# Patient Record
Sex: FEMALE | Race: BLACK OR AFRICAN AMERICAN | Employment: FULL TIME | URBAN - METROPOLITAN AREA
[De-identification: names, ages, dates, MRNs, and addresses within clinical notes are randomized per-mention and may not be internally consistent; named-entity substitution may affect disease eponyms.]

---

## 2017-01-13 ENCOUNTER — CLINICAL SUPPORT (OUTPATIENT)
Dept: SURGERY | Age: 52
End: 2017-01-13

## 2017-01-13 ENCOUNTER — HOSPITAL ENCOUNTER (OUTPATIENT)
Dept: LAB | Age: 52
Discharge: HOME | End: 2017-01-13
Payer: COMMERCIAL

## 2017-01-13 VITALS
HEART RATE: 72 BPM | RESPIRATION RATE: 18 BRPM | BODY MASS INDEX: 44.55 KG/M2 | SYSTOLIC BLOOD PRESSURE: 124 MMHG | TEMPERATURE: 98.3 F | HEIGHT: 59 IN | WEIGHT: 221 LBS | DIASTOLIC BLOOD PRESSURE: 70 MMHG

## 2017-01-13 DIAGNOSIS — Z12.11 ENCOUNTER FOR SCREENING COLONOSCOPY: Primary | ICD-10-CM

## 2017-01-13 DIAGNOSIS — E03.9 HYPOTHYROIDISM, UNSPECIFIED TYPE: ICD-10-CM

## 2017-01-13 DIAGNOSIS — E78.1 HYPERTRIGLYCERIDEMIA: ICD-10-CM

## 2017-01-13 DIAGNOSIS — Z01.419 WELL WOMAN EXAM WITH ROUTINE GYNECOLOGICAL EXAM: ICD-10-CM

## 2017-01-13 LAB
ALBUMIN SERPL BCP-MCNC: 3.2 G/DL (ref 3.4–5)
ALBUMIN/GLOB SERPL: 0.7 {RATIO} (ref 0.8–1.7)
ALP SERPL-CCNC: 91 U/L (ref 45–117)
ALT SERPL-CCNC: 24 U/L (ref 13–56)
ANION GAP BLD CALC-SCNC: 10 MMOL/L (ref 3–18)
AST SERPL W P-5'-P-CCNC: 9 U/L (ref 15–37)
BILIRUB SERPL-MCNC: 0.5 MG/DL (ref 0.2–1)
BUN SERPL-MCNC: 9 MG/DL (ref 7–18)
BUN/CREAT SERPL: 12 (ref 12–20)
CALCIUM SERPL-MCNC: 8.5 MG/DL (ref 8.5–10.1)
CHLORIDE SERPL-SCNC: 101 MMOL/L (ref 100–108)
CHOLEST SERPL-MCNC: 201 MG/DL
CO2 SERPL-SCNC: 29 MMOL/L (ref 21–32)
CREAT SERPL-MCNC: 0.75 MG/DL (ref 0.6–1.3)
GLOBULIN SER CALC-MCNC: 4.5 G/DL (ref 2–4)
GLUCOSE SERPL-MCNC: 94 MG/DL (ref 74–99)
HDLC SERPL-MCNC: 37 MG/DL (ref 40–60)
HDLC SERPL: 5.4 {RATIO} (ref 0–5)
LDLC SERPL CALC-MCNC: 143.2 MG/DL (ref 0–100)
LIPID PROFILE,FLP: ABNORMAL
POTASSIUM SERPL-SCNC: 4.1 MMOL/L (ref 3.5–5.5)
PROT SERPL-MCNC: 7.7 G/DL (ref 6.4–8.2)
SODIUM SERPL-SCNC: 140 MMOL/L (ref 136–145)
TRIGL SERPL-MCNC: 104 MG/DL (ref ?–150)
TSH SERPL DL<=0.05 MIU/L-ACNC: 1.81 UIU/ML (ref 0.36–3.74)
VLDLC SERPL CALC-MCNC: 20.8 MG/DL

## 2017-01-13 PROCEDURE — 84443 ASSAY THYROID STIM HORMONE: CPT | Performed by: PHYSICIAN ASSISTANT

## 2017-01-13 PROCEDURE — 80061 LIPID PANEL: CPT | Performed by: PHYSICIAN ASSISTANT

## 2017-01-13 PROCEDURE — 36415 COLL VENOUS BLD VENIPUNCTURE: CPT | Performed by: PHYSICIAN ASSISTANT

## 2017-01-13 PROCEDURE — 80053 COMPREHEN METABOLIC PANEL: CPT | Performed by: PHYSICIAN ASSISTANT

## 2017-01-13 NOTE — PROGRESS NOTES
Review of Systems   Constitutional: Negative. HENT: Negative. Eyes: Positive for blurred vision. Negative for double vision, photophobia, pain, discharge and redness. Respiratory: Positive for wheezing. Negative for cough, hemoptysis, sputum production and shortness of breath. Cardiovascular: Negative. Gastrointestinal: Negative. Genitourinary: Negative. Musculoskeletal: Positive for back pain. Negative for falls, joint pain, myalgias and neck pain. Skin: Negative. Neurological: Negative. Endo/Heme/Allergies: Negative. Psychiatric/Behavioral: Negative. Colon Screen    Patient: Shashi Bowers MRN: [id number]  SSN: [id number]    YOB: 1965  Age: 46 y.o. Sex: female        Subjective:   Meg Guillory was referred by her  PCP is Eve Adair. Patient referred for colonoscopy for   Screening colonoscopy. Patient denies rectal pain or bleeding. Abdominal surgeries as described below, specifically none  Family history as described below, specifically none. No Known Allergies  Pt never had cn. Past Medical History   Diagnosis Date    Acute shoulder pain due to trauma 5/17/2016    Allergic rhinitis 11/9/2009    Asthma 2015     PFT confirmed    CAD (coronary artery disease)      hx mi    Carpal tunnel syndrome 11/9/2009    Chronic low back pain without sciatica 5/17/2016    Excessive use of nonsteroidal anti-inflammatory drug (NSAID) 5/17/2016    Fibroid, uterine 11/9/2009    GERD (gastroesophageal reflux disease) 11/9/2009    HLD (hyperlipidemia) 11/9/2009    Hypertension     Hypertriglyceridemia 5/17/2016    Hypothyroidism 5/17/2016    Knee pain, right     SOB (shortness of breath)     Weight gain 5/17/2016     Past Surgical History   Procedure Laterality Date    Hx myomectomy       open first, then embolization 2nd time.     Hx tonsillectomy      Hx breast biopsy       benign, left    Hx gyn       fibroids removed      Family History   Problem Relation Age of Onset    Stroke Father      mini strokes, not sure of number    Hypertension Father     Cancer Maternal Grandfather      not sure type     Social History   Substance Use Topics    Smoking status: Never Smoker    Smokeless tobacco: Never Used    Alcohol use No      Prior to Admission medications    Medication Sig Start Date End Date Taking? Authorizing Provider   desloratadine (CLARINEX) 5 mg tablet Take 1 Tab by mouth daily. 10/24/16  Yes NIMA Guadarrama   lisinopril-hydrochlorothiazide (PRINZIDE, ZESTORETIC) 10-12.5 mg per tablet Take 1 Tab by mouth daily. 10/24/16  Yes NIMA Guadarrama   OTHER Indications: fish oil   Yes Barrington Wooten, MD   mometasone (NASONEX) 50 mcg/actuation nasal spray 2 Sprays by Both Nostrils route daily. 4/15/16  Yes Katty Carranza PA-C   fluticasone-salmeterol (ADVAIR) 100-50 mcg/dose diskus inhaler Take 1 Puff by inhalation every twelve (12) hours. 12/8/15  Yes Frankie Kemp NP   celecoxib (CELEBREX) 200 mg capsule Take 1 capsule twice a day with food. 4/21/15  Yes Frankie Kemp NP   aspirin 81 mg chewable tablet Take 1 Tab by mouth daily. 4/21/15  Yes Frankie Kemp NP   albuterol (PROVENTIL HFA, VENTOLIN HFA, PROAIR HFA) 90 mcg/actuation inhaler Take 2 Puffs by inhalation every four (4) hours as needed for Wheezing. 4/21/15  Yes Frankie Kemp NP   predniSONE (DELTASONE) 20 mg tablet Take 1 Tab by mouth daily (with breakfast). 12/12/16   NIMA Guadarrama   ibuprofen (MOTRIN) 600 mg tablet Take 1 Tab by mouth every six (6) hours as needed for Pain. 5/20/16   Gladystdanyel Cabral MD   omega-3 acid ethyl esters (LOVAZA) 1 gram capsule Take 2 Caps by mouth two (2) times daily (with meals). Indications: HYPERTRIGLYCERIDEMIA 5/17/16   Frankie Kemp NP   levothyroxine (SYNTHROID) 25 mcg tablet Take 1 Tab by mouth Daily (before breakfast).  Indications: HYPOTHYROIDISM 5/17/16   Frankie Kemp NP          Review of Systems:        Risks colonoscopy described- colon injury, missed lesion, anesthesia problems, bleeding       Shelley Moctezuma RN  January 13, 2017  11:43 AM

## 2017-01-13 NOTE — MR AVS SNAPSHOT
Visit Information Date & Time Provider Department Dept. Phone Encounter #  
 1/13/2017 11:00 AM [facility] NURSE VISIT [place] [phone] [id number] Your Appointments 1/16/2017  4:00 PM  
FOLLOW UP EXAM with NIMA Ann ([facility]) Appt Note: 8 wk f/u  
 [address] [place] [address]  
[facility] [address] Upcoming Health Maintenance Date Due COLONOSCOPY 4/2/1983 Pneumococcal 19-64 Medium Risk (1 of 1 - PPSV23) 4/2/1984 DTaP/Tdap/Td series (1 - Tdap) 4/2/1986 BREAST CANCER SCRN MAMMOGRAM 12/11/2016 PAP AKA CERVICAL CYTOLOGY 11/21/2019 Allergies as of 1/13/2017  Review Complete On: 1/13/2017 By: Elyssa Pond RN No Known Allergies Current Immunizations  Never Reviewed No immunizations on file. Not reviewed this visit Vitals BP Pulse Temp Resp Height(growth percentile) Weight(growth percentile) 124/70 72 98.3 °F (36.8 °C) 18 4' 11\" (1.499 m) 221 lb (100.2 kg) LMP BMI OB Status Smoking Status 03/31/2015 44.64 kg/m2 Postmenopausal Never Smoker BMI and BSA Data Body Mass Index Body Surface Area  
 44.64 kg/m 2 2.04 m 2 Preferred Pharmacy Pharmacy Name Phone [id number] - [address] [phone] Your Updated Medication List  
  
   
This list is accurate as of: 1/13/17 11:46 AM.  Always use your most recent med list.  
  
  
  
  
 albuterol 90 mcg/actuation inhaler Commonly known as:  PROVENTIL HFA, VENTOLIN HFA, PROAIR HFA Take 2 Puffs by inhalation every four (4) hours as needed for Wheezing. aspirin 81 mg chewable tablet Take 1 Tab by mouth daily. celecoxib 200 mg capsule Commonly known as:  CELEBREX Take 1 capsule twice a day with food. desloratadine 5 mg tablet Commonly known as:  CLARINEX Take 1 Tab by mouth daily. fluticasone-salmeterol 100-50 mcg/dose diskus inhaler Commonly known as:  ADVAIR Take 1 Puff by inhalation every twelve (12) hours. ibuprofen 600 mg tablet Commonly known as:  MOTRIN Take 1 Tab by mouth every six (6) hours as needed for Pain.  
  
 levothyroxine 25 mcg tablet Commonly known as:  SYNTHROID Take 1 Tab by mouth Daily (before breakfast). Indications: HYPOTHYROIDISM  
  
 lisinopril-hydroCHLOROthiazide 10-12.5 mg per tablet Commonly known as:  Mehran Boxer Take 1 Tab by mouth daily. mometasone 50 mcg/actuation nasal spray Commonly known as:  NASONEX  
2 Sprays by Both Nostrils route daily. omega-3 acid ethyl esters 1 gram capsule Commonly known as:  Leanor Cedric Take 2 Caps by mouth two (2) times daily (with meals). Indications: HYPERTRIGLYCERIDEMIA  
  
 OTHER Indications: fish oil  
  
 predniSONE 20 mg tablet Commonly known as:  Abete Corner Take 1 Tab by mouth daily (with breakfast). To-Do List   
 01/18/2017 4:30 PM  
  Appointment with CHUYITA GERMAIN  1 at [address] [place] [address] ([phone]) OUTSIDE FILMS  - Any outside films related to the study being scheduled should be brought with you on the day of the exam.  If this cannot be done there may be a delay in the reading of the study. MEDICATIONS  - Patient must bring a complete list of all medications currently taking to include prescriptions, over-the-counter meds, herbals, vitamins & any dietary supplements  GENERAL INSTRUCTIONS  - On the day of your exam do not use any bath powder, deodorant or lotions on the armpit area. -Tenderness of breasts may cause an increase of discomfort during procedure. If you are experiencing breast tenderness on the day of your appointment and would like to reschedule, please call [phone]. Patient Instructions MIRALAX PREP FOR PROCEDURE  
 
 Your Procedure is scheduled on        plan to arrive at [facility] 2nd Floor @               am/pm. Please be advised that your arrival time is subject to change. You will receive a confirmation call the day before your procedure to confirm your EXACT arrival time .~ Please do not arrive any earlier than time given as this may prolong your wait time for your procedure ~  
**Effective immediately, all patients will need to have a ride to and from [facility], and their ride must stay while they are in the procedure** YOU MUST STOP ALL BLOOD THINNING MEDICATIONS according to the doctor that prescribed them for you. Examples: Aspirin, Coumadin, Plavix, Xarelto, Pradaxa, Effient and Eliquis. See list for how many days in advance you must stop these medications. You must also stop any NSAIDS such as, Ibuprofen, Mobic, Advil, Aleve and Naprosyn for 5 days. You must also stop any platelet aggregation inhibitors such as Aggrenox. If you are on any WILKS-2 inhibitors such as Celebrex you must stop for 3 days. Purchase 1 bottle of MIRALAX (238 gram bottle), generic OK Purchase DULCOLAX Laxative 5 mg tablets, NOT stool softener. Purchase two 32 oz. bottles of GATORADE. (No red or purple) Diabetics use 64 oz. of water, ok to flavor with Crystal Light. You also may 
     choose to purchase two 32 oz bottles of Powerade Zero (No red or purple) 5 days before your bowel prep eat a low fiber diet (minimize fruits, vegetables and whole grains) and stop any fiber supplements. The Day before your Procedure:  Beginning at breakfast start a clear liquid diet. This consists of clear broth, coffee or tea without milk, apple and white-grape juice. You may also have Jell-O, Popsicle and clear soft drinks. NO MILK PRODUCTS AND NOTHING RED IN COLOR. 1.  At 1 pm take four 5 mg Dulcolax tablets. 2.  At 4 pm mix half of the Lorenzo Boer into 32 oz of your selected fluid (Gatorade, water/Crystal Light or Powerade Zero). Drink 8 oz every 15-20 minutes until finished. 3. At 8 pm mix the second half of MIRALAX into the other 32 oz of your selected fluid (Gatorade, water/Crystal Light or Powerade Zero). Drink 8 oz every 15-20 minutes until finished. 4.  If severe nausea occurs while drinking the prep, rest for 20 minutes then restart. HAVE NOTHING BY MOUTH AFTER MIDNIGHT The Day of Your Procedure: YOU MAY SHOWER AND BRUSH YOUR TEETH. YOU MAY NOT HAVE HARD CANDY OR CHEWING GUM. DO NOT TAKE ORAL DIABETIC, INSULIN, & DIURECTIC (FLUID) MEDS THE MORNING OF YOUR PROCEDURE. YOU MAY TAKE ALL OTHER MEDS WITH A SMALL SIP OF WATER. If you have been instructed to stop a blood thinner such as Aspirin, Plavix or Coumadin, etc. and have not been approved by your primary care doctor to do so, please contact our office. Permission to stop aspirin 7 days prior to procedure. Contact Hmou-[phone]   or Ed Valles [phone] Introducing [facility]! Ofe Micah introduces [employer] patient portal. Now you can access parts of your medical record, email your doctor's office, and request medication refills online. 1. In your internet browser, go to [email or web address] 2. Click on the First Time User? Click Here link in the Sign In box. You will see the New Member Sign Up page. 3. Enter your [employer] Access Code exactly as it appears below. You will not need to use this code after youve completed the sign-up process. If you do not sign up before the expiration date, you must request a new code. · [employer] Access Code: [email or web address] Expires: 1/22/2017  2:52 PM 
 
4. Enter the last four digits of your Social Security Number (xxxx) and Date of Birth (mm/dd/yyyy) as indicated and click Submit. You will be taken to the next sign-up page. 5. Create a Look[employer] ID. This will be your [employer] login ID and cannot be changed, so think of one that is secure and easy to remember. 6. Create a [employer] password. You can change your password at any time. 7. Enter your Password Reset Question and Answer. This can be used at a later time if you forget your password. 8. Enter your e-mail address. You will receive e-mail notification when new information is available in [address] 9. Click Sign Up. You can now view and download portions of your medical record. 10. Click the Download Summary menu link to download a portable copy of your medical information. If you have questions, please visit the Frequently Asked Questions section of the [employer] website. Remember, [employer] is NOT to be used for urgent needs. For medical emergencies, dial 911. Now available from your iPhone and Android! Please provide this summary of care documentation to your next provider. Your primary care clinician is listed as Elyssa Olivares. If you have any questions after today's visit, please call [phone].

## 2017-01-13 NOTE — PATIENT INSTRUCTIONS
MIRALAX PREP FOR PROCEDURE     Your Procedure is scheduled on        plan to arrive at [facility] 2nd Floor @               am/pm. Please be advised that your arrival time is subject to change. You will receive a confirmation call the day before your procedure to confirm your EXACT arrival time  .~ Please do not arrive any earlier than time given as this may prolong your wait time for your procedure ~   **Effective immediately, all patients will need to have a ride to and from [facility], and their ride must stay while they are in the procedure**    YOU MUST STOP ALL BLOOD THINNING MEDICATIONS according to the doctor that prescribed them for you. Examples: Aspirin, Coumadin, Plavix, Xarelto, Pradaxa, Effient and Eliquis. See list for how many days in advance you must stop these medications. You must also stop any NSAIDS such as, Ibuprofen, Mobic, Advil, Aleve and Naprosyn for 5 days. You must also stop any platelet aggregation inhibitors such as Aggrenox. If you are on any WILKS-2 inhibitors such as Celebrex you must stop for 3 days. Purchase 1 bottle of MIRALAX (238 gram bottle), generic OK               Purchase DULCOLAX Laxative 5 mg tablets, NOT stool softener. Purchase two 32 oz. bottles of GATORADE. (No red or purple)       Diabetics use 64 oz. of water, ok to flavor with Crystal Light. You also may       choose to purchase two 32 oz bottles of Powerade Zero (No red or purple)            5 days before your bowel prep eat a low fiber diet (minimize fruits, vegetables and whole grains) and stop any fiber supplements. The Day before your Procedure:      Beginning at breakfast start a clear liquid diet. This consists of clear broth, coffee or tea without milk, apple and white-grape juice. You may also have Jell-O, Popsicle and clear soft drinks. NO MILK PRODUCTS AND NOTHING RED IN COLOR. 1.  At 1 pm take four 5 mg Dulcolax tablets.     2.  At 4 pm mix half of the Henrey Brooklyn into 32 oz of your selected fluid (Gatorade, water/Crystal Light or Powerade Zero). Drink 8 oz every 15-20 minutes until finished. 3. At 8 pm mix the second half of MIRALAX into the other 32 oz of your selected fluid (Gatorade, water/Crystal Light or Powerade Zero). Drink 8 oz every 15-20 minutes until finished. 4.  If severe nausea occurs while drinking the prep, rest for 20 minutes then restart. HAVE NOTHING BY MOUTH AFTER MIDNIGHT                 The Day of Your Procedure:      YOU MAY SHOWER AND BRUSH YOUR TEETH. YOU MAY NOT HAVE HARD CANDY OR CHEWING GUM. DO NOT TAKE ORAL DIABETIC, INSULIN, & DIURECTIC (FLUID) MEDS THE MORNING OF YOUR PROCEDURE. YOU MAY TAKE ALL OTHER MEDS WITH A SMALL SIP OF WATER. If you have been instructed to stop a blood thinner such as Aspirin, Plavix or Coumadin, etc. and have not been approved by your primary care doctor to do so, please contact our office. Permission to stop aspirin 7 days prior to procedure.                   Contact Iygy-[phone] -  or Gabriel Montes [phone]

## 2017-01-18 ENCOUNTER — HOSPITAL ENCOUNTER (OUTPATIENT)
Dept: MAMMOGRAPHY | Age: 52
Discharge: HOME | End: 2017-01-18
Attending: PHYSICIAN ASSISTANT
Payer: COMMERCIAL

## 2017-01-18 DIAGNOSIS — Z01.419 WELL WOMAN EXAM WITH ROUTINE GYNECOLOGICAL EXAM: ICD-10-CM

## 2017-01-18 DIAGNOSIS — Z12.39 SCREENING FOR BREAST CANCER: ICD-10-CM

## 2017-01-18 PROCEDURE — 77067 SCR MAMMO BI INCL CAD: CPT

## 2017-01-23 ENCOUNTER — OFFICE VISIT (OUTPATIENT)
Dept: FAMILY MEDICINE CLINIC | Age: 52
End: 2017-01-23

## 2017-01-23 VITALS
RESPIRATION RATE: 18 BRPM | OXYGEN SATURATION: 99 % | BODY MASS INDEX: 45.16 KG/M2 | HEIGHT: 59 IN | SYSTOLIC BLOOD PRESSURE: 150 MMHG | HEART RATE: 78 BPM | DIASTOLIC BLOOD PRESSURE: 83 MMHG | TEMPERATURE: 98.6 F | WEIGHT: 224 LBS

## 2017-01-23 DIAGNOSIS — I10 ESSENTIAL HYPERTENSION: Primary | ICD-10-CM

## 2017-01-23 DIAGNOSIS — E78.5 HYPERLIPIDEMIA, UNSPECIFIED HYPERLIPIDEMIA TYPE: ICD-10-CM

## 2017-01-23 DIAGNOSIS — J45.30 MILD PERSISTENT ASTHMA WITHOUT COMPLICATION: ICD-10-CM

## 2017-01-23 RX ORDER — ROSUVASTATIN CALCIUM 5 MG/1
TABLET, COATED ORAL
COMMUNITY
End: 2018-08-16

## 2017-01-23 NOTE — PROGRESS NOTES
Patient: Johnathan Velázquez MRN: [id number]  SSN: [id number]    YOB: 1965  Age: 46 y.o. Sex: female      Date of Service: 1/23/2017   Provider: NIMA Julian         REASON FOR VISIT:   Chief Complaint   Patient presents with    Follow-up     pt states she need to get results from [facility] and lab work        VITALS:   Visit Vitals    /83 (BP 1 Location: Right arm, BP Patient Position: Sitting)    Pulse 78    Temp 98.6 °F (37 °C) (Oral)    Resp 18    Ht 4' 11\" (1.499 m)    Wt 224 lb (101.6 kg)    LMP 03/31/2015    SpO2 99%    BMI 45.24 kg/m2       MEDICATIONS:   Current Outpatient Prescriptions on File Prior to Visit   Medication Sig Dispense Refill    predniSONE (DELTASONE) 20 mg tablet Take 1 Tab by mouth daily (with breakfast). 5 Tab 0    desloratadine (CLARINEX) 5 mg tablet Take 1 Tab by mouth daily. 90 Tab 3    lisinopril-hydrochlorothiazide (PRINZIDE, ZESTORETIC) 10-12.5 mg per tablet Take 1 Tab by mouth daily. 30 Tab 2    ibuprofen (MOTRIN) 600 mg tablet Take 1 Tab by mouth every six (6) hours as needed for Pain. 20 Tab 0    OTHER Indications: fish oil      omega-3 acid ethyl esters (LOVAZA) 1 gram capsule Take 2 Caps by mouth two (2) times daily (with meals). Indications: HYPERTRIGLYCERIDEMIA 360 Cap 3    mometasone (NASONEX) 50 mcg/actuation nasal spray 2 Sprays by Both Nostrils route daily. 1 Container 0    fluticasone-salmeterol (ADVAIR) 100-50 mcg/dose diskus inhaler Take 1 Puff by inhalation every twelve (12) hours. 3 Inhaler 3    aspirin 81 mg chewable tablet Take 1 Tab by mouth daily. 30 Tab 11    albuterol (PROVENTIL HFA, VENTOLIN HFA, PROAIR HFA) 90 mcg/actuation inhaler Take 2 Puffs by inhalation every four (4) hours as needed for Wheezing. 3 Inhaler 3    celecoxib (CELEBREX) 200 mg capsule Take 1 capsule twice a day with food. 60 Cap 3     No current facility-administered medications on file prior to visit.          ALLERGIES:   No Known Allergies ACTIVE MEDICAL PROBLEMS:  Patient Active Problem List   Diagnosis Code    Allergic rhinitis J30.9    [facility] 40.0[facility]44.9[facility] Z68.41    Hypertension I10    Elevated TSH R94.6    Asthma J45.909    Chronic low back pain without sciatica M54.5, G89.29    Excessive use of nonsteroidal anti-inflammatory drug (NSAID) F19.90    Hypertriglyceridemia E78.1    Weight gain R63.5    Hypothyroidism E03.9    Compliance with medication regimen Z91.89        MEDICAL/SURGICAL HISTORY:  Past Medical History   Diagnosis Date    Acute shoulder pain due to trauma 5/17/2016    Allergic rhinitis 11/9/2009    Asthma 2015     PFT confirmed    CAD (coronary artery disease)      hx mi    Carpal tunnel syndrome 11/9/2009    Chronic low back pain without sciatica 5/17/2016    Excessive use of nonsteroidal anti-inflammatory drug (NSAID) 5/17/2016    Fibroid, uterine 11/9/2009    GERD (gastroesophageal reflux disease) 11/9/2009    HLD (hyperlipidemia) 11/9/2009    Hypertension     Hypertriglyceridemia 5/17/2016    Hypothyroidism 5/17/2016    Knee pain, right     SOB (shortness of breath)     Weight gain 5/17/2016      Past Surgical History   Procedure Laterality Date    Hx myomectomy       open first, then embolization 2nd time.  Hx tonsillectomy      Hx breast biopsy       benign, left    Hx gyn       fibroids removed        FAMILY HISTORY:  Family History   Problem Relation Age of Onset    Stroke Father      mini strokes, not sure of number    Hypertension Father     Cancer Maternal Grandfather      not sure type        SOCIAL HISTORY:  Social History   Substance Use Topics    Smoking status: Never Smoker    Smokeless tobacco: Never Used    Alcohol use No             HISTORY OF PRESENT ILLNESS: Armin Zheng is a 46 y.o. female who presents to the office for a routine follow up visit.      Hypertension -  HTN had been previously very well controlled, though BP noted to be a bit elevated today. Patient admits she has forgotten to take her medications for the past 2 days, as things have been very busy at work. She stopped checking her blood pressure at home, as readings had been stable for several months. BP Readings from Last 3 Encounters:   01/23/17 150/83   01/13/17 124/70   12/12/16 138/68        Hypothyroidism -   Patient stopped synthroid several months ago  Recent TSH was within normal limits  Continues to complain of difficulty losing weight, but suspects this may be due to lack of exercise     Lab Results   Component Value Date/Time    TSH 1.81 01/13/2017 10:09 AM        Hyperlipidemia/CAD -   Recent lipid panel shows decreased HDL, increased LDL, however triglycerides have improved substantially with diet. Patient has been off statins for several months now, as she was concerned about potential side effects. Lab Results   Component Value Date/Time    Cholesterol, total 201 01/13/2017 10:09 AM    HDL Cholesterol 37 01/13/2017 10:09 AM    LDL, calculated 143.2 01/13/2017 10:09 AM    VLDL, calculated 20.8 01/13/2017 10:09 AM    Triglyceride 104 01/13/2017 10:09 AM    CHOL/HDL Ratio 5.4 01/13/2017 10:09 AM        Asthma -   Patient reports symptoms are stable. Compliant with Advair  Uses albuterol twice daily, once in the morning and once in the evening. Didn't realize this was supposed to be a strictly as needed medication. No nighttime awakenings with symptoms     REVIEW OF SYSTEMS:  Review of Systems   Constitutional: Negative for chills and fever. Eyes: Negative for blurred vision and double vision. Respiratory: Negative for shortness of breath and wheezing. Cardiovascular: Negative for chest pain, palpitations and leg swelling. Gastrointestinal: Negative for abdominal pain, nausea and vomiting. Neurological: Negative for dizziness and headaches. PHYSICAL EXAMINATION:  Physical Exam   Constitutional: She is well-developed, well-nourished, and in no distress. Cardiovascular: Normal rate, regular rhythm and normal heart sounds. Exam reveals no gallop and no friction rub. No murmur heard. Pulmonary/Chest: Effort normal and breath sounds normal. She has no wheezes. She has no rales. Skin: Skin is warm and dry. RESULTS:  No results found for this visit on 01/23/17. ASSESSMENT/PLAN:  Leonardo Bumpers was seen today for follow-up. Diagnoses and all orders for this visit:    Essential hypertension  - BP a bit elevated today, but suspect this is likely secondary to patient forgetting her medications over the past two days  - No change in medication dosage today  - Reviewed strategies to improve med compliance  - Advised resuming home monitoring, call if persistently > 208 systolic or > 90 diastolic    Hyperlipidemia, unspecified hyperlipidemia type  - Mild, but given history of CAD will resume statin therapy with Crestor 5 mg qhs   - Repeat lipid panel in 3 months     Mild persistent asthma without complication  - Stable, continue current regimen for now  - Did review proper use of albuterol inhaler, and if patient finds that she is needing this due to symptoms of cough/wheezing on a daily basis, should consider increasing dose of advair       Follow up 3 months  Sooner as needed    Patient expresses understanding and is agreeable with the above plan.         Vallerie Schilder, PA   January 23, 2017    6:05 PM

## 2017-01-23 NOTE — MR AVS SNAPSHOT
Visit Information Date & Time Provider Department Dept. Phone Encounter #  
 1/23/2017  5:00 PM Bronson Stratton [phone] [id number] Your Appointments 4/24/2017  4:30 PM  
FOLLOW UP EXAM with NIMA Stratton ([facility]) Appt Note: 3 month f/u  
 [address] [facility] [address]  
[facility] [address] Upcoming Health Maintenance Date Due COLONOSCOPY 4/2/1983 Pneumococcal 19-64 Medium Risk (1 of 1 - PPSV23) 4/2/1984 DTaP/Tdap/Td series (1 - Tdap) 4/2/1986 BREAST CANCER SCRN MAMMOGRAM 1/18/2018 PAP AKA CERVICAL CYTOLOGY 1/13/2020 Allergies as of 1/23/2017  Review Complete On: 1/23/2017 By: Analia Anguiano LPN No Known Allergies Current Immunizations  Never Reviewed No immunizations on file. Not reviewed this visit You Were Diagnosed With   
  
 Codes Comments Essential hypertension    -  Primary ICD-10-CM: I10 
ICD-9-CM: 401.9 Hyperlipidemia, unspecified hyperlipidemia type     ICD-10-CM: E78.5 ICD-9-CM: 272.4 Vitals BP Pulse Temp Resp Height(growth percentile) Weight(growth percentile) 150/83 (BP 1 Location: Right arm, BP Patient Position: Sitting) 78 98.6 °F (37 °C) (Oral) 18 4' 11\" (1.499 m) 224 lb (101.6 kg) LMP SpO2 BMI OB Status Smoking Status 03/31/2015 99% 45.24 kg/m2 Postmenopausal Never Smoker Vitals History BMI and BSA Data Body Mass Index Body Surface Area  
 45.24 kg/m 2 2.06 m 2 Preferred Pharmacy Pharmacy Name Phone [phone] - [phone] Your Updated Medication List  
  
   
This list is accurate as of: 1/23/17  5:43 PM.  Always use your most recent med list.  
  
  
  
  
 albuterol 90 mcg/actuation inhaler Commonly known as:  PROVENTIL HFA, VENTOLIN HFA, PROAIR HFA Take 2 Puffs by inhalation every four (4) hours as needed for Wheezing. aspirin 81 mg chewable tablet Take 1 Tab by mouth daily. celecoxib 200 mg capsule Commonly known as:  CELEBREX Take 1 capsule twice a day with food. CRESTOR 5 mg tablet Generic drug:  rosuvastatin Take  by mouth nightly. desloratadine 5 mg tablet Commonly known as:  CLARINEX Take 1 Tab by mouth daily. fluticasone-salmeterol 100-50 mcg/dose diskus inhaler Commonly known as:  ADVAIR Take 1 Puff by inhalation every twelve (12) hours. lisinopril-hydroCHLOROthiazide 10-12.5 mg per tablet Commonly known as:  Spencer Wolf Take 1 Tab by mouth daily. mometasone 50 mcg/actuation nasal spray Commonly known as:  NASONEX  
2 Sprays by Both Nostrils route daily. omega-3 acid ethyl esters 1 gram capsule Commonly known as:  Anthonette Rolling Take 2 Caps by mouth two (2) times daily (with meals). Indications: HYPERTRIGLYCERIDEMIA  
  
 OTHER Indications: fish oil Introducing [facility]! [employer] introduces [employer] patient portal. Now you can access parts of your medical record, email your doctor's office, and request medication refills online. 1. In your internet browser, go to [email or web address] 2. Click on the First Time User? Click Here link in the Sign In box. You will see the New Member Sign Up page. 3. Enter your [employer] Access Code exactly as it appears below. You will not need to use this code after youve completed the sign-up process. If you do not sign up before the expiration date, you must request a new code. · [employer] Access Code: [email or web address] Expires: 4/23/2017  5:43 PM 
 
4. Enter the last four digits of your Social Security Number (xxxx) and Date of Birth (mm/dd/yyyy) as indicated and click Submit. You will be taken to the next sign-up page. 5. Create a Stylyt ID. This will be your Stylyt login ID and cannot be changed, so think of one that is secure and easy to remember. 6. Create a Stylyt password. You can change your password at any time. 7. Enter your Password Reset Question and Answer. This can be used at a later time if you forget your password. 8. Enter your e-mail address. You will receive e-mail notification when new information is available in [address] 9. Click Sign Up. You can now view and download portions of your medical record. 10. Click the Download Summary menu link to download a portable copy of your medical information. If you have questions, please visit the Frequently Asked Questions section of the Stylyt website. Remember, Stylyt is NOT to be used for urgent needs. For medical emergencies, dial 911. Now available from your iPhone and Android! Please provide this summary of care documentation to your next provider. Your primary care clinician is listed as Mayra Sloan. If you have any questions after today's visit, please call [phone].

## 2017-01-23 NOTE — PROGRESS NOTES
Chief Complaint   Patient presents with    Follow-up     pt states she need to get results from [facility] and lab work

## 2017-04-30 VITALS
OXYGEN SATURATION: 100 % | HEART RATE: 80 BPM | TEMPERATURE: 98.6 F | BODY MASS INDEX: 44.43 KG/M2 | DIASTOLIC BLOOD PRESSURE: 77 MMHG | SYSTOLIC BLOOD PRESSURE: 134 MMHG | WEIGHT: 220 LBS | RESPIRATION RATE: 18 BRPM

## 2017-04-30 PROCEDURE — 99282 EMERGENCY DEPT VISIT SF MDM: CPT

## 2017-05-01 ENCOUNTER — HOSPITAL ENCOUNTER (EMERGENCY)
Age: 52
Discharge: HOME | End: 2017-05-01
Attending: EMERGENCY MEDICINE
Payer: COMMERCIAL

## 2017-05-01 DIAGNOSIS — M54.32 SCIATICA OF LEFT SIDE: Primary | ICD-10-CM

## 2017-05-01 RX ORDER — PREDNISONE 50 MG/1
50 TABLET ORAL DAILY
Qty: 5 TAB | Refills: 0 | Status: SHIPPED | OUTPATIENT
Start: 2017-05-01 | End: 2017-05-06

## 2017-05-01 RX ORDER — HYDROCODONE BITARTRATE AND ACETAMINOPHEN 5; 325 MG/1; MG/1
TABLET ORAL
Qty: 6 TAB | Refills: 0 | Status: SHIPPED | OUTPATIENT
Start: 2017-05-01 | End: 2017-09-12 | Stop reason: ALTCHOICE

## 2017-05-01 RX ORDER — NAPROXEN 500 MG/1
500 TABLET ORAL 2 TIMES DAILY WITH MEALS
Qty: 20 TAB | Refills: 0 | Status: SHIPPED | OUTPATIENT
Start: 2017-05-01 | End: 2017-05-11

## 2017-05-01 NOTE — ED PROVIDER NOTES
HPI Comments: 12:37 AM Loretta Story is a 46 y.o. female with a hx of HTN who presents to the ED via POV c/o lower back pain radiating down back of left leg  that began 4/29/17. Pt states pain is worsened with ambulation. Pt says pain is currently most severe in left hamstring area. She has not taken any medication for her pain. The Pt denies trauma, fever, hx of IV drug use, numbness, urinary problems, or any further sxs, complaints or concerns at this time. Patient is a 46 y.o. female presenting with leg pain. The history is provided by the patient. Leg Pain    Associated symptoms include back pain (lower back). Pertinent negatives include no numbness. Past Medical History:   Diagnosis Date    Acute shoulder pain due to trauma 5/17/2016    Allergic rhinitis 11/9/2009    Asthma 2015    PFT confirmed    CAD (coronary artery disease)     hx mi    Carpal tunnel syndrome 11/9/2009    Chronic low back pain without sciatica 5/17/2016    Excessive use of nonsteroidal anti-inflammatory drug (NSAID) 5/17/2016    Fibroid, uterine 11/9/2009    GERD (gastroesophageal reflux disease) 11/9/2009    HLD (hyperlipidemia) 11/9/2009    Hypertension     Hypertriglyceridemia 5/17/2016    Hypothyroidism 5/17/2016    Knee pain, right     SOB (shortness of breath)     Weight gain 5/17/2016       Past Surgical History:   Procedure Laterality Date    HX BREAST BIOPSY      benign, left    HX GYN      fibroids removed    HX MYOMECTOMY      open first, then embolization 2nd time.  HX TONSILLECTOMY           Family History:   Problem Relation Age of Onset    Stroke Father      mini strokes, not sure of number    Hypertension Father     Cancer Maternal Grandfather      not sure type       Social History     Social History    Marital status:      Spouse name: N/A    Number of children: N/A    Years of education: N/A     Occupational History    Not on file.      Social History Main Topics  Smoking status: Never Smoker    Smokeless tobacco: Never Used    Alcohol use No    Drug use: No    Sexual activity: Yes     Partners: Male     Birth control/ protection: None     Other Topics Concern     Service No    Blood Transfusions No    Caffeine Concern No    Occupational Exposure No    Hobby Hazards No    Sleep Concern No    Stress Concern No    Weight Concern Yes    Special Diet No    Back Care Yes    Exercise No    Bike Helmet No    Seat Belt Yes     some times    Self-Exams Yes     Social History Narrative         ALLERGIES: Review of patient's allergies indicates no known allergies. Review of Systems   Constitutional: Negative for chills and fever. Genitourinary: Negative for difficulty urinating and dysuria. Musculoskeletal: Positive for back pain (lower back) and myalgias (left leg ).        +  Posterior upper left leg pain    Neurological: Negative for weakness and numbness. All other systems reviewed and are negative. Vitals:    04/30/17 2346   BP: 134/77   Pulse: 80   Resp: 18   Temp: 98.6 °F (37 °C)   SpO2: 100%   Weight: 99.8 kg (220 lb)            Physical Exam   Constitutional: She is oriented to person, place, and time. She appears well-developed and well-nourished. HENT:   Head: Normocephalic and atraumatic. Neck: Neck supple. No JVD present. Musculoskeletal: She exhibits no edema. +SLR on L  Strength 5/5 BL LE  Gross sensation intact BL LE  No midline spinal tenderness or step off  Moderate L piriformis tenderness   Neurological: She is alert and oriented to person, place, and time. No cranial nerve deficit. Skin: Skin is warm and dry. No erythema.    Psychiatric: Judgment normal.        MDM  Number of Diagnoses or Management Options  Sciatica of left side:   Diagnosis management comments: 45 y/o female presents with leg pain  No hx of trauma, no red flags, no indication for imaging  Exam consistent with sciatica, will treat and refer to ortho.     ED Course       Procedures      Vitals:  Patient Vitals for the past 12 hrs:   Temp Pulse Resp BP SpO2   04/30/17 2346 98.6 °F (37 °C) 80 18 134/77 100 %   100 % Pulse ox reviewed and WNL. Progress notes, Consult notes or additional Procedure notes:  Discussed return precautions. Disposition:  Diagnosis:   1. Sciatica of left side        Disposition: discharge     SCRIBE ATTESTATION STATEMENT  Documented by: Manjinder Plascencia scribing for, and in the presence of, Annalee Harrison DO 05/01/17 12:53 AM     Signed by: Bernice Padgett, 05/01/17 12:53 AM     PROVIDER ATTESTATION STATEMENT  I personally performed the services described in the documentation, reviewed the documentation, as recorded by the scribe in my presence, and it accurately and completely records my words and actions.   Annalee Harrison DO

## 2017-05-01 NOTE — ED NOTES
I have reviewed discharge instructions with the patient. The patient verbalized understanding. Pt ambulated without difficulty   Pt awake and alert, color pink, resp even and relaxed. Prescriptions given to pt and understanding for prescriptions verbalized.

## 2017-05-01 NOTE — LETTER
NOTIFICATION RETURN TO WORK / SCHOOL 
 
5/1/2017 1:20 AM 
 
Ms. Meg Rodriguesnight [address] [facility] [address] To Whom It May Concern: 
 
Natalie Gutierrez is currently under the care of [id number] [place] [address] EMERGENCY DEPT. She will return to work/school on: 05/03/17 If there are questions or concerns please have the patient contact our office.  
 
 
 
Sincerely, 
 
 
Harsh Bruner Rn

## 2017-05-01 NOTE — DISCHARGE INSTRUCTIONS
Sciatica: Care Instructions  Your Care Instructions    Sciatica (say \"ymf-NN-sk-kuh\") is an irritation of one of the sciatic nerves, which come from the spinal cord in the lower back. The sciatic nerves and their branches extend down through the buttock to the foot. Sciatica can develop when an injured disc in the back presses against a spinal nerve root. Its main symptom is pain, numbness, or weakness that is often worse in the leg or foot than in the back. Sciatica often will improve and go away with time. Early treatment usually includes medicines and exercises to relieve pain. Follow-up care is a key part of your treatment and safety. Be sure to make and go to all appointments, and call your doctor if you are having problems. It's also a good idea to know your test results and keep a list of the medicines you take. How can you care for yourself at home? · Take pain medicines exactly as directed. ¨ If the doctor gave you a prescription medicine for pain, take it as prescribed. ¨ If you are not taking a prescription pain medicine, ask your doctor if you can take an over-the-counter medicine. · Use heat or ice to relieve pain. ¨ To apply heat, put a warm water bottle, heating pad set on low, or warm cloth on your back. Do not go to sleep with a heating pad on your skin. ¨ To use ice, put ice or a cold pack on the area for 10 to 20 minutes at a time. Put a thin cloth between the ice and your skin. · Avoid sitting if possible, unless it feels better than standing. · Alternate lying down with short walks. Increase your walking distance as you are able to without making your symptoms worse. · Do not do anything that makes your symptoms worse. When should you call for help? Call 911 anytime you think you may need emergency care. For example, call if:  · You are unable to move a leg at all.   Call your doctor now or seek immediate medical care if:  · You have new or worse symptoms in your legs or buttocks. Symptoms may include:  ¨ Numbness or tingling. ¨ Weakness. ¨ Pain. · You lose bladder or bowel control. Watch closely for changes in your health, and be sure to contact your doctor if:  · You are not getting better as expected. Where can you learn more? Go to http://yosef-steph.info/. Enter [phone] in the search box to learn more about \"Sciatica: Care Instructions. \"  Current as of: May 23, 2016  Content Version: 11.2  © 2401-7514 [employer]. Care instructions adapted under license by [employer] (which disclaims liability or warranty for this information). If you have questions about a medical condition or this instruction, always ask your healthcare professional. [address] any warranty or liability for your use of this information.

## 2017-05-31 ENCOUNTER — ANESTHESIA EVENT (OUTPATIENT)
Dept: ENDOSCOPY | Age: 52
End: 2017-05-31
Payer: COMMERCIAL

## 2017-06-01 ENCOUNTER — ANESTHESIA (OUTPATIENT)
Dept: ENDOSCOPY | Age: 52
End: 2017-06-01
Payer: COMMERCIAL

## 2017-06-01 ENCOUNTER — HOSPITAL ENCOUNTER (OUTPATIENT)
Age: 52
Setting detail: OUTPATIENT SURGERY
Discharge: HOME | End: 2017-06-01
Attending: COLON & RECTAL SURGERY | Admitting: COLON & RECTAL SURGERY
Payer: COMMERCIAL

## 2017-06-01 VITALS
SYSTOLIC BLOOD PRESSURE: 108 MMHG | BODY MASS INDEX: 43.75 KG/M2 | OXYGEN SATURATION: 100 % | HEIGHT: 59 IN | DIASTOLIC BLOOD PRESSURE: 62 MMHG | RESPIRATION RATE: 16 BRPM | HEART RATE: 60 BPM | WEIGHT: 217 LBS | TEMPERATURE: 97.4 F

## 2017-06-01 LAB
ATRIAL RATE: 69 BPM
CALCULATED P AXIS, ECG09: 24 DEGREES
CALCULATED R AXIS, ECG10: 10 DEGREES
CALCULATED T AXIS, ECG11: 21 DEGREES
DIAGNOSIS, 93000: NORMAL
HCG UR QL: NEGATIVE
P-R INTERVAL, ECG05: 158 MS
Q-T INTERVAL, ECG07: 406 MS
QRS DURATION, ECG06: 76 MS
QTC CALCULATION (BEZET), ECG08: 435 MS
VENTRICULAR RATE, ECG03: 69 BPM

## 2017-06-01 PROCEDURE — 88305 TISSUE EXAM BY PATHOLOGIST: CPT | Performed by: COLON & RECTAL SURGERY

## 2017-06-01 PROCEDURE — 74011000250 HC RX REV CODE- 250: Performed by: NURSE ANESTHETIST, CERTIFIED REGISTERED

## 2017-06-01 PROCEDURE — 76060000031 HC ANESTHESIA FIRST 0.5 HR: Performed by: COLON & RECTAL SURGERY

## 2017-06-01 PROCEDURE — 93005 ELECTROCARDIOGRAM TRACING: CPT

## 2017-06-01 PROCEDURE — 77030018846 HC SOL IRR STRL H20 ICUM -A: Performed by: COLON & RECTAL SURGERY

## 2017-06-01 PROCEDURE — 77030008565 HC TBNG SUC IRR ERBE -B: Performed by: COLON & RECTAL SURGERY

## 2017-06-01 PROCEDURE — 77030009426 HC FCPS BIOP ENDOSC BSC -B: Performed by: COLON & RECTAL SURGERY

## 2017-06-01 PROCEDURE — 74011250636 HC RX REV CODE- 250/636

## 2017-06-01 PROCEDURE — 74011250636 HC RX REV CODE- 250/636: Performed by: NURSE ANESTHETIST, CERTIFIED REGISTERED

## 2017-06-01 PROCEDURE — 82947 ASSAY GLUCOSE BLOOD QUANT: CPT

## 2017-06-01 PROCEDURE — 74011000250 HC RX REV CODE- 250

## 2017-06-01 PROCEDURE — 81025 URINE PREGNANCY TEST: CPT

## 2017-06-01 PROCEDURE — C1729 CATH, DRAINAGE: HCPCS | Performed by: COLON & RECTAL SURGERY

## 2017-06-01 PROCEDURE — 76040000019: Performed by: COLON & RECTAL SURGERY

## 2017-06-01 RX ORDER — PROPOFOL 10 MG/ML
INJECTION, EMULSION INTRAVENOUS AS NEEDED
Status: DISCONTINUED | OUTPATIENT
Start: 2017-06-01 | End: 2017-06-01 | Stop reason: HOSPADM

## 2017-06-01 RX ORDER — SODIUM CHLORIDE, SODIUM LACTATE, POTASSIUM CHLORIDE, CALCIUM CHLORIDE 600; 310; 30; 20 MG/100ML; MG/100ML; MG/100ML; MG/100ML
75 INJECTION, SOLUTION INTRAVENOUS CONTINUOUS
Status: DISCONTINUED | OUTPATIENT
Start: 2017-06-01 | End: 2017-06-01 | Stop reason: HOSPADM

## 2017-06-01 RX ORDER — NALOXONE HYDROCHLORIDE 0.4 MG/ML
0.04 INJECTION, SOLUTION INTRAMUSCULAR; INTRAVENOUS; SUBCUTANEOUS
Status: DISCONTINUED | OUTPATIENT
Start: 2017-06-01 | End: 2017-06-01 | Stop reason: HOSPADM

## 2017-06-01 RX ORDER — FAMOTIDINE 10 MG/ML
20 INJECTION INTRAVENOUS ONCE
Status: COMPLETED | OUTPATIENT
Start: 2017-06-01 | End: 2017-06-01

## 2017-06-01 RX ORDER — FENTANYL CITRATE 50 UG/ML
50 INJECTION, SOLUTION INTRAMUSCULAR; INTRAVENOUS
Status: DISCONTINUED | OUTPATIENT
Start: 2017-06-01 | End: 2017-06-01 | Stop reason: HOSPADM

## 2017-06-01 RX ORDER — DEXTROSE 50 % IN WATER (D50W) INTRAVENOUS SYRINGE
25-50 AS NEEDED
Status: DISCONTINUED | OUTPATIENT
Start: 2017-06-01 | End: 2017-06-01 | Stop reason: HOSPADM

## 2017-06-01 RX ORDER — SODIUM CHLORIDE 0.9 % (FLUSH) 0.9 %
5-10 SYRINGE (ML) INJECTION AS NEEDED
Status: DISCONTINUED | OUTPATIENT
Start: 2017-06-01 | End: 2017-06-01 | Stop reason: HOSPADM

## 2017-06-01 RX ORDER — SODIUM CHLORIDE, SODIUM LACTATE, POTASSIUM CHLORIDE, CALCIUM CHLORIDE 600; 310; 30; 20 MG/100ML; MG/100ML; MG/100ML; MG/100ML
50 INJECTION, SOLUTION INTRAVENOUS CONTINUOUS
Status: DISCONTINUED | OUTPATIENT
Start: 2017-06-01 | End: 2017-06-01 | Stop reason: HOSPADM

## 2017-06-01 RX ORDER — SODIUM CHLORIDE 0.9 % (FLUSH) 0.9 %
5-10 SYRINGE (ML) INJECTION EVERY 8 HOURS
Status: DISCONTINUED | OUTPATIENT
Start: 2017-06-01 | End: 2017-06-01 | Stop reason: HOSPADM

## 2017-06-01 RX ORDER — LIDOCAINE HYDROCHLORIDE 20 MG/ML
INJECTION, SOLUTION EPIDURAL; INFILTRATION; INTRACAUDAL; PERINEURAL AS NEEDED
Status: DISCONTINUED | OUTPATIENT
Start: 2017-06-01 | End: 2017-06-01 | Stop reason: HOSPADM

## 2017-06-01 RX ORDER — INSULIN LISPRO 100 [IU]/ML
INJECTION, SOLUTION INTRAVENOUS; SUBCUTANEOUS ONCE
Status: DISCONTINUED | OUTPATIENT
Start: 2017-06-01 | End: 2017-06-01 | Stop reason: HOSPADM

## 2017-06-01 RX ORDER — MAGNESIUM SULFATE 100 %
4 CRYSTALS MISCELLANEOUS AS NEEDED
Status: DISCONTINUED | OUTPATIENT
Start: 2017-06-01 | End: 2017-06-01 | Stop reason: HOSPADM

## 2017-06-01 RX ORDER — ALBUTEROL SULFATE 0.83 MG/ML
2.5 SOLUTION RESPIRATORY (INHALATION)
Status: DISCONTINUED | OUTPATIENT
Start: 2017-06-01 | End: 2017-06-01 | Stop reason: HOSPADM

## 2017-06-01 RX ADMIN — LIDOCAINE HYDROCHLORIDE 40 MG: 20 INJECTION, SOLUTION EPIDURAL; INFILTRATION; INTRACAUDAL; PERINEURAL at 10:11

## 2017-06-01 RX ADMIN — SODIUM CHLORIDE, SODIUM LACTATE, POTASSIUM CHLORIDE, AND CALCIUM CHLORIDE 75 ML/HR: 600; 310; 30; 20 INJECTION, SOLUTION INTRAVENOUS at 09:34

## 2017-06-01 RX ADMIN — PROPOFOL 20 MG: 10 INJECTION, EMULSION INTRAVENOUS at 10:17

## 2017-06-01 RX ADMIN — PROPOFOL 40 MG: 10 INJECTION, EMULSION INTRAVENOUS at 10:15

## 2017-06-01 RX ADMIN — PROPOFOL 100 MG: 10 INJECTION, EMULSION INTRAVENOUS at 10:11

## 2017-06-01 RX ADMIN — FAMOTIDINE 20 MG: 10 INJECTION, SOLUTION INTRAVENOUS at 09:36

## 2017-06-01 RX ADMIN — PROPOFOL 40 MG: 10 INJECTION, EMULSION INTRAVENOUS at 10:19

## 2017-06-01 RX ADMIN — PROPOFOL 40 MG: 10 INJECTION, EMULSION INTRAVENOUS at 10:22

## 2017-06-01 RX ADMIN — SODIUM CHLORIDE, SODIUM LACTATE, POTASSIUM CHLORIDE, AND CALCIUM CHLORIDE: 600; 310; 30; 20 INJECTION, SOLUTION INTRAVENOUS at 10:04

## 2017-06-01 RX ADMIN — Medication 10 ML: at 09:37

## 2017-06-01 NOTE — H&P
HPI: Saji Ewing is a 46 y.o. female presenting with chief complain of need for colorectal cancer screening. Past Medical History:   Diagnosis Date    Acute shoulder pain due to trauma 5/17/2016    Allergic rhinitis 11/9/2009    Asthma 2015    PFT confirmed    CAD (coronary artery disease)     hx mi-2013 ?  Carpal tunnel syndrome 11/9/2009    Chronic low back pain without sciatica 5/17/2016    Excessive use of nonsteroidal anti-inflammatory drug (NSAID) 5/17/2016    Fibroid, uterine 11/9/2009    GERD (gastroesophageal reflux disease) 11/9/2009    Heart failure ([address]     heart attack 3 years ago    HLD (hyperlipidemia) 11/9/2009    Hypertension     Hypertriglyceridemia 5/17/2016    Hypothyroidism 5/17/2016    Knee pain, right     SOB (shortness of breath)     Weight gain 5/17/2016       Past Surgical History:   Procedure Laterality Date    HX BREAST BIOPSY      benign, left    HX GYN      fibroids removed    HX MYOMECTOMY      open first, then embolization 2nd time.     HX OTHER SURGICAL      cyst removed    HX TONSILLECTOMY         Family History   Problem Relation Age of Onset    Stroke Father      mini strokes, not sure of number    Hypertension Father     Cancer Maternal Grandfather      not sure type       Social History     Social History    Marital status:      Spouse name: N/A    Number of children: N/A    Years of education: N/A     Social History Main Topics    Smoking status: Never Smoker    Smokeless tobacco: Never Used    Alcohol use No    Drug use: No    Sexual activity: Yes     Partners: Male     Birth control/ protection: None     Other Topics Concern     Service No    Blood Transfusions No    Caffeine Concern No    Occupational Exposure No    Hobby Hazards No    Sleep Concern No    Stress Concern No    Weight Concern Yes    Special Diet No    Back Care Yes    Exercise No    Bike Helmet No    Seat Belt Yes     some times    Self-Exams Yes     Social History Narrative       Review of Systems - negative    Outpatient Prescriptions Marked as Taking for the 6/1/17 encounter [facility] Encounter)   Medication Sig Dispense Refill    rosuvastatin (CRESTOR) 5 mg tablet Take  by mouth nightly.  desloratadine (CLARINEX) 5 mg tablet Take 1 Tab by mouth daily. 90 Tab 3    lisinopril-hydrochlorothiazide (PRINZIDE, ZESTORETIC) 10-12.5 mg per tablet Take 1 Tab by mouth daily. 30 Tab 2    omega-3 acid ethyl esters (LOVAZA) 1 gram capsule Take 2 Caps by mouth two (2) times daily (with meals). Indications: HYPERTRIGLYCERIDEMIA 360 Cap 3    fluticasone-salmeterol (ADVAIR) 100-50 mcg/dose diskus inhaler Take 1 Puff by inhalation every twelve (12) hours. 3 Inhaler 3    aspirin 81 mg chewable tablet Take 1 Tab by mouth daily. 30 Tab 11    albuterol (PROVENTIL HFA, VENTOLIN HFA, PROAIR HFA) 90 mcg/actuation inhaler Take 2 Puffs by inhalation every four (4) hours as needed for Wheezing. 3 Inhaler 3       No Known Allergies    Vitals:    05/23/17 1600 06/01/17 0938   BP:  125/79   Pulse:  75   Resp:  18   Temp:  98.4 °F (36.9 °C)   SpO2:  100%   Weight: 98.4 kg (217 lb)    Height: 4' 11\" (1.499 m)    LMP: 06/01/2017       Physical Exam   Constitutional: She appears well-developed and well-nourished. HENT:   Head: Normocephalic and atraumatic. Eyes: Conjunctivae and EOM are normal.   Abdominal: Soft. She exhibits no distension. There is no tenderness. Musculoskeletal: Normal range of motion. Lymphadenopathy:     She has no cervical adenopathy. Right: No inguinal adenopathy present. Left: No inguinal adenopathy present. Neurological: She exhibits normal muscle tone. Skin: Skin is warm and dry. No rash noted. Psychiatric: She has a normal mood and affect. Her speech is normal.       Assessment / Plan    colonoscopy    The diagnoses and plan were discussed with the patient. All questions answered.   Plan of care agreed to by all concerned.

## 2017-06-01 NOTE — DISCHARGE INSTRUCTIONS
No aspirin or ibuprofen (e.g. Aleve, Motrin, Advil) for 5 days. Repeat colonoscopy in 5 years.         Colonoscopy: What to Expect at [address] [place] [address]  After you have a colonoscopy, you will stay at the clinic for 1 to 2 hours until the medicines wear off. Then you can go home. But you will need to arrange for a ride. Your doctor will tell you when you can eat and do your other usual activities. Your doctor will talk to you about when you will need your next colonoscopy. Your doctor can help you decide how often you need to be checked. This will depend on the results of your test and your risk for colorectal cancer. After the test, you may be bloated or have gas pains. You may need to pass gas. If a biopsy was done or a polyp was removed, you may have streaks of blood in your stool (feces) for a few days. This care sheet gives you a general idea about how long it will take for you to recover. But each person recovers at a different pace. Follow the steps below to get better as quickly as possible. How can you care for yourself at home? Activity  · Rest when you feel tired. · You can do your normal activities when it feels okay to do so. Diet  · Follow your doctor's directions for eating. · Unless your doctor has told you not to, drink plenty of fluids. This helps to replace the fluids that were lost during the colon prep. · Do not drink alcohol. Medicines  · Your doctor will tell you if and when you can restart your medicines. He or she will also give you instructions about taking any new medicines. · If you take blood thinners, such as warfarin (Coumadin), clopidogrel (Plavix), or aspirin, be sure to talk to your doctor. He or she will tell you if and when to start taking those medicines again. Make sure that you understand exactly what your doctor wants you to do.   · If polyps were removed or a biopsy was done during the test, your doctor may tell you not to take aspirin or other anti-inflammatory medicines for a few days. These include ibuprofen (Advil, Motrin) and naproxen (Aleve). Other instructions  · For your safety, do not drive or operate machinery until the medicine wears off and you can think clearly. Your doctor may tell you not to drive or operate machinery until the day after your test.  · Do not sign legal documents or make major decisions until the medicine wears off and you can think clearly. The anesthesia can make it hard for you to fully understand what you are agreeing to. Follow-up care is a key part of your treatment and safety. Be sure to make and go to all appointments, and call your doctor if you are having problems. It's also a good idea to know your test results and keep a list of the medicines you take. When should you call for help? Call 911 anytime you think you may need emergency care. For example, call if:  · You passed out (lost consciousness). · You pass maroon or bloody stools. · You have severe belly pain. Call your doctor now or seek immediate medical care if:  · Your stools are black and tarlike. · Your stools have streaks of blood, but you did not have a biopsy or any polyps removed. · You have belly pain, or your belly is swollen and firm. · You vomit. · You have a fever. · You are very dizzy. Watch closely for changes in your health, and be sure to contact your doctor if you have any problems. Where can you learn more? Go to http://yosef-steph.info/. Enter E264 in the search box to learn more about \"Colonoscopy: What to Expect at Home. \"  Current as of: August 9, 2016  Content Version: 11.2  © 6390-5823 [employer]. Care instructions adapted under license by [employer] (which disclaims liability or warranty for this information).  If you have questions about a medical condition or this instruction, always ask your healthcare professional. [place] [address] any warranty or liability for your use of this information. Colon Polyps: Care Instructions  Your Care Instructions    Colon polyps are growths in the colon or the rectum. The cause of most colon polyps is not known, and most people who get them do not have any problems. But a certain kind can turn into cancer. For this reason, regular testing for colon polyps is important for people age 48 and older and anyone who has an increased risk for colon cancer. Polyps are usually found through routine colon cancer screening tests. Although most colon polyps are not cancerous, they are usually removed and then tested for cancer. Screening for colon cancer saves lives because the cancer can usually be cured if it is caught early. If you have a polyp that is the type that can turn into cancer, you may need more tests to examine your entire colon. The doctor will remove any other polyps that he or she finds, and you will be tested more often. Follow-up care is a key part of your treatment and safety. Be sure to make and go to all appointments, and call your doctor if you are having problems. It's also a good idea to know your test results and keep a list of the medicines you take. How can you care for yourself at home? Regular exams to look for colon polyps are the best way to prevent polyps from turning into colon cancer. These can include stool tests, sigmoidoscopy, colonoscopy, and CT colonography. Talk with your doctor about a testing schedule that is right for you. To prevent polyps  There is no home treatment that can prevent colon polyps. But these steps may help lower your risk for cancer. · Stay active. Being active can help you get to and stay at a healthy weight. Try to exercise on most days of the week. Walking is a good choice. · Eat well. Choose a variety of vegetables, fruits, legumes (such as peas and beans), fish, poultry, and whole grains. · Do not smoke.  If you need help quitting, talk to your doctor about stop-smoking programs and medicines. These can increase your chances of quitting for good. · If you drink alcohol, limit how much you drink. Limit alcohol to 2 drinks a day for men and 1 drink a day for women. When should you call for help? Call your doctor now or seek immediate medical care if:  · You have severe belly pain. · Your stools are maroon or very bloody. Watch closely for changes in your health, and be sure to contact your doctor if:  · You have a fever. · You have nausea or vomiting. · You have a change in bowel habits (new constipation or diarrhea). · Your symptoms get worse or are not improving as expected. Where can you learn more? Go to http://yosef-steph.info/. Enter 95 [id number] in the search box to learn more about \"Colon Polyps: Care Instructions. \"  Current as of: August 24, 2016  Content Version: 11.2  © 1168-0468 [employer]. Care instructions adapted under license by [employer] (which disclaims liability or warranty for this information). If you have questions about a medical condition or this instruction, always ask your healthcare professional. [place] [address] any warranty or liability for your use of this information. DISCHARGE SUMMARY from Nurse    The following personal items are in your possession at time of discharge:    Dental Appliances: Partials, Uppers (2 teeth)  Visual Aid: Glasses                  PATIENT INSTRUCTIONS:    After general anesthesia or intravenous sedation, for 24 hours or while taking prescription Narcotics:  · Limit your activities  · Do not drive and operate hazardous machinery  · Do not make important personal or business decisions  · Do  not drink alcoholic beverages  · If you have not urinated within 8 hours after discharge, please contact your surgeon on call.     Report the following to your surgeon:  · Excessive pain, swelling, redness or odor of or around the surgical area  · Temperature over 100.5  · Nausea and vomiting lasting longer than 4 hours or if unable to take medications  · Any signs of decreased circulation or nerve impairment to extremity: change in color, persistent  numbness, tingling, coldness or increase pain  · Any questions      *  Please give a list of your current medications to your Primary Care Provider. *  Please update this list whenever your medications are discontinued, doses are      changed, or new medications (including over-the-counter products) are added. *  Please carry medication information at all times in case of emergency situations. These are general instructions for a healthy lifestyle:    No smoking/ No tobacco products/ Avoid exposure to second hand smoke    Surgeon General's Warning:  Quitting smoking now greatly reduces serious risk to your health. Obesity, smoking, and sedentary lifestyle greatly increases your risk for illness    A healthy diet, regular physical exercise & weight monitoring are important for maintaining a healthy lifestyle    You may be retaining fluid if you have a history of heart failure or if you experience any of the following symptoms:  Weight gain of 3 pounds or more overnight or 5 pounds in a week, increased swelling in our hands or feet or shortness of breath while lying flat in bed. Please call your doctor as soon as you notice any of these symptoms; do not wait until your next office visit. Recognize signs and symptoms of STROKE:    F-face looks uneven    A-arms unable to move or move unevenly    S-speech slurred or non-existent    T-time-call 911 as soon as signs and symptoms begin-DO NOT go       Back to bed or wait to see if you get better-TIME IS BRAIN. Warning Signs of HEART ATTACK     Call 911 if you have these symptoms:   Chest discomfort. Most heart attacks involve discomfort in the center of the chest that lasts more than a few minutes, or that goes away and comes back.  It can feel like uncomfortable pressure, squeezing, fullness, or pain.  Discomfort in other areas of the upper body. Symptoms can include pain or discomfort in one or both arms, the back, neck, jaw, or stomach.  Shortness of breath with or without chest discomfort.  Other signs may include breaking out in a cold sweat, nausea, or lightheadedness. Don't wait more than five minutes to call 911 - MINUTES MATTER! Fast action can save your life. Calling 911 is almost always the fastest way to get lifesaving treatment. Emergency Medical Services staff can begin treatment when they arrive -- up to an hour sooner than if someone gets to the hospital by car. The discharge information has been reviewed with the patient and spouse. The patient and spouse verbalized understanding. Discharge medications reviewed with the patient and spouse and appropriate educational materials and side effects teaching were provided.

## 2017-06-01 NOTE — ANESTHESIA POSTPROCEDURE EVALUATION
Post-Anesthesia Evaluation and Assessment    Patient: Loretta Story MRN: [id number]  SSN: [id number]    YOB: 1965  Age: 46 y.o. Sex: female       Cardiovascular Function/Vital Signs  Visit Vitals    /55    Pulse 62    Temp 36.6 °C (97.8 °F)    Resp 13    Ht 4' 11\" (1.499 m)    Wt 98.4 kg (217 lb)    SpO2 100%    Breastfeeding No    BMI 43.83 kg/m2       Patient is status post MAC anesthesia for Procedure(s):  COLONOSCOPY with Polypectomy. Nausea/Vomiting: None    Postoperative hydration reviewed and adequate. Pain:  Pain Scale 1: Numeric (0 - 10) (06/01/17 1032)  Pain Intensity 1: 0 (06/01/17 1032)   Managed    Neurological Status: At baseline    Mental Status and Level of Consciousness: Arousable    Pulmonary Status:   O2 Device: Room air (06/01/17 1032)   Adequate oxygenation and airway patent    Complications related to anesthesia: None    Post-anesthesia assessment completed.  No concerns    Signed By: Dax Schofield MD     June 1, 2017

## 2017-06-01 NOTE — ANESTHESIA PREPROCEDURE EVALUATION
Anesthetic History   No history of anesthetic complications            Review of Systems / Medical History  Patient summary reviewed and pertinent labs reviewed    Pulmonary  Within defined limits          Asthma : well controlled       Neuro/Psych   Within defined limits           Cardiovascular    Hypertension          Past MI and CAD    Exercise tolerance: >4 METS     GI/Hepatic/Renal  Within defined limits   GERD           Endo/Other  Within defined limits    Hypothyroidism  Morbid obesity     Other Findings   Comments:   Risk Factors for Postoperative nausea/vomiting:       History of postoperative nausea/vomiting? NO       Female? NO       Motion sickness? NO       Intended opioid administration for postoperative analgesia? NO      Smoking Abstinence  Current Smoker? NO  Elective Surgery? YES  Seen preoperatively by anesthesiologist or proxy prior to day of surgery? YES  Pt abstained from smoking 24 hours prior to anesthesia?  YES               Physical Exam    Airway  Mallampati: II  TM Distance: 4 - 6 cm  Neck ROM: normal range of motion   Mouth opening: Normal     Cardiovascular    Rhythm: regular  Rate: normal         Dental    Dentition: Upper partial plate     Pulmonary  Breath sounds clear to auscultation               Abdominal  GI exam deferred       Other Findings            Anesthetic Plan    ASA: 3  Anesthesia type: MAC          Induction: Intravenous  Anesthetic plan and risks discussed with: Patient

## 2017-06-01 NOTE — IP AVS SNAPSHOT
Belinda Cabral 
 
 
 [address] [facility] [address]43 [phone] Patient: Sofia Gaxiola MRN: [id number] UPY:3/1/1989 You are allergic to the following No active allergies Recent Documentation Height Weight Breastfeeding? BMI OB Status Smoking Status 1.499 m 98.4 kg No 43.83 kg/m2 Postmenopausal Never Smoker Emergency Contacts Name Discharge Info Relation Home Work Mobile Trisha Clark CAREGIVER [3] Spouse [3] [phone] About your hospitalization You were admitted on:  June 1, 2017 You last received care in the:  [facility] PAC[facility] You were discharged on:  June 1, 2017 Unit phone number:  [phone] Why you were hospitalized Your primary diagnosis was:  Not on File Providers Seen During Your Hospitalizations Provider Role Specialty Primary office phone Brianna Hand MD Attending Provider Colon and Rectal Surgery [phone] Your Primary Care Physician (PCP) Primary Care Physician Office Phone Office Fax Jackie Rubio [phone] Follow-up Information Follow up With Details Comments Contact Info John [facility], [address] [address] 
[phone] MD Ann Marie Ariza [facility] [address] [facility] [address] [place] [address] 
[phone] Current Discharge Medication List  
  
CONTINUE these medications which have NOT CHANGED Dose & Instructions Dispensing Information Comments Morning Noon Evening Bedtime  
 albuterol 90 mcg/actuation inhaler Commonly known as:  PROVENTIL HFA, VENTOLIN HFA, PROAIR HFA Your last dose was: Your next dose is:    
   
   
 Dose:  2 Puff Take 2 Puffs by inhalation every four (4) hours as needed for Wheezing. Quantity:  3 Inhaler Refills:  3 CRESTOR 5 mg tablet Generic drug:  rosuvastatin Your last dose was: Your next dose is: Take  by mouth nightly. Refills:  0  
     
   
   
   
  
 desloratadine 5 mg tablet Commonly known as:  CLARINEX Your last dose was: Your next dose is:    
   
   
 Dose:  5 mg Take 1 Tab by mouth daily. Quantity:  90 Tab Refills:  3  
     
   
   
   
  
 fluticasone-salmeterol 100-50 mcg/dose diskus inhaler Commonly known as:  ADVAIR Your last dose was: Your next dose is:    
   
   
 Dose:  1 Puff Take 1 Puff by inhalation every twelve (12) hours. Quantity:  3 Inhaler Refills:  3 HYDROcodone-acetaminophen 5-325 mg per tablet Commonly known as:  Tony Cervantes Your last dose was: Your next dose is: Take 1-2 tablets PO every 4-6 hours as needed for pain control. If over the counter ibuprofen or acetaminophen was suggested, then only take the vicodin for pain not well controlled with the over the counter medication. Quantity:  6 Tab Refills:  0  
     
   
   
   
  
 lisinopril-hydroCHLOROthiazide 10-12.5 mg per tablet Commonly known as:  Joel Muniz Your last dose was: Your next dose is:    
   
   
 Dose:  1 Tab Take 1 Tab by mouth daily. Quantity:  30 Tab Refills:  2  
     
   
   
   
  
 mometasone 50 mcg/actuation nasal spray Commonly known as:  Orlando Higgins Your last dose was: Your next dose is:    
   
   
 Dose:  2 Spray 2 Sprays by Both Nostrils route daily. Quantity:  1 Container Refills:  0  
     
   
   
   
  
 omega-3 acid ethyl esters 1 gram capsule Commonly known as:  Freya Cortés Your last dose was: Your next dose is:    
   
   
 Dose:  2 g Take 2 Caps by mouth two (2) times daily (with meals). Indications: HYPERTRIGLYCERIDEMIA Quantity:  360 Cap Refills:  3  OTHER  
   
 Your last dose was: Your next dose is:    
   
   
 Indications: fish oil Refills:  0 STOP taking these medications   
 aspirin 81 mg chewable tablet Discharge Instructions No aspirin or ibuprofen (e.g. Aleve, Motrin, Advil) for 5 days. Repeat colonoscopy in 5 years.  
   
 
Colonoscopy: What to Expect at [facility] Your Recovery After you have a colonoscopy, you will stay at the clinic for 1 to 2 hours until the medicines wear off. Then you can go home. But you will need to arrange for a ride. Your doctor will tell you when you can eat and do your other usual activities. Your doctor will talk to you about when you will need your next colonoscopy. Your doctor can help you decide how often you need to be checked. This will depend on the results of your test and your risk for colorectal cancer. After the test, you may be bloated or have gas pains. You may need to pass gas. If a biopsy was done or a polyp was removed, you may have streaks of blood in your stool (feces) for a few days. This care sheet gives you a general idea about how long it will take for you to recover. But each person recovers at a different pace. Follow the steps below to get better as quickly as possible. How can you care for yourself at home? Activity · Rest when you feel tired. · You can do your normal activities when it feels okay to do so. Diet · Follow your doctor's directions for eating. · Unless your doctor has told you not to, drink plenty of fluids. This helps to replace the fluids that were lost during the colon prep. · Do not drink alcohol. Medicines · Your doctor will tell you if and when you can restart your medicines. He or she will also give you instructions about taking any new medicines. · If you take blood thinners, such as warfarin (Coumadin), clopidogrel (Plavix), or aspirin, be sure to talk to your doctor.  He or she will tell you if and when to start taking those medicines again. Make sure that you understand exactly what your doctor wants you to do. · If polyps were removed or a biopsy was done during the test, your doctor may tell you not to take aspirin or other anti-inflammatory medicines for a few days. These include ibuprofen (Advil, Motrin) and naproxen (Aleve). Other instructions · For your safety, do not drive or operate machinery until the medicine wears off and you can think clearly. Your doctor may tell you not to drive or operate machinery until the day after your test. 
· Do not sign legal documents or make major decisions until the medicine wears off and you can think clearly. The anesthesia can make it hard for you to fully understand what you are agreeing to. Follow-up care is a key part of your treatment and safety. Be sure to make and go to all appointments, and call your doctor if you are having problems. It's also a good idea to know your test results and keep a list of the medicines you take. When should you call for help? Call 911 anytime you think you may need emergency care. For example, call if: 
· You passed out (lost consciousness). · You pass maroon or bloody stools. · You have severe belly pain. Call your doctor now or seek immediate medical care if: 
· Your stools are black and tarlike. · Your stools have streaks of blood, but you did not have a biopsy or any polyps removed. · You have belly pain, or your belly is swollen and firm. · You vomit. · You have a fever. · You are very dizzy. Watch closely for changes in your health, and be sure to contact your doctor if you have any problems. Where can you learn more? Go to http://yosef-steph.info/. Enter E264 in the search box to learn more about \"Colonoscopy: What to Expect at Home. \" Current as of: August 9, 2016 Content Version: 11.2 © 0042-8713 [employer].  Care instructions adapted under license by [address] (which disclaims liability or warranty for this information). If you have questions about a medical condition or this instruction, always ask your healthcare professional. [address] any warranty or liability for your use of this information. Colon Polyps: Care Instructions Your Care Instructions Colon polyps are growths in the colon or the rectum. The cause of most colon polyps is not known, and most people who get them do not have any problems. But a certain kind can turn into cancer. For this reason, regular testing for colon polyps is important for people age 48 and older and anyone who has an increased risk for colon cancer. Polyps are usually found through routine colon cancer screening tests. Although most colon polyps are not cancerous, they are usually removed and then tested for cancer. Screening for colon cancer saves lives because the cancer can usually be cured if it is caught early. If you have a polyp that is the type that can turn into cancer, you may need more tests to examine your entire colon. The doctor will remove any other polyps that he or she finds, and you will be tested more often. Follow-up care is a key part of your treatment and safety. Be sure to make and go to all appointments, and call your doctor if you are having problems. It's also a good idea to know your test results and keep a list of the medicines you take. How can you care for yourself at home? Regular exams to look for colon polyps are the best way to prevent polyps from turning into colon cancer. These can include stool tests, sigmoidoscopy, colonoscopy, and CT colonography. Talk with your doctor about a testing schedule that is right for you. To prevent polyps There is no home treatment that can prevent colon polyps. But these steps may help lower your risk for cancer. · Stay active.  Being active can help you get to and stay at a healthy weight. Try to exercise on most days of the week. Walking is a good choice. · Eat well. Choose a variety of vegetables, fruits, legumes (such as peas and beans), fish, poultry, and whole grains. · Do not smoke. If you need help quitting, talk to your doctor about stop-smoking programs and medicines. These can increase your chances of quitting for good. · If you drink alcohol, limit how much you drink. Limit alcohol to 2 drinks a day for men and 1 drink a day for women. When should you call for help? Call your doctor now or seek immediate medical care if: 
· You have severe belly pain. · Your stools are maroon or very bloody. Watch closely for changes in your health, and be sure to contact your doctor if: 
· You have a fever. · You have nausea or vomiting. · You have a change in bowel habits (new constipation or diarrhea). · Your symptoms get worse or are not improving as expected. Where can you learn more? Go to http://yosef-steph.info/. Enter 95 [id number] in the search box to learn more about \"Colon Polyps: Care Instructions. \" Current as of: August 24, 2016 Content Version: 11.2 © 9564-9723 [employer]. Care instructions adapted under license by [employer] (which disclaims liability or warranty for this information). If you have questions about a medical condition or this instruction, always ask your healthcare professional. [address] [address] any warranty or liability for your use of this information. DISCHARGE SUMMARY from Nurse The following personal items are in your possession at time of discharge: 
 
Dental Appliances: Partials, Uppers (2 teeth) Visual Aid: Glasses PATIENT INSTRUCTIONS: 
 
 
F-face looks uneven A-arms unable to move or move unevenly S-speech slurred or non-existent T-time-call 911 as soon as signs and symptoms begin-DO NOT go  
 Back to bed or wait to see if you get better-TIME IS BRAIN. Warning Signs of HEART ATTACK Call 911 if you have these symptoms: 
? Chest discomfort. Most heart attacks involve discomfort in the center of the chest that lasts more than a few minutes, or that goes away and comes back. It can feel like uncomfortable pressure, squeezing, fullness, or pain. ? Discomfort in other areas of the upper body. Symptoms can include pain or discomfort in one or both arms, the back, neck, jaw, or stomach. ? Shortness of breath with or without chest discomfort. ? Other signs may include breaking out in a cold sweat, nausea, or lightheadedness. Don't wait more than five minutes to call [address]! Fast action can save your life. Calling 911 is almost always the fastest way to get lifesaving treatment. Emergency Medical Services staff can begin treatment when they arrive  up to an hour sooner than if someone gets to the hospital by car. The discharge information has been reviewed with the patient and spouse. The patient and spouse verbalized understanding. Discharge medications reviewed with the patient and spouse and appropriate educational materials and side effects teaching were provided. Discharge Orders None Introducing [facility]! Miriam Yang introduces [email or web address] patient portal. Now you can access parts of your medical record, email your doctor's office, and request medication refills online. 1. In your internet browser, go to [email or web address] 2. Click on the First Time User? Click Here link in the Sign In box. You will see the New Member Sign Up page. 3. Enter your [employer] Access Code exactly as it appears below. You will not need to use this code after youve completed the sign-up process. If you do not sign up before the expiration date, you must request a new code. · [employer] Access Code: [email or web address] Expires: 7/30/2017 12:49 AM 
 4. Enter the last four digits of your Social Security Number (xxxx) and Date of Birth (mm/dd/yyyy) as indicated and click Submit. You will be taken to the next sign-up page. 5. Create a [employer] ID. This will be your [employer] login ID and cannot be changed, so think of one that is secure and easy to remember. 6. Create a [employer] password. You can change your password at any time. 7. Enter your Password Reset Question and Answer. This can be used at a later time if you forget your password. 8. Enter your e-mail address. You will receive e-mail notification when new information is available in [address] 9. Click Sign Up. You can now view and download portions of your medical record. 10. Click the Download Summary menu link to download a portable copy of your medical information. If you have questions, please visit the Frequently Asked Questions section of the [employer] website. Remember, [employer] is NOT to be used for urgent needs. For medical emergencies, dial 911. Now available from your iPhone and Android! General Information Please provide this summary of care documentation to your next provider. Patient Signature:  ____________________________________________________________ Date:  ____________________________________________________________  
  
Jenise Caba Provider Signature:  ____________________________________________________________ Date:  ____________________________________________________________

## 2017-06-01 NOTE — PROCEDURES
[facility]  [address] [place] [address], [address]  [phone]                    Colonoscopy Procedure Note      Odalys Miguel  1965  [id number]                Date of Procedure: 6/1/2017    Preoperative diagnosis: Colon cancer screening [Z12.11]    Postoperative diagnosis: Sigmoid Polyp    :  Oscar Hernandez MD    Assistant(s): Endoscopy Technician-1: Mendel Kirk Sodusta  Endoscopy RN-1: Nuno Segundo RN    Sedation: See Anesthesia Record    Procedure Details:  Prior to the procedure, a history and physical were performed. The patients medications, allergies and sensitivities were reviewed and all questions were answered. After informed consent was obtained for the procedure, with all risks and benefits of procedure explained. The patient was taken to the endoscopy suite and placed in the left lateral decubitus position. Patient identification and proposed procedure were verified prior to the procedure by the nurse and I. Following sequential administration of sedation as per Anesthesia, a digital rectal exam was performed and was normal.  The Olympus video colonoscope was introduced through the anus and advanced to cecum, which was identified by the ileocecal valve and appendiceal orifice. The quality of preparation was good. The colonoscope was slowly withdrawn and the mucosa examined for any abnormalities. Cecal withdrawl time was greater than six minutes. The patient tolerated the procedure well. Findings/Interventions:   Polyps - #1, 5 mm in size, located in the sigmoid, removed by cold biopsy and sent for pathology    EBL: none    Recommendations: -Repeat colonoscopy in 5 years. NO aspirin for 5 days.      Discharge Disposition:  Meeta Torres MD  6/1/2017  10:27 AM

## 2017-06-02 LAB
BUN BLD-MCNC: 5 MG/DL (ref 7–18)
CHLORIDE BLD-SCNC: 101 MMOL/L (ref 100–108)
GLUCOSE BLD STRIP.AUTO-MCNC: 110 MG/DL (ref 74–106)
HCT VFR BLD CALC: 41 % (ref 36–49)
HGB BLD-MCNC: 13.9 G/DL (ref 12–16)
POTASSIUM BLD-SCNC: 3.7 MMOL/L (ref 3.5–5.5)
SODIUM BLD-SCNC: 141 MMOL/L (ref 136–145)

## 2017-06-16 ENCOUNTER — TELEPHONE (OUTPATIENT)
Dept: SURGERY | Age: 52
End: 2017-06-16

## 2017-06-16 NOTE — TELEPHONE ENCOUNTER
----- Message from Shekhar Ruth MD sent at 6/5/2017 11:14 AM EDT -----  Benign polyp(s). Repeat colonoscopy in 5 years as planned. LM for patient to call our office regarding colonoscopy results.

## 2017-06-19 ENCOUNTER — DOCUMENTATION ONLY (OUTPATIENT)
Dept: SURGERY | Age: 52
End: 2017-06-19

## 2017-06-19 NOTE — PROGRESS NOTES
LM on patients voicemail regarding her colonoscopy results per patient request. She is to call back with any questions.

## 2017-08-01 DIAGNOSIS — I10 ESSENTIAL HYPERTENSION: ICD-10-CM

## 2017-08-01 RX ORDER — LISINOPRIL AND HYDROCHLOROTHIAZIDE 10; 12.5 MG/1; MG/1
1 TABLET ORAL DAILY
Qty: 30 TAB | Refills: 0 | Status: SHIPPED | OUTPATIENT
Start: 2017-08-01 | End: 2017-09-12 | Stop reason: SDUPTHER

## 2017-09-10 ENCOUNTER — APPOINTMENT (OUTPATIENT)
Dept: GENERAL RADIOLOGY | Age: 52
End: 2017-09-10
Attending: EMERGENCY MEDICINE
Payer: COMMERCIAL

## 2017-09-10 ENCOUNTER — HOSPITAL ENCOUNTER (EMERGENCY)
Age: 52
Discharge: HOME | End: 2017-09-10
Attending: EMERGENCY MEDICINE
Payer: COMMERCIAL

## 2017-09-10 VITALS
WEIGHT: 212 LBS | OXYGEN SATURATION: 100 % | DIASTOLIC BLOOD PRESSURE: 60 MMHG | TEMPERATURE: 98.2 F | HEART RATE: 68 BPM | HEIGHT: 59 IN | BODY MASS INDEX: 42.74 KG/M2 | SYSTOLIC BLOOD PRESSURE: 117 MMHG | RESPIRATION RATE: 18 BRPM

## 2017-09-10 DIAGNOSIS — N30.01 ACUTE CYSTITIS WITH HEMATURIA: Primary | ICD-10-CM

## 2017-09-10 LAB
ALBUMIN SERPL-MCNC: 3 G/DL (ref 3.4–5)
ALBUMIN/GLOB SERPL: 0.6 {RATIO} (ref 0.8–1.7)
ALP SERPL-CCNC: 73 U/L (ref 45–117)
ALT SERPL-CCNC: 52 U/L (ref 13–56)
ANION GAP SERPL CALC-SCNC: 9 MMOL/L (ref 3–18)
APPEARANCE UR: ABNORMAL
AST SERPL-CCNC: 41 U/L (ref 15–37)
BACTERIA URNS QL MICRO: ABNORMAL /HPF
BASOPHILS # BLD: 0.1 K/UL (ref 0–0.06)
BASOPHILS NFR BLD: 1 % (ref 0–2)
BILIRUB SERPL-MCNC: 0.3 MG/DL (ref 0.2–1)
BILIRUB UR QL: NEGATIVE
BUN SERPL-MCNC: 9 MG/DL (ref 7–18)
BUN/CREAT SERPL: 9 (ref 12–20)
CALCIUM SERPL-MCNC: 8.9 MG/DL (ref 8.5–10.1)
CHLORIDE SERPL-SCNC: 105 MMOL/L (ref 100–108)
CO2 SERPL-SCNC: 29 MMOL/L (ref 21–32)
COLOR UR: ABNORMAL
CREAT SERPL-MCNC: 1.02 MG/DL (ref 0.6–1.3)
DIFFERENTIAL METHOD BLD: ABNORMAL
EOSINOPHIL # BLD: 0.3 K/UL (ref 0–0.4)
EOSINOPHIL NFR BLD: 3 % (ref 0–5)
EPITH CASTS URNS QL MICRO: ABNORMAL /LPF (ref 0–5)
ERYTHROCYTE [DISTWIDTH] IN BLOOD BY AUTOMATED COUNT: 14.2 % (ref 11.6–14.5)
GLOBULIN SER CALC-MCNC: 5.4 G/DL (ref 2–4)
GLUCOSE SERPL-MCNC: 104 MG/DL (ref 74–99)
GLUCOSE UR STRIP.AUTO-MCNC: NEGATIVE MG/DL
HCT VFR BLD AUTO: 42.4 % (ref 35–45)
HGB BLD-MCNC: 13.8 G/DL (ref 12–16)
HGB UR QL STRIP: ABNORMAL
HYALINE CASTS URNS QL MICRO: ABNORMAL /LPF (ref 0–2)
KETONES UR QL STRIP.AUTO: ABNORMAL MG/DL
LEUKOCYTE ESTERASE UR QL STRIP.AUTO: ABNORMAL
LYMPHOCYTES # BLD: 3.2 K/UL (ref 0.9–3.6)
LYMPHOCYTES NFR BLD: 37 % (ref 21–52)
MCH RBC QN AUTO: 27.2 PG (ref 24–34)
MCHC RBC AUTO-ENTMCNC: 32.5 G/DL (ref 31–37)
MCV RBC AUTO: 83.6 FL (ref 74–97)
MONOCYTES # BLD: 0.9 K/UL (ref 0.05–1.2)
MONOCYTES NFR BLD: 10 % (ref 3–10)
NEUTS SEG # BLD: 4.3 K/UL (ref 1.8–8)
NEUTS SEG NFR BLD: 49 % (ref 40–73)
NITRITE UR QL STRIP.AUTO: NEGATIVE
PH UR STRIP: 6 [PH] (ref 5–8)
PLATELET # BLD AUTO: 369 K/UL (ref 135–420)
PMV BLD AUTO: 9 FL (ref 9.2–11.8)
POTASSIUM SERPL-SCNC: 3.7 MMOL/L (ref 3.5–5.5)
PROT SERPL-MCNC: 8.4 G/DL (ref 6.4–8.2)
PROT UR STRIP-MCNC: 30 MG/DL
RBC # BLD AUTO: 5.07 M/UL (ref 4.2–5.3)
RBC #/AREA URNS HPF: ABNORMAL /HPF (ref 0–5)
SODIUM SERPL-SCNC: 143 MMOL/L (ref 136–145)
SP GR UR REFRACTOMETRY: 1.03 (ref 1–1.03)
UROBILINOGEN UR QL STRIP.AUTO: 1 EU/DL (ref 0.2–1)
WBC # BLD AUTO: 8.7 K/UL (ref 4.6–13.2)
WBC URNS QL MICRO: ABNORMAL /HPF (ref 0–4)

## 2017-09-10 PROCEDURE — 74011000258 HC RX REV CODE- 258: Performed by: EMERGENCY MEDICINE

## 2017-09-10 PROCEDURE — 80053 COMPREHEN METABOLIC PANEL: CPT | Performed by: EMERGENCY MEDICINE

## 2017-09-10 PROCEDURE — 85025 COMPLETE CBC W/AUTO DIFF WBC: CPT | Performed by: EMERGENCY MEDICINE

## 2017-09-10 PROCEDURE — 96365 THER/PROPH/DIAG IV INF INIT: CPT

## 2017-09-10 PROCEDURE — 99284 EMERGENCY DEPT VISIT MOD MDM: CPT

## 2017-09-10 PROCEDURE — 94640 AIRWAY INHALATION TREATMENT: CPT

## 2017-09-10 PROCEDURE — 77030029684 HC NEB SM VOL KT MONA -A

## 2017-09-10 PROCEDURE — 74011000250 HC RX REV CODE- 250: Performed by: EMERGENCY MEDICINE

## 2017-09-10 PROCEDURE — 81001 URINALYSIS AUTO W/SCOPE: CPT | Performed by: EMERGENCY MEDICINE

## 2017-09-10 PROCEDURE — 74011250636 HC RX REV CODE- 250/636: Performed by: EMERGENCY MEDICINE

## 2017-09-10 PROCEDURE — 71020 XR CHEST PA LAT: CPT

## 2017-09-10 PROCEDURE — 96361 HYDRATE IV INFUSION ADD-ON: CPT

## 2017-09-10 RX ORDER — CEFTRIAXONE 1 G/1
1 INJECTION, POWDER, FOR SOLUTION INTRAMUSCULAR; INTRAVENOUS
Status: DISCONTINUED | OUTPATIENT
Start: 2017-09-10 | End: 2017-09-10 | Stop reason: CLARIF

## 2017-09-10 RX ORDER — IPRATROPIUM BROMIDE AND ALBUTEROL SULFATE 2.5; .5 MG/3ML; MG/3ML
3 SOLUTION RESPIRATORY (INHALATION)
Status: COMPLETED | OUTPATIENT
Start: 2017-09-10 | End: 2017-09-10

## 2017-09-10 RX ORDER — SULFAMETHOXAZOLE AND TRIMETHOPRIM 800; 160 MG/1; MG/1
1 TABLET ORAL 2 TIMES DAILY
Qty: 14 TAB | Refills: 0 | Status: SHIPPED | OUTPATIENT
Start: 2017-09-10 | End: 2017-09-17

## 2017-09-10 RX ADMIN — IPRATROPIUM BROMIDE AND ALBUTEROL SULFATE 3 ML: .5; 3 SOLUTION RESPIRATORY (INHALATION) at 10:53

## 2017-09-10 RX ADMIN — SODIUM CHLORIDE 1000 ML: 900 INJECTION, SOLUTION INTRAVENOUS at 11:18

## 2017-09-10 RX ADMIN — CEFTRIAXONE 1 G: 1 INJECTION, POWDER, FOR SOLUTION INTRAMUSCULAR; INTRAVENOUS at 11:28

## 2017-09-10 NOTE — ED TRIAGE NOTES
Pt presents to the ED with chills, upper and lower back pain onset Thursday evening. Pt reports having chills, states unable to check temperature at home, denies having thermometer. Pt reports decreased appetite and states notice her tongue appeared white. Pt denied mouth pain at this present time.

## 2017-09-10 NOTE — ED NOTES
I have reviewed discharge instructions with the patient. The patient verbalized understanding. Current Discharge Medication List      START taking these medications    Details   trimethoprim-sulfamethoxazole (BACTRIM DS) 160-800 mg per tablet Take 1 Tab by mouth two (2) times a day for 7 days.   Qty: 14 Tab, Refills: 0         Patient armband removed and shredded

## 2017-09-10 NOTE — LETTER
NOTIFICATION RETURN TO WORK / SCHOOL 
 
9/10/2017 1:20 PM 
 
Ms. Meg Stewart [place] [address] [facility] [address] To Whom It May Concern: 
 
Mei Martinez is currently under the care of [address] [place] [address] EMERGENCY DEPT. She will return to work/school on: Tuesday, September 12, 2017 If there are questions or concerns please have the patient contact our office. Sincerely, [address] Zhang, DO

## 2017-09-10 NOTE — ED PROVIDER NOTES
HPI Comments: Omar Fuchs is a 46 y.o. female presenting to the ED c/o chills, decreased appetite, and  productive cough of clear phlegm for the past 3 days. Pt also c/o intermittent back pain and denies heavy lifting. Pt states, \"I had a heart attack a couple of years ago. \" PMHx includes asthma, CAD, hypertension, and hyperlipidemia. Denies chest pain, dysuria, hematuria, nausea, vomiting, diarrhea, and any other symptoms or complaints. Patient is a 46 y.o. female presenting with chills, back pain, headaches, and mouth pain. Chills    Associated symptoms include headaches and cough. Pertinent negatives include no chest pain, no diarrhea, no vomiting, no congestion, no sore throat, no shortness of breath, no neck pain and no rash. Back Pain    Associated symptoms include headaches. Pertinent negatives include no chest pain, no fever, no numbness, no abdominal pain, no dysuria and no weakness. Headache    Pertinent negatives include no fever, no shortness of breath, no weakness, no nausea and no vomiting. Mouth Pain    Associated symptoms include headaches and cough. Pertinent negatives include no fever, no abdominal pain, no constipation, no diarrhea, no nausea, no vomiting, no congestion, no rhinorrhea, no sore throat, no neck pain, no wheezing, no rash, no eye discharge, no eye pain and no eye redness. Past Medical History:   Diagnosis Date    Acute shoulder pain due to trauma 5/17/2016    Allergic rhinitis 11/9/2009    Asthma 2015    PFT confirmed    CAD (coronary artery disease)     hx mi-2013 ?     Carpal tunnel syndrome 11/9/2009    Chronic low back pain without sciatica 5/17/2016    Excessive use of nonsteroidal anti-inflammatory drug (NSAID) 5/17/2016    Fibroid, uterine 11/9/2009    GERD (gastroesophageal reflux disease) 11/9/2009    Heart failure ([address]     heart attack 3 years ago    HLD (hyperlipidemia) 11/9/2009    Hypertension     Hypertriglyceridemia 5/17/2016    Hypothyroidism 5/17/2016    Knee pain, right     SOB (shortness of breath)     Weight gain 5/17/2016       Past Surgical History:   Procedure Laterality Date    COLONOSCOPY N/A 6/1/2017    COLONOSCOPY with Polypectomy performed by Brenda Edouard MD at [address] HX BREAST BIOPSY      benign, left    HX GYN      fibroids removed    HX MYOMECTOMY      open first, then embolization 2nd time.  HX OTHER SURGICAL      cyst removed    HX TONSILLECTOMY      [place] COLONOSCOPY W/BIOPSY SINGLE/MULTIPLE N/A 06/01/2017    Dr. Kt Vargas         Family History:   Problem Relation Age of Onset    Stroke Father      mini strokes, not sure of number    Hypertension Father     Cancer Maternal Grandfather      not sure type       Social History     Social History    Marital status:      Spouse name: N/A    Number of children: N/A    Years of education: N/A     Occupational History    Not on file. Social History Main Topics    Smoking status: Never Smoker    Smokeless tobacco: Never Used    Alcohol use No    Drug use: No    Sexual activity: Yes     Partners: Male     Birth control/ protection: None     Other Topics Concern     Service No    Blood Transfusions No    Caffeine Concern No    Occupational Exposure No    Hobby Hazards No    Sleep Concern No    Stress Concern No    Weight Concern Yes    Special Diet No    Back Care Yes    Exercise No    Bike Helmet No    Seat Belt Yes     some times    Self-Exams Yes     Social History Narrative         ALLERGIES: Review of patient's allergies indicates no known allergies. Review of Systems   Constitutional: Positive for chills. Negative for diaphoresis and fever. HENT: Negative. Negative for congestion, rhinorrhea and sore throat. Eyes: Negative. Negative for pain, discharge and redness. Respiratory: Positive for cough. Negative for chest tightness, shortness of breath and wheezing.     Cardiovascular: Negative. Negative for chest pain. Gastrointestinal: Negative for abdominal pain, constipation, diarrhea, nausea and vomiting. Genitourinary: Negative. Negative for dysuria, flank pain, frequency, hematuria and urgency. Musculoskeletal: Positive for back pain. Negative for neck pain. Skin: Negative. Negative for rash. Neurological: Positive for headaches. Negative for syncope, weakness and numbness. Psychiatric/Behavioral: Negative. All other systems reviewed and are negative. Vitals:    09/10/17 1029 09/10/17 1205   BP: 131/66 117/60   Pulse: 76 68   Resp: 18 18   Temp: 98.2 °F (36.8 °C)    SpO2: 98% 100%   Weight: 96.2 kg (212 lb)    Height: 4' 11\" (1.499 m)             Physical Exam   Constitutional: She appears well-developed and well-nourished. Non-toxic appearance. She does not have a sickly appearance. She does not appear ill. No distress. Morbidly obese   HENT:   Head: Normocephalic and atraumatic. Mouth/Throat: Oropharynx is clear and moist. No oropharyngeal exudate. Eyes: Conjunctivae and EOM are normal. Pupils are equal, round, and reactive to light. No scleral icterus. Neck: Trachea normal and normal range of motion. Neck supple. No hepatojugular reflux and no JVD present. No tracheal deviation present. No thyromegaly present. Cardiovascular: Normal rate, regular rhythm, S1 normal, S2 normal, normal heart sounds, intact distal pulses and normal pulses. Exam reveals no gallop, no S3 and no S4. No murmur heard. Pulses:       Radial pulses are 2+ on the right side, and 2+ on the left side. Dorsalis pedis pulses are 2+ on the right side, and 2+ on the left side. Pulmonary/Chest: Effort normal. No accessory muscle usage. No respiratory distress. She has no decreased breath sounds. She has wheezes in the right lower field. She has no rhonchi. She has no rales. Abdominal: Soft.  Normal appearance and bowel sounds are normal. She exhibits no distension and no mass. There is no hepatosplenomegaly. There is no tenderness. There is no rigidity, no rebound, no guarding, no CVA tenderness, no tenderness at McBurney's point and negative Adame's sign. Musculoskeletal: Normal range of motion. Strength 4/5 throughout    Lymphadenopathy:        Head (right side): No submental, no submandibular, no preauricular and no occipital adenopathy present. Head (left side): No submental, no submandibular, no preauricular and no occipital adenopathy present. She has no cervical adenopathy. Right: No supraclavicular adenopathy present. Left: No supraclavicular adenopathy present. Neurological: She is alert. She has normal strength and normal reflexes. She is not disoriented. No cranial nerve deficit or sensory deficit. Coordination and gait normal. GCS eye subscore is 4. GCS verbal subscore is 5. GCS motor subscore is 6. Grossly intact    Skin: Skin is warm, dry and intact. No rash noted. She is not diaphoretic. Psychiatric: She has a normal mood and affect. Her speech is normal and behavior is normal. Judgment and thought content normal. Cognition and memory are normal.   Nursing note and vitals reviewed. MDM  Number of Diagnoses or Management Options  Acute cystitis with hematuria:   Diagnosis management comments: UTI  Pneumonia  Viral syndrome  Neoplasm       ED Course       Procedures      Labs essentially normal. UA showed large amounts of leukocytes. Chest X-Ray showed No acute process. 1:00 PM 9/10/2017     I have reassessed the patient. Patient is feeling better. Patient will be prescribed Bactrim. Patient was discharged in stable condition. Patient is to return to emergency department if any new or worsening condition.       Scribe Attestation      Quentin Gunderson acting as a scribe for and in the presence of Evelyn Richards, DO      September 10, 2017 at 1:01 PM       Provider Attestation:      I personally performed the services described in the documentation, reviewed the documentation, as recorded by the scribe in my presence, and it accurately and completely records my words and actions.  September 10, 2017 at 1:01 PM - [address], DO

## 2017-09-10 NOTE — DISCHARGE INSTRUCTIONS
Urinary Tract Infection in Women: Care Instructions  Your Care Instructions    A urinary tract infection, or UTI, is a general term for an infection anywhere between the kidneys and the urethra (where urine comes out). Most UTIs are bladder infections. They often cause pain or burning when you urinate. UTIs are caused by bacteria and can be cured with antibiotics. Be sure to complete your treatment so that the infection goes away. Follow-up care is a key part of your treatment and safety. Be sure to make and go to all appointments, and call your doctor if you are having problems. It's also a good idea to know your test results and keep a list of the medicines you take. How can you care for yourself at home? · Take your antibiotics as directed. Do not stop taking them just because you feel better. You need to take the full course of antibiotics. · Drink extra water and other fluids for the next day or two. This may help wash out the bacteria that are causing the infection. (If you have kidney, heart, or liver disease and have to limit fluids, talk with your doctor before you increase your fluid intake.)  · Avoid drinks that are carbonated or have caffeine. They can irritate the bladder. · Urinate often. Try to empty your bladder each time. · To relieve pain, take a hot bath or lay a heating pad set on low over your lower belly or genital area. Never go to sleep with a heating pad in place. To prevent UTIs  · Drink plenty of water each day. This helps you urinate often, which clears bacteria from your system. (If you have kidney, heart, or liver disease and have to limit fluids, talk with your doctor before you increase your fluid intake.)  · Urinate when you need to. · Urinate right after you have sex. · Change sanitary pads often. · Avoid douches, bubble baths, feminine hygiene sprays, and other feminine hygiene products that have deodorants.   · After going to the bathroom, wipe from front to back.  When should you call for help? Call your doctor now or seek immediate medical care if:  · Symptoms such as fever, chills, nausea, or vomiting get worse or appear for the first time. · You have new pain in your back just below your rib cage. This is called flank pain. · There is new blood or pus in your urine. · You have any problems with your antibiotic medicine. Watch closely for changes in your health, and be sure to contact your doctor if:  · You are not getting better after taking an antibiotic for 2 days. · Your symptoms go away but then come back. Where can you learn more? Go to http://yosef-steph.info/. Enter W905 in the search box to learn more about \"Urinary Tract Infection in Women: Care Instructions. \"  Current as of: November 28, 2016  Content Version: 11.3  © 1388-9185 [employer], [employer]. Care instructions adapted under license by [employer] (which disclaims liability or warranty for this information). If you have questions about a medical condition or this instruction, always ask your healthcare professional. [address] any warranty or liability for your use of this information.

## 2017-09-12 ENCOUNTER — OFFICE VISIT (OUTPATIENT)
Dept: FAMILY MEDICINE CLINIC | Age: 52
End: 2017-09-12

## 2017-09-12 VITALS
DIASTOLIC BLOOD PRESSURE: 58 MMHG | SYSTOLIC BLOOD PRESSURE: 127 MMHG | BODY MASS INDEX: 43.55 KG/M2 | TEMPERATURE: 98.7 F | HEART RATE: 69 BPM | OXYGEN SATURATION: 95 % | WEIGHT: 216 LBS | RESPIRATION RATE: 18 BRPM | HEIGHT: 59 IN

## 2017-09-12 DIAGNOSIS — I10 ESSENTIAL HYPERTENSION: Primary | ICD-10-CM

## 2017-09-12 DIAGNOSIS — E78.2 MIXED HYPERLIPIDEMIA: ICD-10-CM

## 2017-09-12 DIAGNOSIS — J45.30 MILD PERSISTENT ASTHMA WITHOUT COMPLICATION: ICD-10-CM

## 2017-09-12 DIAGNOSIS — R79.89 ELEVATED TSH: ICD-10-CM

## 2017-09-12 RX ORDER — MOMETASONE FUROATE 50 UG/1
2 SPRAY, METERED NASAL DAILY
Qty: 1 CONTAINER | Refills: 2 | Status: SHIPPED | OUTPATIENT
Start: 2017-09-12 | End: 2018-01-17 | Stop reason: SDUPTHER

## 2017-09-12 RX ORDER — FLUTICASONE PROPIONATE AND SALMETEROL 100; 50 UG/1; UG/1
1 POWDER RESPIRATORY (INHALATION) EVERY 12 HOURS
Qty: 3 INHALER | Refills: 3 | Status: SHIPPED | OUTPATIENT
Start: 2017-09-12 | End: 2018-03-20 | Stop reason: SDUPTHER

## 2017-09-12 RX ORDER — ALBUTEROL SULFATE 90 UG/1
2 AEROSOL, METERED RESPIRATORY (INHALATION)
Qty: 3 INHALER | Refills: 3 | Status: SHIPPED | OUTPATIENT
Start: 2017-09-12 | End: 2018-03-20 | Stop reason: SDUPTHER

## 2017-09-12 RX ORDER — LISINOPRIL AND HYDROCHLOROTHIAZIDE 10; 12.5 MG/1; MG/1
1 TABLET ORAL DAILY
Qty: 30 TAB | Refills: 5 | Status: SHIPPED | OUTPATIENT
Start: 2017-09-12 | End: 2017-12-12 | Stop reason: SDUPTHER

## 2017-09-12 NOTE — PROGRESS NOTES
Patient: Nadira Gilbert MRN: [id number]  SSN: [id number]    YOB: 1965  Age: 46 y.o. Sex: female      Date of Service: 9/12/2017   Provider: NIMA Pace         REASON FOR VISIT:   Chief Complaint   Patient presents with    Follow-up     HTN        VITALS:   Visit Vitals    /58    Pulse 69    Temp 98.7 °F (37.1 °C) (Oral)    Resp 18    Ht 4' 11\" (1.499 m)    Wt 216 lb (98 kg)    LMP 06/01/2017  Comment: patient had spotting tues and wed    SpO2 95%    BMI 43.63 kg/m2       MEDICATIONS:   Current Outpatient Prescriptions on File Prior to Visit   Medication Sig Dispense Refill    Omega-3 Fatty Acids (FISH OIL) 300 mg cap Take  by mouth.  trimethoprim-sulfamethoxazole (BACTRIM DS) 160-800 mg per tablet Take 1 Tab by mouth two (2) times a day for 7 days. 14 Tab 0    lisinopril-hydroCHLOROthiazide (PRINZIDE, ZESTORETIC) 10-12.5 mg per tablet Take 1 Tab by mouth daily. 30 Tab 0    HYDROcodone-acetaminophen (NORCO) 5-325 mg per tablet Take 1-2 tablets PO every 4-6 hours as needed for pain control. If over the counter ibuprofen or acetaminophen was suggested, then only take the vicodin for pain not well controlled with the over the counter medication. 6 Tab 0    rosuvastatin (CRESTOR) 5 mg tablet Take  by mouth nightly.  desloratadine (CLARINEX) 5 mg tablet Take 1 Tab by mouth daily. 90 Tab 3    omega-3 acid ethyl esters (LOVAZA) 1 gram capsule Take 2 Caps by mouth two (2) times daily (with meals). Indications: HYPERTRIGLYCERIDEMIA 360 Cap 3    mometasone (NASONEX) 50 mcg/actuation nasal spray 2 Sprays by Both Nostrils route daily. 1 Container 0    fluticasone-salmeterol (ADVAIR) 100-50 mcg/dose diskus inhaler Take 1 Puff by inhalation every twelve (12) hours. 3 Inhaler 3    albuterol (PROVENTIL HFA, VENTOLIN HFA, PROAIR HFA) 90 mcg/actuation inhaler Take 2 Puffs by inhalation every four (4) hours as needed for Wheezing.  3 Inhaler 3     No current facility-administered medications on file prior to visit. ALLERGIES:   No Known Allergies     ACTIVE MEDICAL PROBLEMS:  Patient Active Problem List   Diagnosis Code    HLD (hyperlipidemia) E78.5    Allergic rhinitis J30.9    BMI 40.0-44.9, adult ([facility] [address] [address] Z68.41    Hypertension I10    Elevated TSH R94.6    Asthma J45.909    Chronic low back pain without sciatica M54.5, G89.29    Excessive use of nonsteroidal anti-inflammatory drug (NSAID) F19.90    Hypertriglyceridemia E78.1    Weight gain R63.5    Compliance with medication regimen Z91.89        MEDICAL/SURGICAL HISTORY:  Past Medical History:   Diagnosis Date    Acute shoulder pain due to trauma 5/17/2016    Allergic rhinitis 11/9/2009    Asthma 2015    PFT confirmed    CAD (coronary artery disease)     hx mi-2013 ?  Carpal tunnel syndrome 11/9/2009    Chronic low back pain without sciatica 5/17/2016    Excessive use of nonsteroidal anti-inflammatory drug (NSAID) 5/17/2016    Fibroid, uterine 11/9/2009    GERD (gastroesophageal reflux disease) 11/9/2009    [facility] failure ([facility]     heart attack 3 years ago    HLD (hyperlipidemia) 11/9/2009    Hypertension     Hypertriglyceridemia 5/17/2016    Hypothyroidism 5/17/2016    Knee pain, right     SOB (shortness of breath)     Weight gain 5/17/2016      Past Surgical History:   Procedure Laterality Date    COLONOSCOPY N/A 6/1/2017    COLONOSCOPY with Polypectomy performed by Paulina French MD at [address] [place] [address] HX BREAST BIOPSY      benign, left    HX GYN      fibroids removed    HX MYOMECTOMY      open first, then embolization 2nd time.     HX OTHER SURGICAL      cyst removed    HX TONSILLECTOMY      [place] COLONOSCOPY W/BIOPSY SINGLE/MULTIPLE N/A 06/01/2017    Dr. Odalis Mcdonald:  Family History   Problem Relation Age of Onset    Stroke Father      mini strokes, not sure of number    Hypertension Father     Cancer Maternal Grandfather      not sure type SOCIAL HISTORY:  Social History   Substance Use Topics    Smoking status: Never Smoker    Smokeless tobacco: Never Used    Alcohol use No         HISTORY OF PRESENT ILLNESS: Mary Mueller is a 46 y.o. female who presents to the office for a routine follow up visit. Hypertension -   Currently, the patient's condition is well controlled. Reports compliance with the following regimen: lisinopril-HCTZ 10-12.5 mg,   Home BP readings: not checking   Lifestyle Modifications: working on eating a healthier diet and losing weight    Asthma -   Currently, the patient's condition is well controlled. Reports compliance with the following regimen: Advair 100-50 mcg BID, albuterol PRN  Frequency of rescue inhaler: Less than twice a week   Frequency of nighttime awakenings: never  Limitation of daily activity: none  Triggers: seasonal allergies, for which she takes OTC meds as needed. Also exercise. She did have some incidental wheezing noted on exam at her recent ER visit for UTI. Was given a breathing treatment with good response. CXR was negative. UTI -   Patient was in the ER over the weekend complaining of vague symptoms of chills and flank pain. Workup was essentially unremarkable with the exception of large leukocytes on UA. She was treated empirically with Bactrim. States she is feeling better today     REVIEW OF SYSTEMS:  Review of Systems   Constitutional: Negative for chills and fever. Respiratory: Negative for cough, shortness of breath and wheezing. Cardiovascular: Negative for chest pain and palpitations. Gastrointestinal: Negative for abdominal pain, nausea and vomiting. Neurological: Negative for dizziness and headaches. PHYSICAL EXAMINATION:  Physical Exam   Constitutional: She is well-developed, well-nourished, and in no distress. Cardiovascular: Normal rate, regular rhythm, normal heart sounds and intact distal pulses. Exam reveals no gallop and no friction rub.     No murmur heard. Pulmonary/Chest: Effort normal and breath sounds normal. She has no wheezes. She has no rales. Musculoskeletal: She exhibits no edema or deformity. Skin: Skin is warm and dry. Psychiatric: Mood and affect normal.        RESULTS:  No results found for this visit on 09/12/17. ASSESSMENT/PLAN:  Diagnoses and all orders for this visit:    1. Essential hypertension  -     lisinopril-hydroCHLOROthiazide (PRINZIDE, ZESTORETIC) 10-12.5 mg per tablet; Take 1 Tab by mouth daily. 2. Mild persistent asthma without complication  -     albuterol (PROVENTIL HFA, VENTOLIN HFA, PROAIR HFA) 90 mcg/actuation inhaler; Take 2 Puffs by inhalation every four (4) hours as needed for Wheezing. 3. Elevated TSH  -     TSH 3RD GENERATION; Future    4. Mixed hyperlipidemia  -     METABOLIC PANEL, COMPREHENSIVE; Future  -     LIPID PANEL; Future    Other orders  -     fluticasone-salmeterol (ADVAIR) 100-50 mcg/dose diskus inhaler; Take 1 Puff by inhalation every twelve (12) hours. -     mometasone (NASONEX) 50 mcg/actuation nasal spray; 2 Sprays by Both Nostrils route daily. Chronic conditions stable  Meds renewed as above  Follow up in 3 months or sooner as needed  Check labs prior to next visit     Patient expresses understanding and is agreeable with the above plan.       NIMA Bejarano   September 12, 2017    6:02 PM

## 2017-09-12 NOTE — MR AVS SNAPSHOT
Visit Information Date & Time Provider Department Dept. Phone Encounter #  
 9/12/2017  4:45 PM Sandy Johnson [facility] [phone] [id number] Upcoming Health Maintenance Date Due Pneumococcal 19-64 Medium Risk (1 of 1 - PPSV23) 4/2/1984 DTaP/Tdap/Td series (1 - Tdap) 4/2/1986 BREAST CANCER SCRN MAMMOGRAM 1/18/2018 PAP AKA CERVICAL CYTOLOGY 1/13/2020 COLONOSCOPY 6/1/2027 Allergies as of 9/12/2017  Review Complete On: 9/12/2017 By: NIMA Thompson No Known Allergies Current Immunizations  Never Reviewed No immunizations on file. Not reviewed this visit You Were Diagnosed With   
  
 Codes Comments Essential hypertension    -  Primary ICD-10-CM: I10 
ICD-9-CM: 401.9 Mild persistent asthma without complication     MSJ-71-LM: J45.30 ICD-9-CM: 493.90 Elevated TSH     ICD-10-CM: R94.6 ICD-9-CM: 794.5 Mixed hyperlipidemia     ICD-10-CM: E78.2 ICD-9-CM: 272.2 Vitals BP Pulse Temp Resp Height(growth percentile) Weight(growth percentile) 127/58 69 98.7 °F (37.1 °C) (Oral) 18 4' 11\" (1.499 m) 216 lb (98 kg) LMP SpO2 BMI OB Status Smoking Status 06/01/2017 95% 43.63 kg/m2 Postmenopausal Never Smoker Vitals History BMI and BSA Data Body Mass Index Body Surface Area  
 43.63 kg/m 2 2.02 m 2 Preferred Pharmacy Pharmacy Name Phone [facility] [address] [phone] Your Updated Medication List  
  
   
This list is accurate as of: 9/12/17  5:34 PM.  Always use your most recent med list.  
  
  
  
  
 albuterol 90 mcg/actuation inhaler Commonly known as:  PROVENTIL HFA, VENTOLIN HFA, PROAIR HFA Take 2 Puffs by inhalation every four (4) hours as needed for Wheezing. CRESTOR 5 mg tablet Generic drug:  rosuvastatin Take  by mouth nightly. desloratadine 5 mg tablet Commonly known as:  CLARINEX Take 1 Tab by mouth daily. FISH  mg Cap Generic drug:  Omega-3 Fatty Acids Take  by mouth. fluticasone-salmeterol 100-50 mcg/dose diskus inhaler Commonly known as:  ADVAIR Take 1 Puff by inhalation every twelve (12) hours. lisinopril-hydroCHLOROthiazide 10-12.5 mg per tablet Commonly known as:  Dalphine Brazen Take 1 Tab by mouth daily. mometasone 50 mcg/actuation nasal spray Commonly known as:  NASONEX  
2 Sprays by Both Nostrils route daily. omega-3 acid ethyl esters 1 gram capsule Commonly known as:  Sathish Bible Take 2 Caps by mouth two (2) times daily (with meals). Indications: HYPERTRIGLYCERIDEMIA  
  
 trimethoprim-sulfamethoxazole 160-800 mg per tablet Commonly known as:  BACTRIM DS Take 1 Tab by mouth two (2) times a day for 7 days. Prescriptions Sent to Pharmacy Refills  
 albuterol (PROVENTIL HFA, VENTOLIN HFA, PROAIR HFA) 90 mcg/actuation inhaler 3 Sig: Take 2 Puffs by inhalation every four (4) hours as needed for Wheezing. Class: Normal  
 Pharmacy: [place] [address]. Ph #: [phone] Route: Inhalation  
 fluticasone-salmeterol (ADVAIR) 100-50 mcg/dose diskus inhaler 3 Sig: Take 1 Puff by inhalation every twelve (12) hours. Class: Normal  
 Pharmacy: [place] [address]. Ph #: [phone] Route: Inhalation  
 lisinopril-hydroCHLOROthiazide (PRINZIDE, ZESTORETIC) 10-12.5 mg per tablet 5 Sig: Take 1 Tab by mouth daily. Class: Normal  
 Pharmacy: [place] [address]. Ph #: [phone] Route: Oral  
 mometasone (NASONEX) 50 mcg/actuation nasal spray 2 Si Sprays by Both Nostrils route daily. Class: Normal  
 Pharmacy: [place] [address]. Ph #: [phone] Route: Both Nostrils To-Do List   
 2017 Lab: METABOLIC PANEL, COMPREHENSIVE Around 09/13/2017 Lab:  LIPID PANEL Around 09/13/2017 Lab:  TSH 3RD GENERATION Introducing \[facility]\"" & HEALTH SERVICES! Adilene Hunter introduces Not iT patient portal. Now you can access parts of your medical record, email your doctor's office, and request medication refills online. 1. In your internet browser, go to [email or web address] 2. Click on the First Time User? Click Here link in the Sign In box. You will see the New Member Sign Up page. 3. Enter your Not iT Access Code exactly as it appears below. You will not need to use this code after youve completed the sign-up process. If you do not sign up before the expiration date, you must request a new code. · Not iT Access Code: [email or web address] Expires: 12/9/2017  1:01 PM 
 
4. Enter the last four digits of your Social Security Number (xxxx) and Date of Birth (mm/dd/yyyy) as indicated and click Submit. You will be taken to the next sign-up page. 5. Create a Not iT ID. This will be your Not iT login ID and cannot be changed, so think of one that is secure and easy to remember. 6. Create a Not iT password. You can change your password at any time. 7. Enter your Password Reset Question and Answer. This can be used at a later time if you forget your password. 8. Enter your e-mail address. You will receive e-mail notification when new information is available in [address]. 9. Click Sign Up. You can now view and download portions of your medical record. 10. Click the Download Summary menu link to download a portable copy of your medical information. If you have questions, please visit the Frequently Asked Questions section of the Not iT website. Remember, Not iT is NOT to be used for urgent needs. For medical emergencies, dial 911. Now available from your iPhone and Android! Please provide this summary of care documentation to your next provider. Your primary care clinician is listed as Mahendra Son. If you have any questions after today's visit, please call [phone].

## 2017-10-31 ENCOUNTER — HOSPITAL ENCOUNTER (EMERGENCY)
Age: 52
Discharge: HOME | End: 2017-10-31
Attending: EMERGENCY MEDICINE
Payer: COMMERCIAL

## 2017-10-31 VITALS
HEIGHT: 60 IN | WEIGHT: 216 LBS | SYSTOLIC BLOOD PRESSURE: 154 MMHG | TEMPERATURE: 98.2 F | BODY MASS INDEX: 42.41 KG/M2 | OXYGEN SATURATION: 100 % | RESPIRATION RATE: 16 BRPM | HEART RATE: 74 BPM | DIASTOLIC BLOOD PRESSURE: 88 MMHG

## 2017-10-31 DIAGNOSIS — N30.01 ACUTE CYSTITIS WITH HEMATURIA: ICD-10-CM

## 2017-10-31 DIAGNOSIS — M54.50 LOW BACK PAIN RADIATING TO RIGHT LOWER EXTREMITY: Primary | ICD-10-CM

## 2017-10-31 DIAGNOSIS — M79.604 LOW BACK PAIN RADIATING TO RIGHT LOWER EXTREMITY: Primary | ICD-10-CM

## 2017-10-31 LAB
APPEARANCE UR: ABNORMAL
BACTERIA URNS QL MICRO: ABNORMAL /HPF
BILIRUB UR QL: NEGATIVE
COLOR UR: YELLOW
EPITH CASTS URNS QL MICRO: ABNORMAL /LPF (ref 0–5)
GLUCOSE UR STRIP.AUTO-MCNC: NEGATIVE MG/DL
HGB UR QL STRIP: ABNORMAL
KETONES UR QL STRIP.AUTO: NEGATIVE MG/DL
LEUKOCYTE ESTERASE UR QL STRIP.AUTO: ABNORMAL
NITRITE UR QL STRIP.AUTO: NEGATIVE
PH UR STRIP: 7 [PH] (ref 5–8)
PROT UR STRIP-MCNC: ABNORMAL MG/DL
RBC #/AREA URNS HPF: ABNORMAL /HPF (ref 0–5)
SP GR UR REFRACTOMETRY: 1.02 (ref 1–1.03)
UROBILINOGEN UR QL STRIP.AUTO: 1 EU/DL (ref 0.2–1)
WBC URNS QL MICRO: ABNORMAL /HPF (ref 0–4)

## 2017-10-31 PROCEDURE — 99284 EMERGENCY DEPT VISIT MOD MDM: CPT

## 2017-10-31 PROCEDURE — 74011636637 HC RX REV CODE- 636/637: Performed by: NURSE PRACTITIONER

## 2017-10-31 PROCEDURE — 74011250637 HC RX REV CODE- 250/637: Performed by: NURSE PRACTITIONER

## 2017-10-31 PROCEDURE — 87086 URINE CULTURE/COLONY COUNT: CPT | Performed by: NURSE PRACTITIONER

## 2017-10-31 PROCEDURE — 81001 URINALYSIS AUTO W/SCOPE: CPT | Performed by: EMERGENCY MEDICINE

## 2017-10-31 RX ORDER — CIPROFLOXACIN 500 MG/1
500 TABLET ORAL 2 TIMES DAILY
Qty: 10 TAB | Refills: 0 | Status: SHIPPED | OUTPATIENT
Start: 2017-10-31 | End: 2017-11-05

## 2017-10-31 RX ORDER — HYDROCODONE BITARTRATE AND ACETAMINOPHEN 5; 325 MG/1; MG/1
1 TABLET ORAL
Qty: 12 TAB | Refills: 0 | Status: SHIPPED | OUTPATIENT
Start: 2017-10-31 | End: 2017-12-12 | Stop reason: ALTCHOICE

## 2017-10-31 RX ORDER — PREDNISONE 20 MG/1
60 TABLET ORAL
Status: COMPLETED | OUTPATIENT
Start: 2017-10-31 | End: 2017-10-31

## 2017-10-31 RX ORDER — CIPROFLOXACIN 500 MG/1
500 TABLET ORAL
Status: COMPLETED | OUTPATIENT
Start: 2017-10-31 | End: 2017-10-31

## 2017-10-31 RX ORDER — PREDNISONE 50 MG/1
50 TABLET ORAL DAILY
Qty: 5 TAB | Refills: 0 | Status: SHIPPED | OUTPATIENT
Start: 2017-11-01 | End: 2017-11-06

## 2017-10-31 RX ORDER — NAPROXEN 250 MG/1
500 TABLET ORAL
Status: COMPLETED | OUTPATIENT
Start: 2017-10-31 | End: 2017-10-31

## 2017-10-31 RX ORDER — NAPROXEN 500 MG/1
500 TABLET ORAL 2 TIMES DAILY WITH MEALS
Qty: 14 TAB | Refills: 0 | Status: SHIPPED | OUTPATIENT
Start: 2017-10-31 | End: 2017-11-07

## 2017-10-31 RX ORDER — HYDROCODONE BITARTRATE AND ACETAMINOPHEN 5; 325 MG/1; MG/1
1 TABLET ORAL
Status: COMPLETED | OUTPATIENT
Start: 2017-10-31 | End: 2017-10-31

## 2017-10-31 RX ADMIN — PREDNISONE 60 MG: 20 TABLET ORAL at 23:04

## 2017-10-31 RX ADMIN — CIPROFLOXACIN HYDROCHLORIDE 500 MG: 500 TABLET, FILM COATED ORAL at 23:04

## 2017-10-31 RX ADMIN — NAPROXEN 500 MG: 250 TABLET ORAL at 23:03

## 2017-10-31 RX ADMIN — HYDROCODONE BITARTRATE AND ACETAMINOPHEN 1 TABLET: 5; 325 TABLET ORAL at 23:04

## 2017-10-31 NOTE — LETTER
[address] [place] [address] [facility] 
[address] [facility] [address] [phone] Work/School Note Date: 10/31/2017 To Whom It May concern: 
 
Amanda Hernandez was seen and treated today in the emergency room by the following provider(s): 
Attending Provider: Milagro Gates MD. Amanda Hernandez may return to work on 11/3/17. Sincerely, 
 
 
 
 
Madison Velázquez

## 2017-11-01 NOTE — ED NOTES
No change in prior assessment. I have reviewed discharge instructions with the patient. The patient verbalized understanding. Ambulatory from ED. Patient armband removed and shredded. Family in the car to return pt home.

## 2017-11-01 NOTE — DISCHARGE INSTRUCTIONS
Back Pain: Care Instructions  Your Care Instructions    Back pain has many possible causes. It is often related to problems with muscles and ligaments of the back. It may also be related to problems with the nerves, discs, or bones of the back. Moving, lifting, standing, sitting, or sleeping in an awkward way can strain the back. Sometimes you don't notice the injury until later. Arthritis is another common cause of back pain. Although it may hurt a lot, back pain usually improves on its own within several weeks. Most people recover in 12 weeks or less. Using good home treatment and being careful not to stress your back can help you feel better sooner. Follow-up care is a key part of your treatment and safety. Be sure to make and go to all appointments, and call your doctor if you are having problems. It's also a good idea to know your test results and keep a list of the medicines you take. How can you care for yourself at home? · Sit or lie in positions that are most comfortable and reduce your pain. Try one of these positions when you lie down:  ¨ Lie on your back with your knees bent and supported by large pillows. ¨ Lie on the floor with your legs on the seat of a sofa or chair. Lara Gal on your side with your knees and hips bent and a pillow between your legs. ¨ Lie on your stomach if it does not make pain worse. · Do not sit up in bed, and avoid soft couches and twisted positions. Bed rest can help relieve pain at first, but it delays healing. Avoid bed rest after the first day of back pain. · Change positions every 30 minutes. If you must sit for long periods of time, take breaks from sitting. Get up and walk around, or lie in a comfortable position. · Try using a heating pad on a low or medium setting for 15 to 20 minutes every 2 or 3 hours. Try a warm shower in place of one session with the heating pad. · You can also try an ice pack for 10 to 15 minutes every 2 to 3 hours.  Put a thin cloth between the ice pack and your skin. · Take pain medicines exactly as directed. ¨ If the doctor gave you a prescription medicine for pain, take it as prescribed. ¨ If you are not taking a prescription pain medicine, ask your doctor if you can take an over-the-counter medicine. · Take short walks several times a day. You can start with 5 to 10 minutes, 3 or 4 times a day, and work up to longer walks. Walk on level surfaces and avoid hills and stairs until your back is better. · Return to work and other activities as soon as you can. Continued rest without activity is usually not good for your back. · To prevent future back pain, do exercises to stretch and strengthen your back and stomach. Learn how to use good posture, safe lifting techniques, and proper body mechanics. When should you call for help? Call your doctor now or seek immediate medical care if:  ? · You have new or worsening numbness in your legs. ? · You have new or worsening weakness in your legs. (This could make it hard to stand up.)   ? · You lose control of your bladder or bowels. ? Watch closely for changes in your health, and be sure to contact your doctor if:  ? · Your pain gets worse. ? · You are not getting better after 2 weeks. Where can you learn more? Go to http://yosef-steph.info/. Enter Q009 in the search box to learn more about \"Back Pain: Care Instructions. \"  Current as of: March 21, 2017  Content Version: 11.4  © 6961-9915 [employer]. Care instructions adapted under license by [employer] (which disclaims liability or warranty for this information). If you have questions about a medical condition or this instruction, always ask your healthcare professional. [place] [address] any warranty or liability for your use of this information. Learning About Relief for Back Pain  What is back tension and strain?     Back strain happens when you overstretch, or pull, a muscle in your back. You may hurt your back in an accident or when you exercise or lift something. Most back pain will get better with rest and time. You can take care of yourself at home to help your back heal.  What can you do first to relieve back pain? When you first feel back pain, try these steps:  · Walk. Take a short walk (10 to 20 minutes) on a level surface (no slopes, hills, or stairs) every 2 to 3 hours. Walk only distances you can manage without pain, especially leg pain. · Relax. Find a comfortable position for rest. Some people are comfortable on the floor or a medium-firm bed with a small pillow under their head and another under their knees. Some people prefer to lie on their side with a pillow between their knees. Don't stay in one position for too long. · Try heat or ice. Try using a heating pad on a low or medium setting, or take a warm shower, for 15 to 20 minutes every 2 to 3 hours. Or you can buy single-use heat wraps that last up to 8 hours. You can also try an ice pack for 10 to 15 minutes every 2 to 3 hours. You can use an ice pack or a bag of frozen vegetables wrapped in a thin towel. There is not strong evidence that either heat or ice will help, but you can try them to see if they help. You may also want to try switching between heat and cold. · Take pain medicine exactly as directed. ¨ If the doctor gave you a prescription medicine for pain, take it as prescribed. ¨ If you are not taking a prescription pain medicine, ask your doctor if you can take an over-the-counter medicine. What else can you do? · Stretch and exercise. Exercises that increase flexibility may relieve your pain and make it easier for your muscles to keep your spine in a good, neutral position. And don't forget to keep walking. · Do self-massage. You can use self-massage to unwind after work or school or to energize yourself in the morning. You can easily massage your feet, hands, or neck. Self-massage works best if you are in comfortable clothes and are sitting or lying in a comfortable position. Use oil or lotion to massage bare skin. · Reduce stress. Back pain can lead to a vicious [place]: Distress about the pain tenses the muscles in your back, which in turn causes more pain. Learn how to relax your mind and your muscles to lower your stress. Where can you learn more? Go to http://yosef-steph.info/. Enter N855 in the search box to learn more about \"Learning About Relief for Back Pain. \"  Current as of: 2017  Content Version: 11.4  © 6852-4776 [employer]. Care instructions adapted under license by [employer] (which disclaims liability or warranty for this information). If you have questions about a medical condition or this instruction, always ask your healthcare professional. [address] any warranty or liability for your use of this information. Apsalar Activation    Thank you for requesting access to Apsalar. Please follow the instructions below to securely access and download your online medical record. Apsalar allows you to send messages to your doctor, view your test results, renew your prescriptions, schedule appointments, and more. How Do I Sign Up? 1. In your internet browser, go to [email or web address]  2. Click on the First Time User? Click Here link in the Sign In box. You will be redirect to the New Member Sign Up page. 3. Enter your Apsalar Access Code exactly as it appears below. You will not need to use this code after youve completed the sign-up process. If you do not sign up before the expiration date, you must request a new code. Apsalar Access Code: [email or web address]  Expires: 2017  1:01 PM (This is the date your Apsalar access code will )    4. Enter the last four digits of your Social Security Number (xxxx) and Date of Birth (mm/dd/yyyy) as indicated and click Submit.  You will be taken to the next sign-up page. 5. Create a SwipeGoodt ID. This will be your AktiVax login ID and cannot be changed, so think of one that is secure and easy to remember. 6. Create a AktiVax password. You can change your password at any time. 7. Enter your Password Reset Question and Answer. This can be used at a later time if you forget your password. 8. Enter your e-mail address. You will receive e-mail notification when new information is available in [address]. 9. Click Sign Up. You can now view and download portions of your medical record. 10. Click the Download Summary menu link to download a portable copy of your medical information. Additional Information    If you have questions, please visit the Frequently Asked Questions section of the AktiVax website at [email or web address] Remember, AktiVax is NOT to be used for urgent needs. For medical emergencies, dial 911.

## 2017-11-01 NOTE — ED PROVIDER NOTES
HPI Comments: 10:22 PM   46 y.o. female presents to ED C/O back and leg pain. Patient has a HX of chronic back pain, GERD, HTN, uterine fibroids, myomectomy, tonsillectomy. Patient reports right lower back pain, extending to right leg x 4-5 days. Patient denies injury to back, patient reports pain feels similar to when she had sciatica on the left side a few months ago. Patient denies use of IV drugs, loss of bowel or bladder function, weakness or loss of sensation to legs. Patient reports right leg pain worsens when she stands to long. Patient denies abdominal pain, fever, CP, SOB, dysuria. Patient denies any other symptoms or complaints. The history is provided by the patient. History limited by: No language barrier. Past Medical History:   Diagnosis Date    Acute shoulder pain due to trauma 5/17/2016    Allergic rhinitis 11/9/2009    Asthma 2015    PFT confirmed    CAD (coronary artery disease)     hx mi-2013 ?  Carpal tunnel syndrome 11/9/2009    Chronic low back pain without sciatica 5/17/2016    Excessive use of nonsteroidal anti-inflammatory drug (NSAID) 5/17/2016    Fibroid, uterine 11/9/2009    GERD (gastroesophageal reflux disease) 11/9/2009    Heart failure ([address]     heart attack 3 years ago    HLD (hyperlipidemia) 11/9/2009    Hypertension     Hypertriglyceridemia 5/17/2016    Hypothyroidism 5/17/2016    Knee pain, right     SOB (shortness of breath)     Weight gain 5/17/2016       Past Surgical History:   Procedure Laterality Date    COLONOSCOPY N/A 6/1/2017    COLONOSCOPY with Polypectomy performed by Mark Kamara MD at [address] HX BREAST BIOPSY      benign, left    HX GYN      fibroids removed    HX MYOMECTOMY      open first, then embolization 2nd time.     HX OTHER SURGICAL      cyst removed    HX TONSILLECTOMY      NC COLONOSCOPY W/BIOPSY SINGLE/MULTIPLE N/A 06/01/2017    Dr. Basilio Riley         Family History:   Problem Relation Age of Onset  Stroke Father      mini strokes, not sure of number    Hypertension Father     Cancer Maternal Grandfather      not sure type       Social History     Social History    Marital status:      Spouse name: N/A    Number of children: N/A    Years of education: N/A     Occupational History    Not on file. Social History Main Topics    Smoking status: Never Smoker    Smokeless tobacco: Never Used    Alcohol use No    Drug use: No    Sexual activity: Yes     Partners: Male     Birth control/ protection: None     Other Topics Concern     Service No    Blood Transfusions No    Caffeine Concern No    Occupational Exposure No    Hobby Hazards No    Sleep Concern No    Stress Concern No    Weight Concern Yes    Special Diet No    Back Care Yes    Exercise No    Bike Helmet No    Seat Belt Yes     some times    Self-Exams Yes     Social History Narrative         ALLERGIES: Review of patient's allergies indicates no known allergies. Review of Systems   Constitutional: Negative for appetite change, chills, fatigue and fever. HENT: Negative for congestion, rhinorrhea and sore throat. Respiratory: Negative for cough, shortness of breath and wheezing. Cardiovascular: Negative for chest pain and leg swelling. Gastrointestinal: Negative for abdominal pain, constipation, diarrhea, nausea and vomiting. Genitourinary: Negative for dysuria and hematuria. Musculoskeletal: Positive for arthralgias, back pain and myalgias. Neurological: Negative for dizziness, syncope and headaches. Vitals:    10/31/17 2158   BP: 154/88   Pulse: 74   Resp: 16   Temp: 98.2 °F (36.8 °C)   SpO2: 100%   Weight: 98 kg (216 lb)   Height: 5' (1.524 m)            Physical Exam   Constitutional: She is oriented to person, place, and time. She appears well-developed and well-nourished. No distress. Obese female, appears mildly uncomfortable. HENT:   Head: Atraumatic.    Cardiovascular: Normal rate, regular rhythm, normal heart sounds and intact distal pulses. Pulmonary/Chest: Effort normal and breath sounds normal. No respiratory distress. She has no wheezes. She has no rales. Abdominal: Soft. Bowel sounds are normal. There is tenderness in the suprapubic area. There is no rigidity, no rebound, no guarding and no CVA tenderness. Musculoskeletal:        Lumbar back: She exhibits tenderness and pain. She exhibits normal pulse. Back:    Neurological: She is alert and oriented to person, place, and time. She exhibits normal muscle tone. Coordination normal.   Skin: Skin is warm and dry. No rash noted. She is not diaphoretic. No erythema. No pallor. Nursing note and vitals reviewed. MDM  Number of Diagnoses or Management Options  Acute cystitis with hematuria:   Low back pain radiating to right lower extremity:   Diagnosis management comments: Differential Diagnosis: Musculoskeletal pain, myofascial strain/sprain, muscle spasm, spondylolisthesis, spondylosis, DJD, OA, sciatica, aortic aneurysm, vertebral infection, renal colic, pyelonephritis, epidural abscess, epidural hematoma, herniated nucleus pulposis, malignancy    MDM  Plan: UA ordered in triage. No indication for emergent imagining, no deficits, or injury  Progress -   UA - large lueks and blood, WBC too numerous to count. Patient was treated for UTI 1 month ago, no culture completed, will order culture. 10:37 PM Patient informed of results. Patient previously treated for sciatica and reported that prednisone helped so will treat with same and pain medication for acute lower back pain. Patient given first dose of pain medication prior to discharge. Urine culture ordered. patient referred to PCP as needed. Patient educated to return to the ED for any new or worsening symptoms. Questions denied.         Amount and/or Complexity of Data Reviewed  Clinical lab tests: ordered and reviewed      ED Course Procedures        RESULTS:    No orders to display       Labs Reviewed   URINALYSIS W/ RFLX MICROSCOPIC - Abnormal; Notable for the following:        Result Value    Protein TRACE (*)     Blood LARGE (*)     Leukocyte Esterase LARGE (*)     All other components within normal limits   URINE MICROSCOPIC ONLY - Abnormal; Notable for the following:     Bacteria 2+ (*)     All other components within normal limits   CULTURE, URINE       Recent Results (from the past 12 hour(s))   URINALYSIS W/ RFLX MICROSCOPIC    Collection Time: 10/31/17 10:05 PM   Result Value Ref Range    Color YELLOW      Appearance CLOUDY      Specific gravity 1.019 1.005 - 1.030      pH (UA) 7.0 5.0 - 8.0      Protein TRACE (A) NEG mg/dL    Glucose NEGATIVE  NEG mg/dL    Ketone NEGATIVE  NEG mg/dL    Bilirubin NEGATIVE  NEG      Blood LARGE (A) NEG      Urobilinogen 1.0 0.2 - 1.0 EU/dL    Nitrites NEGATIVE  NEG      Leukocyte Esterase LARGE (A) NEG     URINE MICROSCOPIC ONLY    Collection Time: 10/31/17 10:05 PM   Result Value Ref Range    WBC TOO NUMEROUS TO COUNT 0 - 4 /hpf    RBC 11 to 20 0 - 5 /hpf    Epithelial cells 1+ 0 - 5 /lpf    Bacteria 2+ (A) NEG /hpf       PROGRESS NOTE:   10:22 PM   Initial assessment completed. Written by Charles PERKINS    DISCHARGE NOTE:  10:42 PM   Kellie Ceron's  results have been reviewed with her. She has been counseled regarding her diagnosis, treatment, and plan. She verbally conveys understanding and agreement of the signs, symptoms, diagnosis, treatment and prognosis and additionally agrees to follow up as discussed. She also agrees with the care-plan and conveys that all of her questions have been answered. I have also provided discharge instructions for her that include: educational information regarding their diagnosis and treatment, and list of reasons why they would want to return to the ED prior to their follow-up appointment, should her condition change.      CLINICAL IMPRESSION:    1. Low back pain radiating to right lower extremity    2. Acute cystitis with hematuria        AFTER VISIT PLAN:    Current Discharge Medication List      START taking these medications    Details   ciprofloxacin HCl (CIPRO) 500 mg tablet Take 1 Tab by mouth two (2) times a day for 5 days. Qty: 10 Tab, Refills: 0      predniSONE (DELTASONE) 50 mg tablet Take 1 Tab by mouth daily for 5 doses. Qty: 5 Tab, Refills: 0      HYDROcodone-acetaminophen (NORCO) 5-325 mg per tablet Take 1 Tab by mouth every six (6) hours as needed for Pain. Max Daily Amount: 4 Tabs. Qty: 12 Tab, Refills: 0      naproxen (NAPROSYN) 500 mg tablet Take 1 Tab by mouth two (2) times daily (with meals) for 7 days.   Qty: 14 Tab, Refills: 0              Follow-up Information     Follow up With Details Comments [address] [place] [address] [place] Schedule an appointment as soon as possible for a visit in 1 week As needed [address]             Written by Charles PERKINS

## 2017-11-02 LAB
BACTERIA SPEC CULT: NORMAL
SERVICE CMNT-IMP: NORMAL

## 2017-11-24 ENCOUNTER — TELEPHONE (OUTPATIENT)
Dept: FAMILY MEDICINE CLINIC | Age: 52
End: 2017-11-24

## 2017-11-24 NOTE — TELEPHONE ENCOUNTER
I called pt and left pt a voicemail to get pt scheduled at [facility] office or with new provider here.

## 2017-12-06 ENCOUNTER — HOSPITAL ENCOUNTER (OUTPATIENT)
Dept: LAB | Age: 52
Discharge: HOME | End: 2017-12-06

## 2017-12-06 DIAGNOSIS — R79.89 ELEVATED TSH: Primary | ICD-10-CM

## 2017-12-06 DIAGNOSIS — E78.1 HYPERTRIGLYCERIDEMIA: ICD-10-CM

## 2017-12-06 PROCEDURE — 99001 SPECIMEN HANDLING PT-LAB: CPT | Performed by: PHYSICIAN ASSISTANT

## 2017-12-06 NOTE — PROGRESS NOTES
Received call from lab stating that patient's orders for lipid panel and TSH have .  labs re-ordered

## 2017-12-07 LAB
ALBUMIN SERPL-MCNC: 3.8 G/DL (ref 3.5–5.5)
ALBUMIN/GLOB SERPL: 1.1 {RATIO} (ref 1.2–2.2)
ALP SERPL-CCNC: 106 IU/L (ref 39–117)
ALT SERPL-CCNC: 15 IU/L (ref 0–32)
AST SERPL-CCNC: 13 IU/L (ref 0–40)
BILIRUB SERPL-MCNC: <0.2 MG/DL (ref 0–1.2)
BUN SERPL-MCNC: 12 MG/DL (ref 6–24)
BUN/CREAT SERPL: 16 (ref 9–23)
CALCIUM SERPL-MCNC: 9 MG/DL (ref 8.7–10.2)
CHLORIDE SERPL-SCNC: 98 MMOL/L (ref 96–106)
CHOLEST SERPL-MCNC: 225 MG/DL (ref 100–199)
CO2 SERPL-SCNC: 26 MMOL/L (ref 18–29)
CREAT SERPL-MCNC: 0.73 MG/DL (ref 0.57–1)
GFR SERPLBLD CREATININE-BSD FMLA CKD-EPI: 110 ML/MIN/1.73
GFR SERPLBLD CREATININE-BSD FMLA CKD-EPI: 95 ML/MIN/1.73
GLOBULIN SER CALC-MCNC: 3.6 G/DL (ref 1.5–4.5)
GLUCOSE SERPL-MCNC: 95 MG/DL (ref 65–99)
HDLC SERPL-MCNC: 35 MG/DL
INTERPRETATION, 910389: NORMAL
LDLC SERPL CALC-MCNC: 154 MG/DL (ref 0–99)
POTASSIUM SERPL-SCNC: 4.3 MMOL/L (ref 3.5–5.2)
PROT SERPL-MCNC: 7.4 G/DL (ref 6–8.5)
SODIUM SERPL-SCNC: 140 MMOL/L (ref 134–144)
TRIGL SERPL-MCNC: 182 MG/DL (ref 0–149)
TSH SERPL DL<=0.005 MIU/L-ACNC: 2.37 UIU/ML (ref 0.45–4.5)
VLDLC SERPL CALC-MCNC: 36 MG/DL (ref 5–40)

## 2017-12-12 ENCOUNTER — OFFICE VISIT (OUTPATIENT)
Dept: FAMILY MEDICINE CLINIC | Age: 52
End: 2017-12-12

## 2017-12-12 VITALS
DIASTOLIC BLOOD PRESSURE: 84 MMHG | SYSTOLIC BLOOD PRESSURE: 122 MMHG | WEIGHT: 220 LBS | HEIGHT: 60 IN | OXYGEN SATURATION: 100 % | RESPIRATION RATE: 18 BRPM | HEART RATE: 77 BPM | BODY MASS INDEX: 43.19 KG/M2 | TEMPERATURE: 98.4 F

## 2017-12-12 DIAGNOSIS — I10 ESSENTIAL HYPERTENSION: Primary | ICD-10-CM

## 2017-12-12 DIAGNOSIS — M54.41 CHRONIC BILATERAL LOW BACK PAIN WITH RIGHT-SIDED SCIATICA: ICD-10-CM

## 2017-12-12 DIAGNOSIS — E78.2 MIXED HYPERLIPIDEMIA: ICD-10-CM

## 2017-12-12 DIAGNOSIS — Z12.39 SCREENING FOR BREAST CANCER: ICD-10-CM

## 2017-12-12 DIAGNOSIS — J45.30 MILD PERSISTENT ASTHMA WITHOUT COMPLICATION: ICD-10-CM

## 2017-12-12 DIAGNOSIS — G89.29 CHRONIC BILATERAL LOW BACK PAIN WITH RIGHT-SIDED SCIATICA: ICD-10-CM

## 2017-12-12 RX ORDER — CYCLOBENZAPRINE HCL 10 MG
TABLET ORAL
Qty: 15 TAB | Refills: 0 | Status: SHIPPED | OUTPATIENT
Start: 2017-12-12 | End: 2017-12-20

## 2017-12-12 RX ORDER — LISINOPRIL AND HYDROCHLOROTHIAZIDE 10; 12.5 MG/1; MG/1
1 TABLET ORAL DAILY
Qty: 30 TAB | Refills: 5 | Status: SHIPPED | OUTPATIENT
Start: 2017-12-12 | End: 2018-03-20 | Stop reason: SDUPTHER

## 2017-12-12 RX ORDER — GUAIFENESIN 100 MG/5ML
81 LIQUID (ML) ORAL DAILY
Qty: 30 TAB | Refills: 0
Start: 2017-12-12

## 2017-12-12 RX ORDER — NAPROXEN 500 MG/1
500 TABLET ORAL 2 TIMES DAILY WITH MEALS
Qty: 30 TAB | Refills: 0 | Status: SHIPPED | OUTPATIENT
Start: 2017-12-12 | End: 2017-12-27

## 2017-12-12 NOTE — MR AVS SNAPSHOT
Visit Information Date & Time Provider Department Dept. Phone Encounter #  
 12/12/2017  4:30 PM Devin Rodriguez, [address] [place] [address] Road [id number] Follow-up Instructions Return in about 3 months (around 3/12/2018) for HTN fasting labs prior to next appointment. Upcoming Health Maintenance Date Due Pneumococcal 19-64 Medium Risk (1 of 1 - PPSV23) 4/2/1984 DTaP/Tdap/Td series (1 - Tdap) 4/2/1986 BREAST CANCER SCRN MAMMOGRAM 1/18/2018 PAP AKA CERVICAL CYTOLOGY 1/13/2020 COLONOSCOPY 6/1/2027 Allergies as of 12/12/2017  Review Complete On: 12/12/2017 By: NIMA Moore No Known Allergies Current Immunizations  Never Reviewed No immunizations on file. Not reviewed this visit You Were Diagnosed With   
  
 Codes Comments Essential hypertension    -  Primary ICD-10-CM: I10 
ICD-9-CM: 401.9 Mixed hyperlipidemia     ICD-10-CM: E78.2 ICD-9-CM: 272.2 Mild persistent asthma without complication     NVG-46-PY: J45.30 ICD-9-CM: 493.90 BMI 40.0-44.9, adult [facility])     ICD-10-CM: Z68.41 
ICD-9-CM: V85.41 Chronic bilateral low back pain with right-sided sciatica     ICD-10-CM: M54.41, G89.29 ICD-9-CM: 724.2, 724.3, 338.29 Screening for breast cancer     ICD-10-CM: Z12.31 
ICD-9-CM: V76.10 Vitals BP Pulse Temp Resp Height(growth percentile) Weight(growth percentile) 122/84 77 98.4 °F (36.9 °C) (Oral) 18 5' (1.524 m) 220 lb (99.8 kg) LMP SpO2 BMI OB Status Smoking Status 06/01/2017 100% 42.97 kg/m2 Postmenopausal Never Smoker Vitals History BMI and BSA Data Body Mass Index Body Surface Area  
 42.97 kg/m 2 2.06 m 2 Preferred Pharmacy Pharmacy Name Phone [facility] [address] [phone] Your Updated Medication List  
  
   
This list is accurate as of: 12/12/17  5:29 PM.  Always use your most recent med list.  
 albuterol 90 mcg/actuation inhaler Commonly known as:  PROVENTIL HFA, VENTOLIN HFA, PROAIR HFA Take 2 Puffs by inhalation every four (4) hours as needed for Wheezing. aspirin 81 mg chewable tablet Take 1 Tab by mouth daily. CRESTOR 5 mg tablet Generic drug:  rosuvastatin Take  by mouth nightly. cyclobenzaprine 10 mg tablet Commonly known as:  FLEXERIL Take 1/2 to 1 tab nightly as needed for back pain  
  
 desloratadine 5 mg tablet Commonly known as:  CLARINEX Take 1 Tab by mouth daily. FISH  mg Cap Generic drug:  Omega-3 Fatty Acids Take  by mouth. fluticasone-salmeterol 100-50 mcg/dose diskus inhaler Commonly known as:  ADVAIR Take 1 Puff by inhalation every twelve (12) hours. lisinopril-hydroCHLOROthiazide 10-12.5 mg per tablet Commonly known as:  Bryon Iniguez Take 1 Tab by mouth daily. mometasone 50 mcg/actuation nasal spray Commonly known as:  NASONEX  
2 Sprays by Both Nostrils route daily. naproxen 500 mg tablet Commonly known as:  NAPROSYN Take 1 Tab by mouth two (2) times daily (with meals) for 15 days. omega-3 acid ethyl esters 1 gram capsule Commonly known as:  Maverick Tyson Take 2 Caps by mouth two (2) times daily (with meals). Indications: HYPERTRIGLYCERIDEMIA Prescriptions Sent to Pharmacy Refills  
 naproxen (NAPROSYN) 500 mg tablet 0 Sig: Take 1 Tab by mouth two (2) times daily (with meals) for 15 days. Class: Normal  
 Pharmacy: [place] [id number]. Ph #: [phone] Route: Oral  
 cyclobenzaprine (FLEXERIL) 10 mg tablet 0 Sig: Take 1/2 to 1 tab nightly as needed for back pain  
 Class: Normal  
 Pharmacy: [place] [address]. Ph #: [phone]  
 lisinopril-hydroCHLOROthiazide (PRINZIDE, ZESTORETIC) 10-12.5 mg per tablet 5 Sig: Take 1 Tab by mouth daily. Class: Normal  
 Pharmacy: [facility] [address]  #: [phone] Route: Oral  
  
We Performed the Following METABOLIC PANEL, COMPREHENSIVE [91516 CPT(R)] REFERRAL TO PHYSICAL THERAPY [NUG88 Custom] Follow-up Instructions Return in about 3 months (around 3/12/2018) for HTN fasting labs prior to next appointment. To-Do List   
 Around 12/13/2017 Lab:  LIPID PANEL   
  
 01/18/2018 Imaging:  [facility] MAMMO BI SCREENING INCL CAD Referral Information Referral ID Referred By Referred To  
  
 [id number] JEANETTE NUR [facility]   
   [address] [facility]   
   [address] [place] [address], [address] [place] [address],[address] Phone: [phone] Fax: [phone] Visits Status Start Date End Date 1 New Request 12/12/17 12/12/18 If your referral has a status of pending review or denied, additional information will be sent to support the outcome of this decision. Introducing [facility]! Brook Garcia introduces [employer]CH[employer] patient portal. Now you can access parts of your medical record, email your doctor's office, and request medication refills online. 1. In your internet browser, go to [email or web address] 2. Click on the First Time User? Click Here link in the Sign In box. You will see the New Member Sign Up page. 3. Enter your MicroCHIPS Access Code exactly as it appears below. You will not need to use this code after youve completed the sign-up process. If you do not sign up before the expiration date, you must request a new code. · MicroCHIPS Access Code: [email or web address] Expires: 3/3/2018  8:38 AM 
 
4. Enter the last four digits of your Social Security Number (xxxx) and Date of Birth (mm/dd/yyyy) as indicated and click Submit. You will be taken to the next sign-up page. 5. Create a MicroCHIPS ID.  This will be your MicroCHIPS login ID and cannot be changed, so think of one that is secure and easy to remember. 6. Create a Well[employer] password. You can change your password at any time. 7. Enter your Password Reset Question and Answer. This can be used at a later time if you forget your password. 8. Enter your e-mail address. You will receive e-mail notification when new information is available in [address] 9. Click Sign Up. You can now view and download portions of your medical record. 10. Click the Download Summary menu link to download a portable copy of your medical information. If you have questions, please visit the Frequently Asked Questions section of the Well[employer] website. Remember, Well[employer] is NOT to be used for urgent needs. For medical emergencies, dial 911. Now available from your iPhone and Android! Please provide this summary of care documentation to your next provider. Your primary care clinician is listed as Hanna Salguero. If you have any questions after today's visit, please call [phone].

## 2017-12-12 NOTE — PROGRESS NOTES
Patient: David Foreman MRN: [id number]  SSN: [id number]    YOB: 1965  Age: 46 y.o. Sex: female      Date of Service: 12/12/2017   Provider: NIMA Fitzgerald         REASON FOR VISIT:   Chief Complaint   Patient presents with    Hypertension    Asthma    Medication Refill     Lisnopril-HCTZ        VITALS:   Visit Vitals    /85    Pulse 77    Temp 98.4 °F (36.9 °C) (Oral)    Resp 18    Ht 5' (1.524 m)    Wt 220 lb (99.8 kg)    LMP 06/01/2017  Comment: patient had spotting tues and wed    SpO2 100%    BMI 42.97 kg/m2       MEDICATIONS:   Current Outpatient Prescriptions on File Prior to Visit   Medication Sig Dispense Refill    albuterol (PROVENTIL HFA, VENTOLIN HFA, PROAIR HFA) 90 mcg/actuation inhaler Take 2 Puffs by inhalation every four (4) hours as needed for Wheezing. 3 Inhaler 3    fluticasone-salmeterol (ADVAIR) 100-50 mcg/dose diskus inhaler Take 1 Puff by inhalation every twelve (12) hours. 3 Inhaler 3    lisinopril-hydroCHLOROthiazide (PRINZIDE, ZESTORETIC) 10-12.5 mg per tablet Take 1 Tab by mouth daily. 30 Tab 5    Omega-3 Fatty Acids (FISH OIL) 300 mg cap Take  by mouth.  desloratadine (CLARINEX) 5 mg tablet Take 1 Tab by mouth daily. 90 Tab 3    omega-3 acid ethyl esters (LOVAZA) 1 gram capsule Take 2 Caps by mouth two (2) times daily (with meals). Indications: HYPERTRIGLYCERIDEMIA 360 Cap 3    HYDROcodone-acetaminophen (NORCO) 5-325 mg per tablet Take 1 Tab by mouth every six (6) hours as needed for Pain. Max Daily Amount: 4 Tabs. 12 Tab 0    mometasone (NASONEX) 50 mcg/actuation nasal spray 2 Sprays by Both Nostrils route daily. 1 Container 2    rosuvastatin (CRESTOR) 5 mg tablet Take  by mouth nightly. No current facility-administered medications on file prior to visit.          ALLERGIES:   No Known Allergies     ACTIVE MEDICAL PROBLEMS:  Patient Active Problem List   Diagnosis Code    Allergic rhinitis J30.9    BMI 40.0-44.9, adult ([facility] [address] Z68.41    Hypertension I10    Elevated TSH R94.6    Asthma J45.909    Chronic low back pain without sciatica M54.5, G89.29    Mixed hyperlipidemia E78.2        MEDICAL/SURGICAL HISTORY:  Past Medical History:   Diagnosis Date    Acute shoulder pain due to trauma 5/17/2016    Allergic rhinitis 11/9/2009    Asthma 2015    PFT confirmed    CAD (coronary artery disease)     hx mi-2013 ?  Carpal tunnel syndrome 11/9/2009    Chronic low back pain without sciatica 5/17/2016    Excessive use of nonsteroidal anti-inflammatory drug (NSAID) 5/17/2016    Fibroid, uterine 11/9/2009    GERD (gastroesophageal reflux disease) 11/9/2009    Heart failure ([facility] [address]     heart attack 3 years ago    HLD (hyperlipidemia) 11/9/2009    Hypertension     Hypertriglyceridemia 5/17/2016    Hypothyroidism 5/17/2016    Knee pain, right     SOB (shortness of breath)     Weight gain 5/17/2016      Past Surgical History:   Procedure Laterality Date    COLONOSCOPY N/A 6/1/2017    COLONOSCOPY with Polypectomy performed by Michael Garcia MD at [address] HX BREAST BIOPSY      benign, left    HX GYN      fibroids removed    HX MYOMECTOMY      open first, then embolization 2nd time.  HX OTHER SURGICAL      cyst removed    HX TONSILLECTOMY      LA COLONOSCOPY W/BIOPSY SINGLE/MULTIPLE N/A 06/01/2017    Dr. Julio Wray:  Family History   Problem Relation Age of Onset    Stroke Father      mini strokes, not sure of number    Hypertension Father     Cancer Maternal Grandfather      not sure type        SOCIAL HISTORY:  Social History   Substance Use Topics    Smoking status: Never Smoker    Smokeless tobacco: Never Used    Alcohol use No           HISTORY OF PRESENT ILLNESS: Vidhya Weston is a 46 y.o. female who presents to the office for a routine follow up visit. Hypertension -   Currently, the patient's condition is well controlled.   BP elevated on intake but improved on manual re-check   Reports compliance with the following regimen: lisinopril-HCTZ 10-12.5 mg daily  Home BP readings: not checking. Lifestyle modifications: reports working on eating a healthier diet and losing weight. However noted to be up 4 lb since last visit. Hyperlipidemia -   In the setting of known CAD - patient had MI several years ago   Currently, the patient's condition is not at goal.   Recent lipid panel 12/6/17 revealed , HDL 35, trig 182   She is prescribed Crestor 5 mg daily, but admits she stopped taking this months ago because she read that it has been associated with Alzheimer's disease     Asthma -   Currently, the patient's condition is stable. Compliant with Advair BID and albuterol PRN  Frequency of rescue inhaler: once daily   Triggers: seasonal allergies, illness  Denies any nighttime awakenings or limitation of daily activity     Sciatica -   Patient complains of R sided low back pain radiating into R leg    Reports a history of sciatica in the past, but notes flare ups are happening more frequently as of late  She was in the ER on 10/31 with acute symptoms and was treated with naproxen, hydrocodone, and prednisone with good relief      REVIEW OF SYSTEMS:  Review of Systems   Constitutional: Negative for chills, fever and malaise/fatigue. Respiratory: Negative for cough, shortness of breath and wheezing. Cardiovascular: Negative for chest pain, palpitations and leg swelling. Gastrointestinal: Negative for abdominal pain, constipation, diarrhea, nausea and vomiting. Musculoskeletal: Positive for back pain. Neurological: Negative for dizziness and headaches. PHYSICAL EXAMINATION:  Physical Exam   Constitutional: She is oriented to person, place, and time and well-developed, well-nourished, and in no distress. Cardiovascular: Normal rate, regular rhythm, normal heart sounds and intact distal pulses. Exam reveals no gallop and no friction rub.     No murmur heard.  Pulmonary/Chest: Effort normal and breath sounds normal. She has no wheezes. She has no rales. Neurological: She is alert and oriented to person, place, and time. Gait normal.   Skin: Skin is warm and dry. No rash noted. Psychiatric: Memory and affect normal.        RESULTS:  Lab Results   Component Value Date/Time    Sodium 140 12/06/2017 12:00 AM    Potassium 4.3 12/06/2017 12:00 AM    Chloride 98 12/06/2017 12:00 AM    CO2 26 12/06/2017 12:00 AM    Anion gap 9 09/10/2017 11:38 AM    Glucose 95 12/06/2017 12:00 AM    BUN 12 12/06/2017 12:00 AM    Creatinine 0.73 12/06/2017 12:00 AM    BUN/Creatinine ratio 16 12/06/2017 12:00 AM    GFR est  12/06/2017 12:00 AM    GFR est non-AA 95 12/06/2017 12:00 AM    Calcium 9.0 12/06/2017 12:00 AM    Bilirubin, total <0.2 12/06/2017 12:00 AM    AST (SGOT) 13 12/06/2017 12:00 AM    Alk. phosphatase 106 12/06/2017 12:00 AM    Protein, total 7.4 12/06/2017 12:00 AM    Albumin 3.8 12/06/2017 12:00 AM    Globulin 5.4 09/10/2017 11:38 AM    A-G Ratio 1.1 12/06/2017 12:00 AM    ALT (SGPT) 15 12/06/2017 12:00 AM      Lab Results   Component Value Date/Time    TSH 2.370 12/06/2017 12:00 AM      Lab Results   Component Value Date/Time    Cholesterol, total 225 12/06/2017 12:00 AM    HDL Cholesterol 35 12/06/2017 12:00 AM    LDL, calculated 154 12/06/2017 12:00 AM    VLDL, calculated 36 12/06/2017 12:00 AM    Triglyceride 182 12/06/2017 12:00 AM    CHOL/HDL Ratio 5.4 01/13/2017 10:09 AM        ASSESSMENT/PLAN:  Diagnoses and all orders for this visit:    1. Essential hypertension  - Well controlled  - Meds refilled today  Orders:    -     lisinopril-hydroCHLOROthiazide (PRINZIDE, ZESTORETIC) 10-12.5 mg per tablet; Take 1 Tab by mouth daily. 2. Mixed hyperlipidemia  - Highly recommended statin therapy. Reviewed risks and benefits with patient.  Because of her history of MI, explained that I feel currently the benefits of statin therapy outweigh the risks  - Patient declines statin and would like to try intensive diet and exercise first   - Will plan to repeat labs prior to next visit, and if not significantly improved, she has agreed to resume statin  Orders:    -     METABOLIC PANEL, COMPREHENSIVE  -     LIPID PANEL; Future    3. Mild persistent asthma without complication  - Stable  - Continue current regimen     4. BMI 40.0-44.9, adult ([address]  - Encouraged weight loss through diet and exercise  - Patient states she, her , and her son all plan to start working on this together to keep each other motivated     5. Chronic bilateral low back pain with right-sided sciatica  - Short course of Naproxen and Flexeril as below  - Will refer to PT  Orders:    -     naproxen (NAPROSYN) 500 mg tablet; Take 1 Tab by mouth two (2) times daily (with meals) for 15 days. -     cyclobenzaprine (FLEXERIL) 10 mg tablet; Take 1/2 to 1 tab nightly as needed for back pain  -     [address]    6. Screening for breast cancer  - Mammo due in January. ordered today to be scheduled for after 1/18/18  Orders:    -     MAR[facility]LL[facility] MAMMO BI SCREENING INCL CAD; Future        Follow-up Disposition:  Return in about 3 months (around 3/12/2018) for HTN fasting labs prior to next appointment.      PATIENT CARE TEAM:   Patient Care Team:  NIMA Mars as PCP - 23 Cecilia Hobbs MD (General Surgery)       Jay Oneill, [place]   December 12, 2017    6:18 PM

## 2017-12-13 DIAGNOSIS — E78.2 MIXED HYPERLIPIDEMIA: ICD-10-CM

## 2017-12-15 ENCOUNTER — TELEPHONE (OUTPATIENT)
Dept: FAMILY MEDICINE CLINIC | Age: 52
End: 2017-12-15

## 2017-12-15 NOTE — TELEPHONE ENCOUNTER
I called pt and left pt a voicemail to let pt know that pt DMV forms have been completed and ready for  in our office. If pt has any questions pt can give our office a call back.

## 2018-01-02 ENCOUNTER — HOSPITAL ENCOUNTER (OUTPATIENT)
Dept: PHYSICAL THERAPY | Age: 53
Discharge: HOME | End: 2018-01-02
Payer: COMMERCIAL

## 2018-01-02 PROCEDURE — 97110 THERAPEUTIC EXERCISES: CPT | Performed by: PHYSICAL THERAPIST

## 2018-01-02 PROCEDURE — 97162 PT EVAL MOD COMPLEX 30 MIN: CPT | Performed by: PHYSICAL THERAPIST

## 2018-01-02 NOTE — PROGRESS NOTES
PT DAILY TREATMENT NOTE 12-16    Patient Name: Marley Antonio  Date:2018  : 1965  [x]  Patient  Verified  Payor: Luis Eduardo Alicia / Plan: Brayden Krishnamurthy / Product Type: HMO /    In time:4:30  Out time:5:20  Total Treatment Time (min): 50  Visit #: 1 of 8    Treatment Area: Low back pain [M54.5]    SUBJECTIVE  Pain Level (0-10 scale): 6-7  Any medication changes, allergies to medications, adverse drug reactions, diagnosis change, or new procedure performed?: [x] No    [] Yes (see summary sheet for update)  Subjective functional status/changes:   [] No changes reported  Please refer to evaluation. OBJECTIVE    20 min [x]Eval                  []Re-Eval       30 min Therapeutic Exercise:  [] See flow sheet :   Rationale: increase ROM, increase strength and increase proprioception to improve the patients ability to improve tolerance to functional tasks and reduce pain. With   [] TE   [] TA   [] neuro   [] other: Patient Education: [x] Review HEP    [] Progressed/Changed HEP based on:   [] positioning   [] body mechanics   [] transfers   [] heat/ice application    [] other:      Other Objective/Functional Measures: Please refer to evaluation. Pain Level (0-10 scale) post treatment: 6    ASSESSMENT/Changes in Function: Time spent on education for back anatomy, transverse abdominus function and impact on pain, body mechanics and seated/standing posture, and HEP. Pt is able to perform modified exercises per flow sheet and HEP this date. Patient will continue to benefit from skilled PT services to modify and progress therapeutic interventions, address functional mobility deficits, address ROM deficits, address strength deficits, analyze and address soft tissue restrictions, analyze and cue movement patterns, analyze and modify body mechanics/ergonomics and assess and modify postural abnormalities to attain remaining goals.      [x]  See Plan of Care  []  See progress note/recertification  []  See Discharge Summary         Progress towards goals / Updated goals:  Short Term Goals: To be accomplished in 1 weeks:  1. Pt to demonstrate [place] with HEP. Eval status: HEP issued and reviewed with pt  Long Term Goals: To be accomplished in 4 weeks:  1. Pt to report pain <4/10 to indicate improved tolerance to functional activities. Eval status: pt reports avg of 6-7/10 pain  2. Pt to report 72 on FOTO to indicate improved tolerance to functional activities and reduced pain in the LB. Eval status: 53  3. Pt to be able to walk and stand >15 minutes to improve functional mobility and reduce fall risk. Eval status: pt able to walk 2 minutes, stand less than 5 minutes  4. Pt to improve lumbar ROM to full ROM in all planes to improve functional mobility and reduce c/o pain. Eval status: pt currently 50% limited with pain in all planes of motion  5. Pt to demonstrate 5/5 strength in B LE with MMT to improve tolerance to functional tasks and reduce fall risk.    Eval status: pt grossly 4-/5 with MMT on R LE, 4/5 with MMT on L LE    PLAN  [x]  Upgrade activities as tolerated     [x]  Continue plan of care  []  Update interventions per flow sheet       []  Discharge due to:_  []  Other:_      Gudelia Campos, PT 1/2/2018  5:33 PM    Future Appointments  Date Time Provider Bharat Nath   1/5/2018 2:00 PM Ronny Denise [facility]VIN [facility]   3/13/2018 4:00 PM Haydee Munoz [address] Statement Selected

## 2018-01-02 NOTE — PROGRESS NOTES
[facility] [place] [address]  [address], [address] [phone] fax    Plan of Care/ Statement of Necessity for Physical Therapy Services           Patient name: Marie Hassan Start of Care: 2018   Referral source: NIMA Andersen : 1965    Medical Diagnosis: Low back pain [M54.5]   Onset Date:chronic    Treatment Diagnosis: LBP with R LE radiculopathy   Prior Hospitalization: see medical history Provider#: [id number]   Medications: Verified on Patient summary List    Comorbidities: Asthma, High BP   Prior Level of Function: Previously Independent with all ADLs/IADLs. 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 Pt lives with her , daughter, and son. The Plan of Care and following information is based on the information from the initial evaluation. Assessment/ key information: Pt is a 45 y/o female who presents today with chronic LBP and R LE radiculopathy that has been going on around 4 years with flares/reductions in symptoms. Pt notes that there was no specific cause for the onset of her symptoms. She notes that she is unable to stand, sit, or walk very long secondary to pain. She currently experiences muscle spasms in the upper and lower back if she maintains any position too long. She notes pain with bending forward. Pt notes reduced pain with lying on her side, and notes that she is able to sleep through the night with no c/o pain, but that there is pain with turning over or getting out of bed.   Evaluation Complexity History MEDIUM  Complexity : 1-2 comorbidities / personal factors will impact the outcome/ POC ; Examination MEDIUM Complexity : 3 Standardized tests and measures addressing body structure, function, activity limitation and / or participation in recreation  ;Presentation LOW Complexity : Stable, uncomplicated  ;Clinical Decision Making MEDIUM Complexity : FOTO score of 26-74  Overall Complexity Rating: MEDIUM  Problem List: pain affecting function, decrease ROM, decrease strength, impaired gait/ balance, decrease ADL/ functional abilitiies, decrease activity tolerance and decrease flexibility/ joint mobility   Treatment Plan may include any combination of the following: Therapeutic exercise, Therapeutic activities, Neuromuscular re-education, Physical agent/modality, Gait/balance training, Manual therapy, Patient education, Self Care training and Functional mobility training  Patient / Family readiness to learn indicated by: asking questions, trying to perform skills and interest  Persons(s) to be included in education: patient (P)  Barriers to Learning/Limitations: None  Patient Goal (s): Reduce pain  Patient Self Reported Health Status: fair  Rehabilitation Potential: good  Short Term Goals: To be accomplished in 1 weeks:  1. Pt to demonstrate [place] with HEP. Eval status: HEP issued and reviewed with pt  Long Term Goals: To be accomplished in 4 weeks:  1. Pt to report pain <4/10 to indicate improved tolerance to functional activities. Eval status: pt reports avg of 6-7/10 pain  2. Pt to report 72 on FOTO to indicate improved tolerance to functional activities and reduced pain in the LB. Eval status: 53  3. Pt to be able to walk and stand >15 minutes to improve functional mobility and reduce fall risk. Eval status: pt able to walk 2 minutes, stand less than 5 minutes  4. Pt to improve lumbar ROM to full ROM in all planes to improve functional mobility and reduce c/o pain. Eval status: pt currently 50% limited with pain in all planes of motion  5. Pt to demonstrate 5/5 strength in B LE with MMT to improve tolerance to functional tasks and reduce fall risk. Eval status: pt grossly 4-/5 with MMT on R LE, 4/5 with MMT on L LE    Frequency / Duration: Patient to be seen 2 times per week for 4 weeks.     Patient/ Caregiver education and instruction: Diagnosis, prognosis, self care, activity modification and exercises   [x]  Plan of care has been reviewed with PTA    Maribeth De Santiago, PT 1/2/2018 5:32 PM    ________________________________________________________________________    I certify that the above Therapy Services are being furnished while the patient is under my care. I agree with the treatment plan and certify that this therapy is necessary.     Physician's Signature:____________________  Date:____________Time: _________    Please sign and return to [facility] [address] [place], [address] [phone] fax

## 2018-01-09 ENCOUNTER — HOSPITAL ENCOUNTER (OUTPATIENT)
Dept: PHYSICAL THERAPY | Age: 53
Discharge: HOME | End: 2018-01-09
Payer: COMMERCIAL

## 2018-01-09 PROCEDURE — 97110 THERAPEUTIC EXERCISES: CPT | Performed by: PHYSICAL THERAPIST

## 2018-01-09 NOTE — PROGRESS NOTES
PT DAILY TREATMENT NOTE     Patient Name: Maria L Plaza  Date:2018  : 1965  [x]  Patient  Verified  Payor: Margaret Coates / Plan: Ankit Barrier / Product Type: HMO /    In time:505  Out time:537  Total Treatment Time (min): 32  Visit #: 2 of 8    Treatment Area: Low back pain [M54.5]    SUBJECTIVE  Pain Level (0-10 scale): 5  Any medication changes, allergies to medications, adverse drug reactions, diagnosis change, or new procedure performed?: [x] No    [] Yes (see summary sheet for update)  Subjective functional status/changes:   [] No changes reported  \"My back pain woke me up last night. I have felt more aware of my posture and have been fixing it when I slouch at work. I got a lumbar support, and have been using it. \"    OBJECTIVE    32 min Therapeutic Exercise:  [] See flow sheet :   Rationale: increase ROM, increase strength, improve coordination, improve balance and increase proprioception to improve the patients ability to complete daily tasks with reduced c/o pain. With   [] TE   [] TA   [] neuro   [] other: Patient Education: [x] Review HEP    [] Progressed/Changed HEP based on:   [] positioning   [] body mechanics   [] transfers   [] heat/ice application    [] other:      Other Objective/Functional Measures: Pt continues to demonstrate limited spinal ROM during tasks today. Pain Level (0-10 scale) post treatment: 5    ASSESSMENT/Changes in Function: Pt able to tolerate treatment without increased c/o pain today. She notes that several exercises are difficult and notes muscles working in the hamstrings and gluteus medius. PT provided education regarding TA activation and also gave pt a handout regarding the TA.     Patient will continue to benefit from skilled PT services to modify and progress therapeutic interventions, address functional mobility deficits, address ROM deficits, address strength deficits, analyze and address soft tissue restrictions, analyze and cue movement patterns, analyze and modify body mechanics/ergonomics, assess and modify postural abnormalities and address imbalance/dizziness to attain remaining goals. []  See Plan of Care  []  See progress note/recertification  []  See Discharge Summary         Progress towards goals / Updated goals:  Short Term Goals: To be accomplished in 1 weeks:  1. Pt to demonstrate [place] with HEP. Eval status: HEP issued and reviewed with pt    1/9/2018: Pt notes that she has been mindful of posture and is trying to use lumbar support at work. Long Term Goals: To be accomplished in 4 weeks:  1. Pt to report pain <4/10 to indicate improved tolerance to functional activities. Eval status: pt reports avg of 6-7/10 pain  2. Pt to report 72 on FOTO to indicate improved tolerance to functional activities and reduced pain in the LB. Eval status: 53  3. Pt to be able to walk and stand >15 minutes to improve functional mobility and reduce fall risk. Eval status: pt able to walk 2 minutes, stand less than 5 minutes  4. Pt to improve lumbar ROM to full ROM in all planes to improve functional mobility and reduce c/o pain. Eval status: pt currently 50% limited with pain in all planes of motion  5. Pt to demonstrate 5/5 strength in B LE with MMT to improve tolerance to functional tasks and reduce fall risk.                         Eval status: pt grossly 4-/5 with MMT on R LE, 4/5 with MMT on L LE    PLAN  []  Upgrade activities as tolerated     []  Continue plan of care  []  Update interventions per flow sheet       []  Discharge due to:_  []  Other:_      Alexy Factor, PT 1/9/2018  5:51 PM    Future Appointments  Date Time Provider Bharat Nath   3/13/2018 4:00 PM Haydee [address]

## 2018-01-17 ENCOUNTER — OFFICE VISIT (OUTPATIENT)
Dept: FAMILY MEDICINE CLINIC | Age: 53
End: 2018-01-17

## 2018-01-17 ENCOUNTER — HOSPITAL ENCOUNTER (OUTPATIENT)
Dept: GENERAL RADIOLOGY | Age: 53
Discharge: HOME | End: 2018-01-17
Payer: COMMERCIAL

## 2018-01-17 ENCOUNTER — TELEPHONE (OUTPATIENT)
Dept: FAMILY MEDICINE CLINIC | Age: 53
End: 2018-01-17

## 2018-01-17 VITALS
DIASTOLIC BLOOD PRESSURE: 87 MMHG | HEIGHT: 60 IN | WEIGHT: 225 LBS | TEMPERATURE: 98.3 F | RESPIRATION RATE: 16 BRPM | BODY MASS INDEX: 44.17 KG/M2 | HEART RATE: 64 BPM | SYSTOLIC BLOOD PRESSURE: 124 MMHG | OXYGEN SATURATION: 100 %

## 2018-01-17 DIAGNOSIS — M79.645 THUMB PAIN, LEFT: ICD-10-CM

## 2018-01-17 DIAGNOSIS — M79.645 THUMB PAIN, LEFT: Primary | ICD-10-CM

## 2018-01-17 DIAGNOSIS — H69.82 EUSTACHIAN TUBE DYSFUNCTION, LEFT: ICD-10-CM

## 2018-01-17 PROCEDURE — 73140 X-RAY EXAM OF FINGER(S): CPT

## 2018-01-17 RX ORDER — MOMETASONE FUROATE 50 UG/1
2 SPRAY, METERED NASAL DAILY
Qty: 1 CONTAINER | Refills: 0 | Status: SHIPPED | OUTPATIENT
Start: 2018-01-17

## 2018-01-17 NOTE — PROGRESS NOTES
Patient: Maegan Sexton MRN: [id number]  SSN: [id number]    YOB: 1965  Age: 46 y.o. Sex: female      Date of Service: 1/17/2018   Provider: NIMA Ervin   Office Location:   [address] [facility]  [address], [address]  Office Phone: [phone]  Office Fax: [phone]        REASON FOR VISIT:   Chief Complaint   Patient presents with    Hand Pain     left hand pain left thumb     Ear Pain     left ear        VITALS:   Visit Vitals    /87    Pulse 64    Temp 98.3 °F (36.8 °C) (Oral)    Resp 16    Ht 5' (1.524 m)    Wt 225 lb (102.1 kg)    LMP 06/01/2017  Comment: patient had spotting tues and wed    SpO2 100%    BMI 43.94 kg/m2       MEDICATIONS:   Current Outpatient Prescriptions   Medication Sig Dispense Refill    lisinopril-hydroCHLOROthiazide (PRINZIDE, ZESTORETIC) 10-12.5 mg per tablet Take 1 Tab by mouth daily. 30 Tab 5    aspirin 81 mg chewable tablet Take 1 Tab by mouth daily. 30 Tab 0    albuterol (PROVENTIL HFA, VENTOLIN HFA, PROAIR HFA) 90 mcg/actuation inhaler Take 2 Puffs by inhalation every four (4) hours as needed for Wheezing. 3 Inhaler 3    fluticasone-salmeterol (ADVAIR) 100-50 mcg/dose diskus inhaler Take 1 Puff by inhalation every twelve (12) hours. 3 Inhaler 3    mometasone (NASONEX) 50 mcg/actuation nasal spray 2 Sprays by Both Nostrils route daily. 1 Container 2    Omega-3 Fatty Acids (FISH OIL) 300 mg cap Take  by mouth.  desloratadine (CLARINEX) 5 mg tablet Take 1 Tab by mouth daily. 90 Tab 3    rosuvastatin (CRESTOR) 5 mg tablet Take  by mouth nightly.  omega-3 acid ethyl esters (LOVAZA) 1 gram capsule Take 2 Caps by mouth two (2) times daily (with meals).  Indications: HYPERTRIGLYCERIDEMIA 360 Cap 3        ALLERGIES:   No Known Allergies     ACTIVE MEDICAL PROBLEMS:  Patient Active Problem List   Diagnosis Code    Allergic rhinitis J30.9    BMI 40.0-44.9, adult ([facility] Z68.41    Hypertension I10    Elevated TSH R94.6    Asthma J45.909    Chronic low back pain without sciatica M54.5, G89.29    Mixed hyperlipidemia E78.2          HISTORY OF PRESENT ILLNESS:   Sarita Boogie is a 46 y.o. female who presents to the office for acute care. Patient complains of pain in her left thumb x 1 week. Pain seems to originate from the MCP joint, but she also notes some pain in the IP joint. Pain is exacerbated by any movement of her thumb, but especially opposition. She has tried applying warm compresses and taking Tylenol with minimal relief. She cannot recall any injury to her thumb or hand and has no recent change in activity. She is right handed. She denies any redness or swelling to her joints. Patient also complains of pain in her left ear x 2-3 days. Pain is described as dull. No change in hearing. Denies fevers, chills, tinnitus, dizziness/vertigo. No recent URI symptoms other than her baseline chronic nasal allergies. REVIEW OF SYSTEMS:  Review of Systems   Constitutional: Negative for chills, fever and malaise/fatigue. HENT: Positive for congestion and ear pain. Negative for ear discharge, hearing loss, sinus pain, sore throat and tinnitus. Eyes: Negative for discharge and redness. Respiratory: Negative for cough, shortness of breath and wheezing. Cardiovascular: Negative for chest pain and palpitations. Musculoskeletal: Positive for joint pain ( Left thumb). Neurological: Negative for dizziness, tingling, sensory change and headaches. PHYSICAL EXAMINATION:  Physical Exam   Constitutional: She is oriented to person, place, and time and well-developed, well-nourished, and in no distress. HENT:   Head: Normocephalic and atraumatic. Right Ear: Tympanic membrane and external ear normal. Tympanic membrane is not erythematous, not retracted and not bulging.    Left Ear: External ear normal. Tympanic membrane is not erythematous, not retracted and not bulging. A middle ear effusion is present. Nose: Mucosal edema and rhinorrhea present. Right sinus exhibits no maxillary sinus tenderness and no frontal sinus tenderness. Left sinus exhibits no maxillary sinus tenderness and no frontal sinus tenderness. Mouth/Throat: Uvula is midline, oropharynx is clear and moist and mucous membranes are normal.   canals with dry skin b/l   Eyes: Conjunctivae are normal. Pupils are equal, round, and reactive to light. Right eye exhibits no discharge. Left eye exhibits no discharge. Neck: Neck supple. Cardiovascular: Normal rate, regular rhythm and normal heart sounds. Exam reveals no gallop and no friction rub. No murmur heard. Pulmonary/Chest: Effort normal and breath sounds normal. No stridor. She has no wheezes. She has no rales. Musculoskeletal:   b/l hands without erythema, edema, or deformity. Full ROM b/l, but pain reproduced with active motion of the L thumb. Tender to palpation over the L IP and MCP joints. Wrist exam normal bilaterally   Lymphadenopathy:     She has no cervical adenopathy. Neurological: She is alert and oriented to person, place, and time. Gait normal.   Skin: Skin is warm and dry. No rash noted. Psychiatric: Mood, memory and affect normal.        RESULTS:  No results found for this visit on 01/17/18. ASSESSMENT/PLAN:  Diagnoses and all orders for this visit:    1. Thumb pain, left  - Suspect possible arthritis, will obtain x-ray for further evaluation  - Advised supportive care with rest and NSAIDs. Consider OTC bracing to immobilize the MCP joint  - Will follow up when x-ray results available  Orders:    -     XR THUMB LT MIN 2 V; Future    2.  Eustachian tube dysfunction, left  - Reassured no evidence of infection  - Suspect pain is secondary to eustachian tube dysfunction  - Resume nasonex as below  - RTO for any new/worsening symptoms  Orders:    -     mometasone (NASONEX) 50 mcg/actuation nasal spray; 2 Sprays by Both Nostrils route daily. Follow-up Disposition:  Return if symptoms worsen or fail to improve. follow up as scheduled.      PATIENT CARE TEAM:   Patient Care Team:  NIMA Grace as PCP - [facility] Manny Galarza MD (General Surgery)       NIMA Grace   January 17, 2018    4:05 PM   4:05 PM

## 2018-01-17 NOTE — TELEPHONE ENCOUNTER
[employer] faxed prior authorization request for mometasone (NASONEX) 50 mcg/actuation nasal spray. Please call insurance regarding prior authorization.    key. [email or web address]. [employer]  [id number]

## 2018-01-17 NOTE — PATIENT INSTRUCTIONS
Seasonal Allergies: Care Instructions  Your Care Instructions  Allergies occur when your body's defense system (immune system) overreacts to certain substances. The immune system treats a harmless substance as if it were a harmful germ or virus. Many things can cause this to happen. Examples include pollens, medicine, food, dust, animal dander, and mold. Your allergies are seasonal if you have symptoms just at certain times of the year. In that case, you are probably allergic to pollens from certain trees, grasses, or weeds. Allergies can be mild or severe. Over-the-counter allergy medicine may help with some symptoms. Read and follow all instructions on the label. Managing your allergies is an important part of staying healthy. Your doctor may suggest that you have tests to help find the cause of your allergies. When you know what things trigger your symptoms, you can avoid them. This can prevent allergy symptoms and other health problems. In some cases, immunotherapy might help. For this treatment, you get shots or use pills that have a small amount of certain allergens in them. Your body \"gets used to\" the allergen, so you react less to it over time. This kind of treatment may help prevent or reduce some allergy symptoms. Follow-up care is a key part of your treatment and safety. Be sure to make and go to all appointments, and call your doctor if you are having problems. It's also a good idea to know your test results and keep a list of the medicines you take. How can you care for yourself at home? · Be safe with medicines. Take your medicines exactly as prescribed. Call your doctor if you think you are having a problem with your medicine. · During your allergy season, keep windows closed. If you need to use air-conditioning, change or clean all filters every month. Take a shower and change your clothes after you have been outside. · Stay inside when pollen counts are high.  Vacuum once or twice a week. Use a vacuum  with a HEPA filter or a double-thickness filter. When should you call for help? Give an epinephrine shot if:  ? · You think you are having a severe allergic reaction. ? After giving an epinephrine shot, call 911, even if you feel better. ?Call 911 if:  ? · You have symptoms of a severe allergic reaction. These may include:  ¨ Sudden raised, red areas (hives) all over your body. ¨ Swelling of the throat, mouth, lips, or tongue. ¨ Trouble breathing. ¨ Passing out (losing consciousness). Or you may feel very lightheaded or suddenly feel weak, confused, or restless. ? · You have been given an epinephrine shot, even if you feel better. ?Call your doctor now or seek immediate medical care if:  ? · You have symptoms of an allergic reaction, such as:  ¨ A rash or hives (raised, red areas on the skin). ¨ Itching. ¨ Swelling. ¨ Belly pain, nausea, or vomiting. ? Watch closely for changes in your health, and be sure to contact your doctor if:  ? · You do not get better as expected. Where can you learn more? Go to http://yosef-steph.info/. Enter J912 in the search box to learn more about \"Seasonal Allergies: Care Instructions. \"  Current as of: September 29, 2016  Content Version: 11.4  © 4884-9401 [employer]. Care instructions adapted under license by [employer] (which disclaims liability or warranty for this information). If you have questions about a medical condition or this instruction, always ask your healthcare professional. [place] [address] any warranty or liability for your use of this information.

## 2018-01-17 NOTE — MR AVS SNAPSHOT
[address] [facility] [address] [facility] [address] 
[phone] Patient: Dario Mendoza MRN: [id number] NBO:[birth year] Visit Information Date & Time Provider Department Dept. Phone Encounter #  
 1/17/2018  4:00 PM Isabella Mccarthy, [address] [id number] Follow-up Instructions Return if symptoms worsen or fail to improve. Your Appointments 1/17/2018  4:00 PM  
FOLLOW UP EXAM with NIMA Mccoy [facility] Appt Note: left thumb pain [address] Critical access hospital [address] [facility] [address] [facility]  
  
    
 3/13/2018  4:00 PM  
ROUTINE CARE with NIMA Mccoy [facility] ([facility] Appt Note: 3mth f/u  
 [address] [facility]  
[address] [facility] [address] [facility] Upcoming Health Maintenance Date Due Pneumococcal 19-64 Medium Risk (1 of 1 - PPSV23) 4/2/1984 DTaP/Tdap/Td series (1 - Tdap) 4/2/1986 BREAST CANCER SCRN MAMMOGRAM 1/18/2018 PAP AKA CERVICAL CYTOLOGY 1/13/2020 COLONOSCOPY 6/1/2027 Allergies as of 1/17/2018  Review Complete On: 1/17/2018 By: NIMA Mccoy No Known Allergies Current Immunizations  Never Reviewed No immunizations on file. Not reviewed this visit You Were Diagnosed With   
  
 Codes Comments Thumb pain, left    -  Primary ICD-10-CM: J98.123 ICD-9-CM: 729.5 Eustachian tube dysfunction, left     ICD-10-CM: Z23.60 ICD-9-CM: 381.81 Vitals BP Pulse Temp Resp Height(growth percentile) Weight(growth percentile) 124/87 64 98.3 °F (36.8 °C) (Oral) 16 5' (1.524 m) 225 lb (102.1 kg) LMP SpO2 BMI OB Status Smoking Status 2017 100% 43.94 kg/m2 Postmenopausal Never Smoker Vitals History BMI and BSA Data Body Mass Index Body Surface Area 43.94 kg/m 2 2.08 m 2 Preferred Pharmacy Pharmacy Name Phone [address] [place] [address]. [phone] Your Updated Medication List  
  
   
This list is accurate as of: 18  3:56 PM.  Always use your most recent med list.  
  
  
  
  
 albuterol 90 mcg/actuation inhaler Commonly known as:  PROVENTIL HFA, VENTOLIN HFA, PROAIR HFA Take 2 Puffs by inhalation every four (4) hours as needed for Wheezing. aspirin 81 mg chewable tablet Take 1 Tab by mouth daily. CRESTOR 5 mg tablet Generic drug:  rosuvastatin Take  by mouth nightly. desloratadine 5 mg tablet Commonly known as:  CLARINEX Take 1 Tab by mouth daily. FISH  mg Cap Generic drug:  Omega-3 Fatty Acids Take  by mouth. fluticasone-salmeterol 100-50 mcg/dose diskus inhaler Commonly known as:  ADVAIR Take 1 Puff by inhalation every twelve (12) hours. lisinopril-hydroCHLOROthiazide 10-12.5 mg per tablet Commonly known as:  Morales Spearing Take 1 Tab by mouth daily. mometasone 50 mcg/actuation nasal spray Commonly known as:  NASONEX  
2 Sprays by Both Nostrils route daily. omega-3 acid ethyl esters 1 gram capsule Commonly known as:  Chacko Fuss Take 2 Caps by mouth two (2) times daily (with meals). Indications: HYPERTRIGLYCERIDEMIA Prescriptions Sent to Pharmacy Refills  
 mometasone (NASONEX) 50 mcg/actuation nasal spray 0 Si Sprays by Both Nostrils route daily. Class: Normal  
 Pharm[facility]: [address] [place] [address] [facility] Ph #: [phone] Route: Both Nostrils Follow-up Instructions Return if symptoms worsen or fail to improve. To-Do List   
 2018   Imaging:  XR THUMB LT MIN 2 V   
  
  
 Patient Instructions Seasonal Allergies: Care Instructions Your Care Instructions Allergies occur when your body's defense system (immune system) overreacts to certain substances. The immune system treats a harmless substance as if it were a harmful germ or virus. Many things can cause this to happen. Examples include pollens, medicine, food, dust, animal dander, and mold. Your allergies are seasonal if you have symptoms just at certain times of the year. In that case, you are probably allergic to pollens from certain trees, grasses, or weeds. Allergies can be mild or severe. Over-the-counter allergy medicine may help with some symptoms. Read and follow all instructions on the label. Managing your allergies is an important part of staying healthy. Your doctor may suggest that you have tests to help find the cause of your allergies. When you know what things trigger your symptoms, you can avoid them. This can prevent allergy symptoms and other health problems. In some cases, immunotherapy might help. For this treatment, you get shots or use pills that have a small amount of certain allergens in them. Your body \"gets used to\" the allergen, so you react less to it over time. This kind of treatment may help prevent or reduce some allergy symptoms. Follow-up care is a key part of your treatment and safety. Be sure to make and go to all appointments, and call your doctor if you are having problems. It's also a good idea to know your test results and keep a list of the medicines you take. How can you care for yourself at home? · Be safe with medicines. Take your medicines exactly as prescribed. Call your doctor if you think you are having a problem with your medicine. · During your allergy season, keep windows closed. If you need to use air-conditioning, change or clean all filters every month. Take a shower and change your clothes after you have been outside. · Stay inside when pollen counts are high. Vacuum once or twice a week. Use a vacuum  with a HEPA filter or a double-thickness filter. When should you call for help? Give an epinephrine shot if: 
? · You think you are having a severe allergic reaction. ? After giving an epinephrine shot, call 911, even if you feel better. ?Call 911 if: 
? · You have symptoms of a severe allergic reaction. These may include: 
¨ Sudden raised, red areas (hives) all over your body. ¨ Swelling of the throat, mouth, lips, or tongue. ¨ Trouble breathing. ¨ Passing out (losing consciousness). Or you may feel very lightheaded or suddenly feel weak, confused, or restless. ? · You have been given an epinephrine shot, even if you feel better. ?Call your doctor now or seek immediate medical care if: 
? · You have symptoms of an allergic reaction, such as: ¨ A rash or hives (raised, red areas on the skin). ¨ Itching. ¨ Swelling. ¨ Belly pain, nausea, or vomiting. ? Watch closely for changes in your health, and be sure to contact your doctor if: 
? · You do not get better as expected. Where can you learn more? Go to http://yosef-steph.info/. Enter J912 in the search box to learn more about \"Seasonal Allergies: Care Instructions. \" Current as of: September 29, 2016 Content Version: 11.4 © 1698-6244 [employer]. Care instructions adapted under license by [employer] (which disclaims liability or warranty for this information). If you have questions about a medical condition or this instruction, always ask your healthcare professional. [place] [address] any warranty or liability for your use of this information. Introducing [facility]! [address] introduces iZ3D patient portal. Now you can access parts of your medical record, email your doctor's office, and request medication refills online.    
 
1. In your internet browser, go to [email or web address] 2. Click on the First Time User? Click Here link in the Sign In box. You will see the New Member Sign Up page. 3. Enter your [employer] Access Code exactly as it appears below. You will not need to use this code after youve completed the sign-up process. If you do not sign up before the expiration date, you must request a new code. · [employer] Access Code: [email or web address] Expires: 3/3/2018  8:38 AM 
 
4. Enter the last four digits of your Social Security Number (xxxx) and Date of Birth (mm/dd/yyyy) as indicated and click Submit. You will be taken to the next sign-up page. 5. Create a [employer] ID. This will be your [employer] login ID and cannot be changed, so think of one that is secure and easy to remember. 6. Create a [employer] password. You can change your password at any time. 7. Enter your Password Reset Question and Answer. This can be used at a later time if you forget your password. 8. Enter your e-mail address. You will receive e-mail notification when new information is available in [address] 9. Click Sign Up. You can now view and download portions of your medical record. 10. Click the Download Summary menu link to download a portable copy of your medical information. If you have questions, please visit the Frequently Asked Questions section of the [employer] website. Remember, [employer] is NOT to be used for urgent needs. For medical emergencies, dial 911. Now available from your iPhone and Android! Please provide this summary of care documentation to your next provider. Your primary care clinician is listed as Isabella Mccarthy. If you have any questions after today's visit, please call [phone].

## 2018-01-18 ENCOUNTER — TELEPHONE (OUTPATIENT)
Dept: FAMILY MEDICINE CLINIC | Age: 53
End: 2018-01-18

## 2018-01-18 NOTE — TELEPHONE ENCOUNTER
I called pt to discuss pt results pt was not available at the time left a voicemail for pt to call our office back.

## 2018-01-18 NOTE — PROGRESS NOTES
Please let patient know that her thumb x-ray was normal. No significant arthritis. I would recommend using an OTC brace as we discussed for a few weeks, as well as some ibuprofen.  Return for follow up if pain persists or worsens

## 2018-02-09 ENCOUNTER — APPOINTMENT (OUTPATIENT)
Dept: GENERAL RADIOLOGY | Age: 53
End: 2018-02-09
Attending: PHYSICIAN ASSISTANT
Payer: COMMERCIAL

## 2018-02-09 ENCOUNTER — HOSPITAL ENCOUNTER (EMERGENCY)
Age: 53
Discharge: HOME | End: 2018-02-09
Attending: EMERGENCY MEDICINE
Payer: COMMERCIAL

## 2018-02-09 VITALS
OXYGEN SATURATION: 97 % | HEIGHT: 60 IN | SYSTOLIC BLOOD PRESSURE: 133 MMHG | TEMPERATURE: 99.6 F | DIASTOLIC BLOOD PRESSURE: 72 MMHG | WEIGHT: 225 LBS | BODY MASS INDEX: 44.17 KG/M2 | HEART RATE: 90 BPM | RESPIRATION RATE: 18 BRPM

## 2018-02-09 DIAGNOSIS — J18.9 COMMUNITY ACQUIRED PNEUMONIA, UNSPECIFIED LATERALITY: Primary | ICD-10-CM

## 2018-02-09 PROCEDURE — 99283 EMERGENCY DEPT VISIT LOW MDM: CPT

## 2018-02-09 PROCEDURE — 71046 X-RAY EXAM CHEST 2 VIEWS: CPT

## 2018-02-09 PROCEDURE — 87081 CULTURE SCREEN ONLY: CPT | Performed by: PHYSICIAN ASSISTANT

## 2018-02-09 PROCEDURE — 74011250637 HC RX REV CODE- 250/637: Performed by: PHYSICIAN ASSISTANT

## 2018-02-09 RX ORDER — AZITHROMYCIN 250 MG/1
TABLET, FILM COATED ORAL
Qty: 6 TAB | Refills: 0 | Status: SHIPPED | OUTPATIENT
Start: 2018-02-09 | End: 2018-02-13 | Stop reason: ALTCHOICE

## 2018-02-09 RX ORDER — ACETAMINOPHEN 500 MG
1000 TABLET ORAL
Status: COMPLETED | OUTPATIENT
Start: 2018-02-09 | End: 2018-02-09

## 2018-02-09 RX ORDER — CODEINE PHOSPHATE AND GUAIFENESIN 10; 100 MG/5ML; MG/5ML
SOLUTION ORAL
Qty: 120 ML | Refills: 0 | Status: SHIPPED | OUTPATIENT
Start: 2018-02-09 | End: 2018-03-20 | Stop reason: ALTCHOICE

## 2018-02-09 RX ADMIN — ACETAMINOPHEN 1000 MG: 500 TABLET ORAL at 16:25

## 2018-02-09 NOTE — LETTER
[address] [place] [address] [facility] 
[address] [facility] [address] [phone] Work/School Note Date: 2/9/2018 To Whom It May concern: 
 
Nessa Erickson was seen and treated today in the emergency room by the following provider(s): 
Attending Provider: Savage Kamara MD 
Physician Assistant: NIMA Braga. Nessa Erickson may return to work on 2/13/18. Sincerely, NIMA Braga

## 2018-02-09 NOTE — ED PROVIDER NOTES
EMERGENCY DEPARTMENT HISTORY AND PHYSICAL EXAM    Date: 2/9/2018  Patient Name: Maegan Sexton    History of Presenting Illness     Chief Complaint   Patient presents with    Nasal Congestion    Cough    Chills    Sore Throat         History Provided By: Patient    Chief Complaint: cough  Duration: 1 Weeks  Timing:  Acute  Location: n/a  Quality: n/a  Severity: Moderate  Modifying Factors: n/a  Associated Symptoms: sore throat, congestion      Additional History (Context): Maegan Sexton is a 46 y.o. female with hypertension, hyperlipidemia, stroke and asthma who presents with cough, congestion, sore throat x 1 week. Symptoms started with cough and have progressed to sore throat x yesterday. Has not taken any tylenol for the fever. Did take allegra which helped her symptoms. Denies N/V/D, abd pain, SOB, CP, wheezing or any other complaints. No known sick contacts. PCP: NIMA Ervin    Current Outpatient Prescriptions   Medication Sig Dispense Refill    azithromycin (ZITHROMAX Z-MILIND) 250 mg tablet Take two tablets today then one tablet daily 6 Tab 0    guaiFENesin-codeine (ROBITUSSIN AC) 100-10 mg/5 mL solution 1 teaspoon every 4 hours prn 120 mL 0    mometasone (NASONEX) 50 mcg/actuation nasal spray 2 Sprays by Both Nostrils route daily. 1 Container 0    lisinopril-hydroCHLOROthiazide (PRINZIDE, ZESTORETIC) 10-12.5 mg per tablet Take 1 Tab by mouth daily. 30 Tab 5    aspirin 81 mg chewable tablet Take 1 Tab by mouth daily. 30 Tab 0    albuterol (PROVENTIL HFA, VENTOLIN HFA, PROAIR HFA) 90 mcg/actuation inhaler Take 2 Puffs by inhalation every four (4) hours as needed for Wheezing. 3 Inhaler 3    fluticasone-salmeterol (ADVAIR) 100-50 mcg/dose diskus inhaler Take 1 Puff by inhalation every twelve (12) hours. 3 Inhaler 3    Omega-3 Fatty Acids (FISH OIL) 300 mg cap Take  by mouth.  rosuvastatin (CRESTOR) 5 mg tablet Take  by mouth nightly.       desloratadine (CLARINEX) 5 mg tablet Take 1 Tab by mouth daily. 90 Tab 3    omega-3 acid ethyl esters (LOVAZA) 1 gram capsule Take 2 Caps by mouth two (2) times daily (with meals). Indications: HYPERTRIGLYCERIDEMIA 360 Cap 3       Past History     Past Medical History:  Past Medical History:   Diagnosis Date    Acute shoulder pain due to trauma 5/17/2016    Allergic rhinitis 11/9/2009    Asthma 2015    PFT confirmed    CAD (coronary artery disease)     hx mi-2013 ?  Carpal tunnel syndrome 11/9/2009    Chronic low back pain without sciatica 5/17/2016    Excessive use of nonsteroidal anti-inflammatory drug (NSAID) 5/17/2016    Fibroid, uterine 11/9/2009    GERD (gastroesophageal reflux disease) 11/9/2009    Heart failure ([address]     heart attack 3 years ago    HLD (hyperlipidemia) 11/9/2009    Hypertension     Hypertriglyceridemia 5/17/2016    Hypothyroidism 5/17/2016    Knee pain, right     SOB (shortness of breath)     Weight gain 5/17/2016       Past Surgical History:  Past Surgical History:   Procedure Laterality Date    COLONOSCOPY N/A 6/1/2017    COLONOSCOPY with Polypectomy performed by Bibi Hein MD at [address] HX BREAST BIOPSY      benign, left    HX GYN      fibroids removed    HX MYOMECTOMY      open first, then embolization 2nd time.  HX OTHER SURGICAL      cyst removed    HX TONSILLECTOMY      [place] COLONOSCOPY W/BIOPSY SINGLE/MULTIPLE N/A 06/01/2017    Dr. Jose L Cameron       Family History:  Family History   Problem Relation Age of Onset    Stroke Father      mini strokes, not sure of number    Hypertension Father     Cancer Maternal Grandfather      not sure type       Social History:  Social History   Substance Use Topics    Smoking status: Never Smoker    Smokeless tobacco: Never Used    Alcohol use No       Allergies: Allergies   Allergen Reactions    Motrin [Ibuprofen] Other (comments)     Directed not to take by physician.           Review of Systems   Review of Systems   Constitutional: Positive for chills and fever. HENT: Positive for congestion and sore throat. Respiratory: Positive for cough. Negative for shortness of breath. Cardiovascular: Negative. Gastrointestinal: Negative. Genitourinary: Negative. Musculoskeletal: Negative for arthralgias and myalgias. Neurological: Negative. All other systems reviewed and are negative. All Other Systems Negative  Physical Exam     Vitals:    02/09/18 1621   BP: 133/72   Pulse: 94   Resp: 20   Temp: (!) 100.6 °F (38.1 °C)   SpO2: 97%   Weight: 102.1 kg (225 lb)   Height: 5' (1.524 m)     Physical Exam   Constitutional: She appears well-developed and well-nourished. No distress. HENT:   Head: Normocephalic and atraumatic. Right Ear: Hearing, tympanic membrane, external ear and ear canal normal.   Left Ear: Hearing, tympanic membrane, external ear and ear canal normal.   Nose: Nose normal.   Mouth/Throat: Uvula is midline. No oral lesions. No trismus in the jaw. Posterior oropharyngeal erythema present. No oropharyngeal exudate, posterior oropharyngeal edema or tonsillar abscesses. Eyes: Conjunctivae and EOM are normal. Pupils are equal, round, and reactive to light. Right eye exhibits no discharge. Left eye exhibits no discharge. Neck: Normal range of motion. Neck supple. Cardiovascular: Normal rate and regular rhythm. Pulmonary/Chest: Effort normal and breath sounds normal. She has no wheezes. Musculoskeletal: Normal range of motion. Lymphadenopathy:     She has no cervical adenopathy. Neurological: She is alert. Skin: Skin is warm and dry. She is not diaphoretic. Psychiatric: She has a normal mood and affect.          Diagnostic Study Results     Labs -     Recent Results (from the past 12 hour(s))   STREP THROAT SCREEN    Collection Time: 02/09/18  4:25 PM   Result Value Ref Range    Special Requests: NO SPECIAL REQUESTS      Strep Screen NEGATIVE       Culture result: PENDING        Radiologic Studies - XR CHEST PA LAT    (Results Pending)     CT Results  (Last 48 hours)    None        CXR Results  (Last 48 hours)    None            Medical Decision Making   I am the first provider for this patient. I reviewed the vital signs, available nursing notes, past medical history, past surgical history, family history and social history. Vital Signs-Reviewed the patient's vital signs. Pulse Oximetry Analysis - 97% on RA        Records Reviewed: Nursing Notes    Procedures:  Procedures    Provider Notes (Medical Decision Making):     MED RECONCILIATION:  No current facility-administered medications for this encounter. Current Outpatient Prescriptions   Medication Sig    azithromycin (ZITHROMAX Z-MILIND) 250 mg tablet Take two tablets today then one tablet daily    guaiFENesin-codeine (ROBITUSSIN AC) 100-10 mg/5 mL solution 1 teaspoon every 4 hours prn    mometasone (NASONEX) 50 mcg/actuation nasal spray 2 Sprays by Both Nostrils route daily.  lisinopril-hydroCHLOROthiazide (PRINZIDE, ZESTORETIC) 10-12.5 mg per tablet Take 1 Tab by mouth daily.  aspirin 81 mg chewable tablet Take 1 Tab by mouth daily.  albuterol (PROVENTIL HFA, VENTOLIN HFA, PROAIR HFA) 90 mcg/actuation inhaler Take 2 Puffs by inhalation every four (4) hours as needed for Wheezing.  fluticasone-salmeterol (ADVAIR) 100-50 mcg/dose diskus inhaler Take 1 Puff by inhalation every twelve (12) hours.  Omega-3 Fatty Acids (FISH OIL) 300 mg cap Take  by mouth.  rosuvastatin (CRESTOR) 5 mg tablet Take  by mouth nightly.  desloratadine (CLARINEX) 5 mg tablet Take 1 Tab by mouth daily.  omega-3 acid ethyl esters (LOVAZA) 1 gram capsule Take 2 Caps by mouth two (2) times daily (with meals). Indications: HYPERTRIGLYCERIDEMIA       Disposition:  discharged    DISCHARGE NOTE:     Pt has been reexamined. Feeling better. Patient has no new complaints, changes, or physical findings. Care plan outlined and precautions discussed. Results of xray and lab were reviewed with the patient. All medications were reviewed with the patient; will d/c home with zpack, robitussin AC. All of pt's questions and concerns were addressed. Patient was instructed and agrees to follow up with pcp, as well as to return to the ED upon further deterioration. Patient is ready to go home. Follow-up Information     Follow up With Details Comments [address] [facility] [address], [address] [facility] [address] [place] [address] Rd  [phone]            Current Discharge Medication List      START taking these medications    Details   azithromycin (ZITHROMAX Z-MILIND) 250 mg tablet Take two tablets today then one tablet daily  Qty: 6 Tab, Refills: 0      guaiFENesin-codeine (ROBITUSSIN AC) 100-10 mg/5 mL solution 1 teaspoon every 4 hours prn  Qty: 120 mL, Refills: 0    Associated Diagnoses: Community acquired pneumonia, unspecified laterality         CONTINUE these medications which have NOT CHANGED    Details   mometasone (NASONEX) 50 mcg/actuation nasal spray 2 Sprays by Both Nostrils route daily. Qty: 1 Container, Refills: 0    Associated Diagnoses: Eustachian tube dysfunction, left      lisinopril-hydroCHLOROthiazide (PRINZIDE, ZESTORETIC) 10-12.5 mg per tablet Take 1 Tab by mouth daily. Qty: 30 Tab, Refills: 5    Associated Diagnoses: Essential hypertension      aspirin 81 mg chewable tablet Take 1 Tab by mouth daily. Qty: 30 Tab, Refills: 0      albuterol (PROVENTIL HFA, VENTOLIN HFA, PROAIR HFA) 90 mcg/actuation inhaler Take 2 Puffs by inhalation every four (4) hours as needed for Wheezing. Qty: 3 Inhaler, Refills: 3    Associated Diagnoses: Mild persistent asthma without complication      fluticasone-salmeterol (ADVAIR) 100-50 mcg/dose diskus inhaler Take 1 Puff by inhalation every twelve (12) hours. Qty: 3 Inhaler, Refills: 3      Omega-3 Fatty Acids (FISH OIL) 300 mg cap Take  by mouth.       rosuvastatin (CRESTOR) 5 mg tablet Take  by mouth nightly. desloratadine (CLARINEX) 5 mg tablet Take 1 Tab by mouth daily. Qty: 90 Tab, Refills: 3    Associated Diagnoses: Seasonal allergic rhinitis due to pollen      omega-3 acid ethyl esters (LOVAZA) 1 gram capsule Take 2 Caps by mouth two (2) times daily (with meals). Indications: HYPERTRIGLYCERIDEMIA  Qty: 360 Cap, Refills: 3    Associated Diagnoses: Hypertriglyceridemia               Diagnosis     Clinical Impression:   1.  Community acquired pneumonia, unspecified laterality

## 2018-02-09 NOTE — DISCHARGE INSTRUCTIONS

## 2018-02-09 NOTE — ED TRIAGE NOTES
Patient states onset of nasal congestion last week. States onset of chills today. C/o developing scratchy throat yesterday. Denies taking Tylenol today. States that she is unable to take Motrin per PCP order.

## 2018-02-11 LAB
B-HEM STREP THROAT QL CULT: NEGATIVE
BACTERIA SPEC CULT: NORMAL
SERVICE CMNT-IMP: NORMAL

## 2018-02-13 ENCOUNTER — TELEPHONE (OUTPATIENT)
Dept: FAMILY MEDICINE CLINIC | Age: 53
End: 2018-02-13

## 2018-02-13 ENCOUNTER — OFFICE VISIT (OUTPATIENT)
Dept: FAMILY MEDICINE CLINIC | Age: 53
End: 2018-02-13

## 2018-02-13 VITALS
HEART RATE: 80 BPM | SYSTOLIC BLOOD PRESSURE: 140 MMHG | HEIGHT: 60 IN | BODY MASS INDEX: 42.41 KG/M2 | DIASTOLIC BLOOD PRESSURE: 89 MMHG | WEIGHT: 216 LBS | TEMPERATURE: 99.2 F | OXYGEN SATURATION: 100 % | RESPIRATION RATE: 18 BRPM

## 2018-02-13 DIAGNOSIS — R05.9 COUGH: Primary | ICD-10-CM

## 2018-02-13 DIAGNOSIS — B37.0 THRUSH, ORAL: ICD-10-CM

## 2018-02-13 DIAGNOSIS — R50.9 FEVER, UNSPECIFIED FEVER CAUSE: ICD-10-CM

## 2018-02-13 RX ORDER — NYSTATIN 100000 [USP'U]/ML
1 SUSPENSION ORAL 4 TIMES DAILY
Qty: 473 ML | Refills: 0 | Status: SHIPPED | OUTPATIENT
Start: 2018-02-13 | End: 2018-03-20 | Stop reason: ALTCHOICE

## 2018-02-13 RX ORDER — LEVOFLOXACIN 750 MG/1
750 TABLET ORAL DAILY
Qty: 7 TAB | Refills: 0 | Status: SHIPPED | OUTPATIENT
Start: 2018-02-13 | End: 2018-03-20 | Stop reason: ALTCHOICE

## 2018-02-13 NOTE — LETTER
NOTIFICATION RETURN TO WORK  
 
2/13/2018 3:01 PM 
 
Ms. Meg Mccoy [address] [facility] [address] To Whom It May Concern: 
 
Alberto Bhatt is currently under the care of [address] [facility]. She will return to work on: 2/20/18 If there are questions or concerns please have the patient contact our office.  
 
 
 
Sincerely, 
 
 
NIMA Siu

## 2018-02-13 NOTE — MR AVS SNAPSHOT
[address] [facility] [address] 
[phone] Patient: Bryan Kumar MRN: [id number]  Visit Information Date & Time Provider Department Dept. Phone Encounter #  
 2018  2:15 PM Irvin Elizondo, [address] [id number] Follow-up Instructions Return for Follow up as schduled. Your Appointments 3/13/2018  4:00 PM  
ROUTINE CARE with NIMA [facility] ([facility]) Appt Note: 3mth f/u  
 [address]  
[address] [facility] [address] [facility] [address] Upcoming Health Maintenance Date Due Pneumococcal 19-64 Medium Risk (1 of 1 - PPSV23) 1984 DTaP/Tdap/Td series (1 - Tdap) 1986 BREAST CANCER SCRN MAMMOGRAM 2018 PAP AKA CERVICAL CYTOLOGY 2020 COLONOSCOPY 2022 Allergies as of 2018  Review Complete On: 2018 By: Shahla Linn Severity Noted Reaction Type Reactions Motrin [Ibuprofen]  2018    Other (comments) Directed not to take by physician. Current Immunizations  Never Reviewed No immunizations on file. Not reviewed this visit You Were Diagnosed With   
  
 Codes Comments Cough    -  Primary ICD-10-CM: L13 ICD-9-CM: 786.2 Fever, unspecified fever cause     ICD-10-CM: R50.9 ICD-9-CM: 780.60 Thrush, oral     ICD-10-CM: B37.0 ICD-9-CM: 112.0 Vitals BP Pulse Temp Resp Height(growth percentile) Weight(growth percentile) 140/89 80 99.2 °F (37.3 °C) (Oral) 18 5' (1.524 m) 216 lb (98 kg) LMP SpO2 BMI OB Status Smoking Status 2017 100% 42.18 kg/m2 Postmenopausal Never Smoker Vitals History BMI and BSA Data Body Mass Index Body Surface Area  
 42.18 kg/m 2 2.04 m 2 Preferred Pharmacy Pharmacy Name Phone [facility] Zhang [address] [place] [address]. [phone] Your Updated Medication List  
  
   
This list is accurate as of: 2/13/18  3:06 PM.  Always use your most recent med list.  
  
  
  
  
 albuterol 90 mcg/actuation inhaler Commonly known as:  PROVENTIL HFA, VENTOLIN HFA, PROAIR HFA Take 2 Puffs by inhalation every four (4) hours as needed for Wheezing. aspirin 81 mg chewable tablet Take 1 Tab by mouth daily. CRESTOR 5 mg tablet Generic drug:  rosuvastatin Take  by mouth nightly. desloratadine 5 mg tablet Commonly known as:  CLARINEX Take 1 Tab by mouth daily. FISH  mg Cap Generic drug:  Omega-3 Fatty Acids Take  by mouth. fluticasone-salmeterol 100-50 mcg/dose diskus inhaler Commonly known as:  ADVAIR Take 1 Puff by inhalation every twelve (12) hours. guaiFENesin-codeine 100-10 mg/5 mL solution Commonly known as:  ROBITUSSIN AC  
1 teaspoon every 4 hours prn  
  
 levoFLOXacin 750 mg tablet Commonly known as:  Osman Newer Take 1 Tab by mouth daily. lisinopril-hydroCHLOROthiazide 10-12.5 mg per tablet Commonly known as:  Elyssa Scrape Take 1 Tab by mouth daily. mometasone 50 mcg/actuation nasal spray Commonly known as:  NASONEX  
2 Sprays by Both Nostrils route daily. nystatin 100,000 unit/mL suspension Commonly known as:  MYCOSTATIN Take 5 mL by mouth four (4) times daily. swish and spit  
  
 omega-3 acid ethyl esters 1 gram capsule Commonly known as:  Helene Arriola Take 2 Caps by mouth two (2) times daily (with meals). Indications: HYPERTRIGLYCERIDEMIA Prescriptions Sent to Pharmacy Refills  
 levoFLOXacin (LEVAQUIN) 750 mg tablet 0 Sig: Take 1 Tab by mouth daily. Class: Normal  
 Pharmacy: [address] Ph #: [phone]  Route: Oral  
 nystatin (MYCOSTATIN) 100,000 unit/mL suspension 0 Sig: Take 5 mL by mouth four (4) times daily. swish and spit Class: Normal  
 Pharmacy: [facility] [address] DAMIAN STALLWORTH [address]  #: [phone] Route: Oral  
  
Follow-up Instructions Return for Follow up as schduled. Introducing [facility] & HE[facility]! [facility] introduces [employer] patient portal. Now you can access parts of your medical record, email your doctor's office, and request medication refills online. 1. In your internet browser, go to [email or web address] 2. Click on the First Time User? Click Here link in the Sign In box. You will see the New Member Sign Up page. 3. Enter your [employer] Access Code exactly as it appears below. You will not need to use this code after youve completed the sign-up process. If you do not sign up before the expiration date, you must request a new code. · [employer] Access Code: [email or web address] Expires: 3/3/2018  8:38 AM 
 
4. Enter the last four digits of your Social Security Number (xxxx) and Date of Birth (mm/dd/yyyy) as indicated and click Submit. You will be taken to the next sign-up page. 5. Create a [employer] ID. This will be your [employer] login ID and cannot be changed, so think of one that is secure and easy to remember. 6. Create a [employer] password. You can change your password at any time. 7. Enter your Password Reset Question and Answer. This can be used at a later time if you forget your password. 8. Enter your e-mail address. You will receive e-mail notification when new information is available in [address] 9. Click Sign Up. You can now view and download portions of your medical record. 10. Click the Download Summary menu link to download a portable copy of your medical information. If you have questions, please visit the Frequently Asked Questions section of the [employer] website.  Remember, [employer] is NOT to be used for urgent needs. For medical emergencies, dial 911. Now available from your iPhone and Android! Please provide this summary of care documentation to your next provider. Your primary care clinician is listed as Shira Gordon. If you have any questions after today's visit, please call [phone].

## 2018-02-13 NOTE — TELEPHONE ENCOUNTER
Patient called this afternoon. She was seen this afternoon by NIMA Kessler. She states NIMA Smith was going to give her some mouthwash.  Please call patient back per request.

## 2018-02-13 NOTE — PROGRESS NOTES
Patient: Karlie Guevara MRN: [id number]  SSN: [id number]    YOB: 1965  Age: 46 y.o. Sex: female      Date of Service: 2/13/2018   Provider: NIMA Giordano   Office Location:   [address] Therese Juarez, [address] [place] [address] Dr Julien rose, [address] [facility] [address] [facility] [address]  Office Phone: [phone]  Office Fax: [phone]      REASON FOR VISIT:   Chief Complaint   Patient presents with    Follow-up     Pnuemonia pt states \" that pt is not feeling any better pt has finished Z-pac today  pt couldn't return to work today        VITALS:   Visit Vitals    /89    Pulse 80    Temp 99.2 °F (37.3 °C) (Oral)    Resp 18    Ht 5' (1.524 m)    Wt 216 lb (98 kg)    LMP 06/01/2017  Comment: patient had spotting tues and wed    SpO2 100%    BMI 42.18 kg/m2       MEDICATIONS:   Current Outpatient Prescriptions on File Prior to Visit   Medication Sig Dispense Refill    guaiFENesin-codeine (ROBITUSSIN AC) 100-10 mg/5 mL solution 1 teaspoon every 4 hours prn 120 mL 0    mometasone (NASONEX) 50 mcg/actuation nasal spray 2 Sprays by Both Nostrils route daily. 1 Container 0    lisinopril-hydroCHLOROthiazide (PRINZIDE, ZESTORETIC) 10-12.5 mg per tablet Take 1 Tab by mouth daily. 30 Tab 5    aspirin 81 mg chewable tablet Take 1 Tab by mouth daily. 30 Tab 0    albuterol (PROVENTIL HFA, VENTOLIN HFA, PROAIR HFA) 90 mcg/actuation inhaler Take 2 Puffs by inhalation every four (4) hours as needed for Wheezing. 3 Inhaler 3    fluticasone-salmeterol (ADVAIR) 100-50 mcg/dose diskus inhaler Take 1 Puff by inhalation every twelve (12) hours. 3 Inhaler 3    Omega-3 Fatty Acids (FISH OIL) 300 mg cap Take  by mouth.  omega-3 acid ethyl esters (LOVAZA) 1 gram capsule Take 2 Caps by mouth two (2) times daily (with meals).  Indications: HYPERTRIGLYCERIDEMIA 360 Cap 3    azithromycin (ZITHROMAX Z-MILIND) 250 mg tablet Take two tablets today then one tablet daily 6 Tab 0    rosuvastatin (CRESTOR) 5 mg tablet Take  by mouth nightly.  desloratadine (CLARINEX) 5 mg tablet Take 1 Tab by mouth daily. 90 Tab 3     No current facility-administered medications on file prior to visit. ALLERGIES:   Allergies   Allergen Reactions    Motrin [Ibuprofen] Other (comments)     Directed not to take by physician. ACTIVE MEDICAL PROBLEMS:  Patient Active Problem List   Diagnosis Code    Allergic rhinitis J30.9    BMI 40.0-44.9, adult ([facility] Z68.41    Hypertension I10    Elevated TSH R94.6    Asthma J45.909    Chronic low back pain without sciatica M54.5, G89.29    Mixed hyperlipidemia E78.2        MEDICAL/SURGICAL HISTORY:  Past Medical History:   Diagnosis Date    Acute shoulder pain due to trauma 5/17/2016    Allergic rhinitis 11/9/2009    Asthma 2015    PFT confirmed    CAD (coronary artery disease)     hx mi-2013 ?  Carpal tunnel syndrome 11/9/2009    Chronic low back pain without sciatica 5/17/2016    Excessive use of nonsteroidal anti-inflammatory drug (NSAID) 5/17/2016    Fibroid, uterine 11/9/2009    GERD (gastroesophageal reflux disease) 11/9/2009    Heart failure ([facility]     heart attack 3 years ago    HLD (hyperlipidemia) 11/9/2009    Hypertension     Hypertriglyceridemia 5/17/2016    Hypothyroidism 5/17/2016    Knee pain, right     SOB (shortness of breath)     Weight gain 5/17/2016      Past Surgical History:   Procedure Laterality Date    COLONOSCOPY N/A 6/1/2017    COLONOSCOPY with Polypectomy performed by Leopold Edward, MD at [address] [place] [address] HX BREAST BIOPSY      benign, left    HX GYN      fibroids removed    HX MYOMECTOMY      open first, then embolization 2nd time.     HX OTHER SURGICAL      cyst removed    HX TONSILLECTOMY      MD COLONOSCOPY W/BIOPSY SINGLE/MULTIPLE N/A 06/01/2017    Dr. Chato Shahid:  Family History   Problem Relation Age of Onset    Stroke Father      mini strokes, not sure of number    Hypertension Father     Cancer Maternal Grandfather      not sure type        SOCIAL HISTORY:  Social History   Substance Use Topics    Smoking status: Never Smoker    Smokeless tobacco: Never Used    Alcohol use No           HISTORY OF PRESENT ILLNESS: Khris Lucas is a 46 y.o. female who presents to the office for an ER follow up visit    Patient was seen in the ER on 2/9/18 with complaints of cough, congestion, and fever x 2-3 days. She was noted to have a low grade fever in the ER. Rapid strep was negative. Chest X-ray was within normal limits. No labs or rapid flu were done. Patient reports she was diagnosed with \"the early stages of pneumonia\" and discharge with rx for z pack and Robitussin cough syrup. Today, she reports she took her last dose of the Z pack this morning. She is not feeling any better. She does get some transient relief of cough with the Robitussin, but on the whole symptoms are unchanged since her initial presentation to the ED. Cough is mainly non-productive. She describes feeling tired weak, with intermittent chills and hot flashes. She does not have a thermometer at home and thus has not checked her temperature. Also admits to some b/l maxillary sinus pressure. REVIEW OF SYSTEMS:  Review of Systems   Constitutional: Positive for chills, fever and malaise/fatigue. HENT: Positive for congestion, sinus pain and sore throat. Negative for ear pain. Eyes: Negative for pain, discharge and redness. Respiratory: Positive for cough, hemoptysis ( patient coughed up some blood tinged mucus while in office. states this is the first it has happened) and sputum production. Negative for shortness of breath, wheezing and stridor. Cardiovascular: Negative for chest pain, palpitations and leg swelling. Gastrointestinal: Negative for diarrhea, nausea and vomiting. Skin: Negative for itching and rash. Neurological: Negative for dizziness and headaches.         PHYSICAL EXAMINATION:  Physical Exam   Constitutional: She is oriented to person, place, and time. HENT:   Head: Normocephalic and atraumatic. Right Ear: Tympanic membrane normal.   Left Ear: Tympanic membrane normal.   Nose: Mucosal edema present. No rhinorrhea. Right sinus exhibits maxillary sinus tenderness. Right sinus exhibits no frontal sinus tenderness. Left sinus exhibits maxillary sinus tenderness. Left sinus exhibits no frontal sinus tenderness. Mouth/Throat: Uvula is midline and oropharynx is clear and moist.   tongue with thick, white coating   Eyes: Conjunctivae are normal. Pupils are equal, round, and reactive to light. Right eye exhibits no discharge. Left eye exhibits no discharge. Neck: Neck supple. Cardiovascular: Normal rate, regular rhythm and normal heart sounds. Exam reveals no gallop and no friction rub. No murmur heard. Pulmonary/Chest: Effort normal and breath sounds normal. No stridor. No respiratory distress. She has no wheezes. She has no rales. Lymphadenopathy:     She has cervical adenopathy. Neurological: She is alert and oriented to person, place, and time. Gait normal.   Skin: Skin is warm and dry. No rash noted. Psychiatric: Mood, memory and affect normal.        RESULTS:    Results from Hospital Encounter encounter on 02/09/18   XR CHEST PA LAT   Narrative Chest PA and lateral views    CPT CODE: 46042    HISTORY:Nasal congestion, new onset last week     COMPARISON: 9/10/17    FINDINGS: The heart is normal in size. The lungs are clear. The bilateral  costophrenic angles are sharp. The mediastinum, pulmonary vascularity and bony  thorax appear unremarkable. Impression IMPRESSION:    No acute cardiopulmonary process. Thank you for your referral.           ASSESSMENT/PLAN:  Diagnoses and all orders for this visit:    1. Cough  2.  Fever, unspecified fever cause  - Suspect patient may have initially had the flu based on reported symptoms  - Reassured her that her CXR was clear in the ED, and that lung sounds are clear today  - Given lack of response to azithromycin, I will change her abx to Levaquin  - She was encouraged to return for re-eval and possibly repeat CXR if not starting to feel better by the end of this week  - back to ER for any significant worsening or new/worrisome symptoms  Orders:    -     levoFLOXacin (LEVAQUIN) 750 mg tablet; Take 1 Tab by mouth daily. 3. Thrush, oral  - White coating on tongue incidentally noted on exam today  - Patient denies steroid inhaler use other than her nasal spray  - Will send nystatin mouthwash as below  - Advised to schedule follow up if not improving  Orders:    -     nystatin (MYCOSTATIN) 100,000 unit/mL suspension; Take 5 mL by mouth four (4) times daily. swish and spit        Follow-up Disposition:  Return for Follow up as schduled.      PATIENT CARE TEAM:   Patient Care Team:  NIMA Carrasco as PCP - 23 Cecilia Chávez MD (General Surgery)       Manoj Bell, [address]   February 13, 2018    4:03 PM

## 2018-02-13 NOTE — TELEPHONE ENCOUNTER
I called and spoke with pt to let pt know that medication Nystatin was sent to pharmacy for pt. Pt verbalized understanding.

## 2018-03-01 NOTE — PROGRESS NOTES
[facility] [address]  [address] [facility] [address], [address] [phone] fax    Discharge Summary  Patient name: [place]fin Start of Care: 2018   Referral source: NIMA August : 1965                          Medical Diagnosis: Low back pain [M54.5] Onset Date:chronic                          Treatment Diagnosis: LBP with R LE radiculopathy   Prior Hospitalization: see medical history Provider#: [id number]   Medications: Verified on Patient summary List    Comorbidities: Asthma, High BP   Prior Level of Function: Previously Independent with all ADLs/IADLs. 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 Pt lives with her , daughter, and son. Visits from Start of Care: 2    Missed Visits: 0  Reporting Period : 2018 to 2018    Assessment/Summary of care:   Pt was seen for 2 therapy sessions. Per telephone log, the pt did not contact the clinic to schedule more follow up therapy appointments. Pt is therefore d/c'ed from therapy and unable to reassess goals at this time.      RECOMMENDATIONS:  [x]Discontinue therapy: []Patient has reached or is progressing toward set goals      [x]Patient is non-compliant or has abdicated      []Due to lack of appreciable progress towards set goals    Neha Montejo PT 3/1/2018 12:34 PM

## 2018-03-11 ENCOUNTER — HOSPITAL ENCOUNTER (EMERGENCY)
Age: 53
Discharge: HOME | End: 2018-03-11
Attending: EMERGENCY MEDICINE | Admitting: EMERGENCY MEDICINE
Payer: COMMERCIAL

## 2018-03-11 VITALS
HEIGHT: 60 IN | RESPIRATION RATE: 16 BRPM | WEIGHT: 218 LBS | TEMPERATURE: 98.9 F | BODY MASS INDEX: 42.8 KG/M2 | HEART RATE: 79 BPM | OXYGEN SATURATION: 99 %

## 2018-03-11 DIAGNOSIS — M54.31 BILATERAL SCIATICA: Primary | ICD-10-CM

## 2018-03-11 DIAGNOSIS — M54.32 BILATERAL SCIATICA: Primary | ICD-10-CM

## 2018-03-11 PROCEDURE — 99282 EMERGENCY DEPT VISIT SF MDM: CPT

## 2018-03-11 RX ORDER — CYCLOBENZAPRINE HCL 5 MG
5-10 TABLET ORAL
Qty: 22 TAB | Refills: 0 | Status: SHIPPED | OUTPATIENT
Start: 2018-03-11 | End: 2018-03-18

## 2018-03-11 RX ORDER — OXYCODONE AND ACETAMINOPHEN 5; 325 MG/1; MG/1
1-2 TABLET ORAL
Qty: 16 TAB | Refills: 0 | Status: SHIPPED | OUTPATIENT
Start: 2018-03-11 | End: 2018-03-20 | Stop reason: ALTCHOICE

## 2018-03-11 NOTE — LETTER
[address] [place] [address] Dr [facility] 
[address] [facility] [address] [phone] Work/School Note Date: 3/11/2018 To Whom It May concern: 
 
Carina Bermudez was seen and treated today in the emergency room by the following provider(s): 
Attending Provider: Leydi Reed MD. Carina Bermudez Return to work on 3-13-18.   
 
Sincerely, 
 
 
 
 
Leydi Reed MD

## 2018-03-12 NOTE — ED TRIAGE NOTES
Patient reports to ED with complaints low back pain radiating down left leg.  Patient reports history of sciatica

## 2018-03-12 NOTE — ED PROVIDER NOTES
EMERGENCY DEPARTMENT HISTORY AND PHYSICAL EXAM    10:15 PM      Date: 3/11/2018  Patient Name: Karlie Guevara    History of Presenting Illness     Chief Complaint   Patient presents with    Back Pain         History Provided By: Patient    Chief Complaint: Back Pain   Duration: \"this morning\" Hours  Timing:  Acute  Location: Lower back   Quality: Aching  Severity: Moderate  Modifying Factors: No pain medication was tried  Associated Symptoms: left leg pain       Additional History (Context): Karlie Guevara is a 46 y.o. female with PMHx of chronic back pain, HLD, GERD, HTN, SOB,  who presents c/o an acute onset of aching moderate lower back pain that started \"this morning\". Pt states she was doing jumping jacks recently for weight loss which may have triggered the pain. No pain medication was tried. The pain radiates down her left leg. But no leg pain without flexing at hip, no leg pain to touch. No weakness/numbness. No urinary/bowel changes. She has hx of chronic back pain and admits the last episode was x 2 months ago. No chance of pregnancy. NKDA. Denies fever, chills, weakness or numbness in legs, cp, abd pain, dysuria. Denies any further complaints or symptoms at the moment. PCP: NIMA Giordano    Current Outpatient Prescriptions   Medication Sig Dispense Refill    cyclobenzaprine (FLEXERIL) 5 mg tablet Take 1-2 Tabs by mouth three (3) times daily as needed for Muscle Spasm(s) for up to 7 days. 22 Tab 0    oxyCODONE-acetaminophen (PERCOCET) 5-325 mg per tablet Take 1-2 Tabs by mouth every six (6) hours as needed for Pain. Max Daily Amount: 8 Tabs. 16 Tab 0    levoFLOXacin (LEVAQUIN) 750 mg tablet Take 1 Tab by mouth daily. 7 Tab 0    nystatin (MYCOSTATIN) 100,000 unit/mL suspension Take 5 mL by mouth four (4) times daily.  swish and spit 473 mL 0    guaiFENesin-codeine (ROBITUSSIN AC) 100-10 mg/5 mL solution 1 teaspoon every 4 hours prn 120 mL 0    mometasone (NASONEX) 50 mcg/actuation nasal spray 2 Sprays by Both Nostrils route daily. 1 Container 0    lisinopril-hydroCHLOROthiazide (PRINZIDE, ZESTORETIC) 10-12.5 mg per tablet Take 1 Tab by mouth daily. 30 Tab 5    aspirin 81 mg chewable tablet Take 1 Tab by mouth daily. 30 Tab 0    albuterol (PROVENTIL HFA, VENTOLIN HFA, PROAIR HFA) 90 mcg/actuation inhaler Take 2 Puffs by inhalation every four (4) hours as needed for Wheezing. 3 Inhaler 3    fluticasone-salmeterol (ADVAIR) 100-50 mcg/dose diskus inhaler Take 1 Puff by inhalation every twelve (12) hours. 3 Inhaler 3    Omega-3 Fatty Acids (FISH OIL) 300 mg cap Take  by mouth.  rosuvastatin (CRESTOR) 5 mg tablet Take  by mouth nightly.  desloratadine (CLARINEX) 5 mg tablet Take 1 Tab by mouth daily. 90 Tab 3    omega-3 acid ethyl esters (LOVAZA) 1 gram capsule Take 2 Caps by mouth two (2) times daily (with meals). Indications: HYPERTRIGLYCERIDEMIA 360 Cap 3       Past History     Past Medical History:  Past Medical History:   Diagnosis Date    Acute shoulder pain due to trauma 5/17/2016    Allergic rhinitis 11/9/2009    Asthma 2015    PFT confirmed    CAD (coronary artery disease)     hx mi-2013 ?     Carpal tunnel syndrome 11/9/2009    Chronic low back pain without sciatica 5/17/2016    Excessive use of nonsteroidal anti-inflammatory drug (NSAID) 5/17/2016    Fibroid, uterine 11/9/2009    GERD (gastroesophageal reflux disease) 11/9/2009    Heart failure ([address]     heart attack 3 years ago    HLD (hyperlipidemia) 11/9/2009    Hypertension     Hypertriglyceridemia 5/17/2016    Hypothyroidism 5/17/2016    Knee pain, right     SOB (shortness of breath)     Weight gain 5/17/2016       Past Surgical History:  Past Surgical History:   Procedure Laterality Date    COLONOSCOPY N/A 6/1/2017    COLONOSCOPY with Polypectomy performed by Dorothy Green MD at [address] HX BREAST BIOPSY      benign, left    HX GYN      fibroids removed    HX MYOMECTOMY open first, then embolization 2nd time.  HX OTHER SURGICAL      cyst removed    HX TONSILLECTOMY      HI COLONOSCOPY W/BIOPSY SINGLE/MULTIPLE N/A 06/01/2017    Dr. Winsome Carlson       Family History:  Family History   Problem Relation Age of Onset    Stroke Father      mini strokes, not sure of number    Hypertension Father     Cancer Maternal Grandfather      not sure type       Social History:  Social History   Substance Use Topics    Smoking status: Never Smoker    Smokeless tobacco: Never Used    Alcohol use No       Allergies: Allergies   Allergen Reactions    Motrin [Ibuprofen] Other (comments)     Directed not to take by physician. Review of Systems       Review of Systems   Constitutional: Negative for chills and fever. HENT: Negative for congestion. Respiratory: Negative for cough and shortness of breath. Cardiovascular: Negative for chest pain. Gastrointestinal: Negative for abdominal pain and vomiting. Genitourinary: Negative for dysuria. Musculoskeletal: Positive for back pain. Positive for leg pain    Skin: Negative for rash. Neurological: Negative for weakness, light-headedness and numbness. All other systems reviewed and are negative. Physical Exam     Visit Vitals    Pulse 79    Temp 98.9 °F (37.2 °C)    Resp 16    Ht 5' (1.524 m)    Wt 98.9 kg (218 lb)    LMP 06/01/2017  Comment: patient had spotting tues and wed    SpO2 99%    BMI 42.58 kg/m2         Physical Exam   Constitutional: She is oriented to person, place, and time. She appears well-developed. HENT:   Head: Normocephalic. Mouth/Throat: Oropharynx is clear and moist.   Eyes: Pupils are equal, round, and reactive to light. Neck: Normal range of motion. Cardiovascular: Normal rate and normal heart sounds. No murmur heard. Pulmonary/Chest: Effort normal. She has no wheezes. She has no rales. Abdominal: Soft. There is no tenderness.    Musculoskeletal: Normal range of motion. She exhibits no edema. Positive for bilateral para lumbar tenderness and spasms    Neurological: She is alert and oriented to person, place, and time. Skin: Skin is warm and dry. Nursing note and vitals reviewed. Diagnostic Study Results     Vitals:  No data found. Medications ordered:   Medications - No data to display      Lab findings:  No results found for this or any previous visit (from the past 12 hour(s)). X-Ray, CT or other radiology findings or impressions:  No orders to display       Progress notes, Consult notes or additional Procedure notes:   Nvi. Af, nl vitals. Chronic recurrent sx's, no change. eval not c/w cauda equina/fx/cord compression/abscess/acute abd. No emc. Stable for dc and close f/u. No ind for imaging at this time. Det ret inst given. Disposition:  Diagnosis:   1. Bilateral sciatica        Disposition: home    Follow-up Information     Follow up With Details Comments [address] Schedule an appointment as soon as possible for a visit in 2 days  [address] [place] [address] Drive  [facility]  [phone]           Discharge Medication List as of 3/11/2018 10:19 PM      START taking these medications    Details   cyclobenzaprine (FLEXERIL) 5 mg tablet Take 1-2 Tabs by mouth three (3) times daily as needed for Muscle Spasm(s) for up to 7 days. , Print, Disp-22 Tab, R-0      oxyCODONE-acetaminophen (PERCOCET) 5-325 mg per tablet Take 1-2 Tabs by mouth every six (6) hours as needed for Pain. Max Daily Amount: 8 Tabs., Print, Disp-16 Tab, R-0         CONTINUE these medications which have NOT CHANGED    Details   levoFLOXacin (LEVAQUIN) 750 mg tablet Take 1 Tab by mouth daily. , Normal, Disp-7 Tab, R-0      nystatin (MYCOSTATIN) 100,000 unit/mL suspension Take 5 mL by mouth four (4) times daily.  swish and spit, Normal, Disp-473 mL, R-0      guaiFENesin-codeine (ROBITUSSIN AC) 100-10 mg/5 mL solution 1 teaspoon every 4 hours prn, Print, Disp-120 mL, R-0      mometasone (NASONEX) 50 mcg/actuation nasal spray 2 Sprays by Both Nostrils route daily. , Normal, Disp-1 Container, R-0      lisinopril-hydroCHLOROthiazide (PRINZIDE, ZESTORETIC) 10-12.5 mg per tablet Take 1 Tab by mouth daily. , Normal, Disp-30 Tab, R-5      aspirin 81 mg chewable tablet Take 1 Tab by mouth daily. , No Print, Disp-30 Tab, R-0      albuterol (PROVENTIL HFA, VENTOLIN HFA, PROAIR HFA) 90 mcg/actuation inhaler Take 2 Puffs by inhalation every four (4) hours as needed for Wheezing., Normal, Disp-3 Inhaler, R-3      fluticasone-salmeterol (ADVAIR) 100-50 mcg/dose diskus inhaler Take 1 Puff by inhalation every twelve (12) hours. , Normal, Disp-3 Inhaler, R-3      Omega-3 Fatty Acids (FISH OIL) 300 mg cap Take  by mouth., Historical Med      rosuvastatin (CRESTOR) 5 mg tablet Take  by mouth nightly., Historical Med      desloratadine (CLARINEX) 5 mg tablet Take 1 Tab by mouth daily. , Normal, Disp-90 Tab, R-3      omega-3 acid ethyl esters (LOVAZA) 1 gram capsule Take 2 Caps by mouth two (2) times daily (with meals). Indications: HYPERTRIGLYCERIDEMIA, Program, Disp-360 Cap, R-3               Scribe Attestation     Fabricio Townsend (Aj) acting as a scribe for and in the presence of Arin Willingham MD      March 11, 2018 at 10:15 PM       Provider Attestation:      I personally performed the services described in the documentation, reviewed the documentation, as recorded by the scribe in my presence, and it accurately and completely records my words and actions.  March 11, 2018 at 10:15 PM - Arin Willingham MD

## 2018-03-12 NOTE — DISCHARGE INSTRUCTIONS
Return for any new or worsening pain, fever, shortness of breath, vomiting, decreased fluid intake, weakness, numbness, dizziness, or any change or concerns. Back Pain: Care Instructions  Your Care Instructions    Back pain has many possible causes. It is often related to problems with muscles and ligaments of the back. It may also be related to problems with the nerves, discs, or bones of the back. Moving, lifting, standing, sitting, or sleeping in an awkward way can strain the back. Sometimes you don't notice the injury until later. Arthritis is another common cause of back pain. Although it may hurt a lot, back pain usually improves on its own within several weeks. Most people recover in 12 weeks or less. Using good home treatment and being careful not to stress your back can help you feel better sooner. Follow-up care is a key part of your treatment and safety. Be sure to make and go to all appointments, and call your doctor if you are having problems. It's also a good idea to know your test results and keep a list of the medicines you take. How can you care for yourself at home? · Sit or lie in positions that are most comfortable and reduce your pain. Try one of these positions when you lie down:  ¨ Lie on your back with your knees bent and supported by large pillows. ¨ Lie on the floor with your legs on the seat of a sofa or chair. Thomas Kurk on your side with your knees and hips bent and a pillow between your legs. ¨ Lie on your stomach if it does not make pain worse. · Do not sit up in bed, and avoid soft couches and twisted positions. Bed rest can help relieve pain at first, but it delays healing. Avoid bed rest after the first day of back pain. · Change positions every 30 minutes. If you must sit for long periods of time, take breaks from sitting. Get up and walk around, or lie in a comfortable position.   · Try using a heating pad on a low or medium setting for 15 to 20 minutes every 2 or 3 hours. Try a warm shower in place of one session with the heating pad. · You can also try an ice pack for 10 to 15 minutes every 2 to 3 hours. Put a thin cloth between the ice pack and your skin. · Take pain medicines exactly as directed. ¨ If the doctor gave you a prescription medicine for pain, take it as prescribed. ¨ If you are not taking a prescription pain medicine, ask your doctor if you can take an over-the-counter medicine. · Take short walks several times a day. You can start with 5 to 10 minutes, 3 or 4 times a day, and work up to longer walks. Walk on level surfaces and avoid hills and stairs until your back is better. · Return to work and other activities as soon as you can. Continued rest without activity is usually not good for your back. · To prevent future back pain, do exercises to stretch and strengthen your back and stomach. Learn how to use good posture, safe lifting techniques, and proper body mechanics. When should you call for help? Call your doctor now or seek immediate medical care if:  ? · You have new or worsening numbness in your legs. ? · You have new or worsening weakness in your legs. (This could make it hard to stand up.)   ? · You lose control of your bladder or bowels. ? Watch closely for changes in your health, and be sure to contact your doctor if:  ? · Your pain gets worse. ? · You are not getting better after 2 weeks. Where can you learn more? Go to http://yosef-steph.info/. Enter Z291 in the search box to learn more about \"Back Pain: Care Instructions. \"  Current as of: March 21, 2017  Content Version: 11.4  © 6644-6421 [email or web address]. Care instructions adapted under license by [employer] (which disclaims liability or warranty for this information).  If you have questions about a medical condition or this instruction, always ask your healthcare professional. Britt Bobo disclaims any warranty or liability for your use of this information. Sciatica: Care Instructions  Your Care Instructions    Sciatica (say \"mna-WR-ux-kuh\") is an irritation of one of the sciatic nerves, which come from the spinal cord in the lower back. The sciatic nerves and their branches extend down through the buttock to the foot. Sciatica can develop when an injured disc in the back presses against a spinal nerve root. Its main symptom is pain, numbness, or weakness that is often worse in the leg or foot than in the back. Sciatica often will improve and go away with time. Early treatment usually includes medicines and exercises to relieve pain. Follow-up care is a key part of your treatment and safety. Be sure to make and go to all appointments, and call your doctor if you are having problems. It's also a good idea to know your test results and keep a list of the medicines you take. How can you care for yourself at home? · Take pain medicines exactly as directed. ¨ If the doctor gave you a prescription medicine for pain, take it as prescribed. ¨ If you are not taking a prescription pain medicine, ask your doctor if you can take an over-the-counter medicine. · Use heat or ice to relieve pain. ¨ To apply heat, put a warm water bottle, heating pad set on low, or warm cloth on your back. Do not go to sleep with a heating pad on your skin. ¨ To use ice, put ice or a cold pack on the area for 10 to 20 minutes at a time. Put a thin cloth between the ice and your skin. · Avoid sitting if possible, unless it feels better than standing. · Alternate lying down with short walks. Increase your walking distance as you are able to without making your symptoms worse. · Do not do anything that makes your symptoms worse. When should you call for help? Call 911 anytime you think you may need emergency care. For example, call if:  · You are unable to move a leg at all.   Call your doctor now or seek immediate medical care if:  · You have new or worse symptoms in your legs or buttocks. Symptoms may include:  ¨ Numbness or tingling. ¨ Weakness. ¨ Pain. · You lose bladder or bowel control. Watch closely for changes in your health, and be sure to contact your doctor if:  · You are not getting better as expected. Where can you learn more? Go to http://yosef-steph.info/. Enter [phone] in the search box to learn more about \"Sciatica: Care Instructions. \"  Current as of: March 21, 2017  Content Version: 11.4  © 0450-8589 [email or web address]. Care instructions adapted under license by [employer] (which disclaims liability or warranty for this information). If you have questions about a medical condition or this instruction, always ask your healthcare professional. [address] any warranty or liability for your use of this information.

## 2018-03-14 ENCOUNTER — HOSPITAL ENCOUNTER (OUTPATIENT)
Dept: LAB | Age: 53
Discharge: HOME | End: 2018-03-14

## 2018-03-14 PROCEDURE — 99001 SPECIMEN HANDLING PT-LAB: CPT | Performed by: PHYSICIAN ASSISTANT

## 2018-03-15 LAB
CHOLEST SERPL-MCNC: 205 MG/DL (ref 100–199)
HDLC SERPL-MCNC: 34 MG/DL
INTERPRETATION, 910389: NORMAL
LDLC SERPL CALC-MCNC: 148 MG/DL (ref 0–99)
TRIGL SERPL-MCNC: 113 MG/DL (ref 0–149)
VLDLC SERPL CALC-MCNC: 23 MG/DL (ref 5–40)

## 2018-03-20 ENCOUNTER — OFFICE VISIT (OUTPATIENT)
Dept: FAMILY MEDICINE CLINIC | Age: 53
End: 2018-03-20

## 2018-03-20 VITALS
OXYGEN SATURATION: 98 % | RESPIRATION RATE: 16 BRPM | HEART RATE: 70 BPM | BODY MASS INDEX: 42.68 KG/M2 | HEIGHT: 60 IN | TEMPERATURE: 98.4 F | SYSTOLIC BLOOD PRESSURE: 140 MMHG | DIASTOLIC BLOOD PRESSURE: 82 MMHG | WEIGHT: 217.4 LBS

## 2018-03-20 DIAGNOSIS — M54.50 CHRONIC BILATERAL LOW BACK PAIN WITHOUT SCIATICA: ICD-10-CM

## 2018-03-20 DIAGNOSIS — G89.29 CHRONIC BILATERAL LOW BACK PAIN WITHOUT SCIATICA: ICD-10-CM

## 2018-03-20 DIAGNOSIS — E78.2 MIXED HYPERLIPIDEMIA: ICD-10-CM

## 2018-03-20 DIAGNOSIS — M79.645 CHRONIC THUMB PAIN, LEFT: ICD-10-CM

## 2018-03-20 DIAGNOSIS — J45.30 MILD PERSISTENT ASTHMA WITHOUT COMPLICATION: ICD-10-CM

## 2018-03-20 DIAGNOSIS — G89.29 CHRONIC THUMB PAIN, LEFT: ICD-10-CM

## 2018-03-20 DIAGNOSIS — I10 ESSENTIAL HYPERTENSION: Primary | ICD-10-CM

## 2018-03-20 RX ORDER — FLUTICASONE PROPIONATE AND SALMETEROL 100; 50 UG/1; UG/1
1 POWDER RESPIRATORY (INHALATION) EVERY 12 HOURS
Qty: 1 INHALER | Refills: 5 | Status: SHIPPED | OUTPATIENT
Start: 2018-03-20 | End: 2018-10-03 | Stop reason: SDUPTHER

## 2018-03-20 RX ORDER — ALBUTEROL SULFATE 90 UG/1
2 AEROSOL, METERED RESPIRATORY (INHALATION)
Qty: 1 INHALER | Refills: 5 | Status: SHIPPED | OUTPATIENT
Start: 2018-03-20 | End: 2019-02-20 | Stop reason: SDUPTHER

## 2018-03-20 RX ORDER — LISINOPRIL AND HYDROCHLOROTHIAZIDE 10; 12.5 MG/1; MG/1
1 TABLET ORAL DAILY
Qty: 30 TAB | Refills: 5 | Status: SHIPPED | OUTPATIENT
Start: 2018-03-20 | End: 2018-08-16 | Stop reason: SDUPTHER

## 2018-03-20 NOTE — PROGRESS NOTES
Chief Complaint   Patient presents with    Hypertension    Back Pain     ongoing    Shoulder Pain     ongoing    Hand Pain     both, 1 month     Visit Vitals    /82 (BP 1 Location: Right arm, BP Patient Position: Sitting)    Pulse 70    Temp 98.4 °F (36.9 °C)    Resp 16    Ht 5' (1.524 m)    Wt 217 lb 6.4 oz (98.6 kg)    SpO2 98%    BMI 42.46 kg/m2       Patient is not fasting. Patient in room # 5.     1. Have you been to the ER, urgent care clinic since your last visit? Hospitalized since your last visit? Yes When: 03/11/2018 Where: [facility] Reason for visit: Back pain    2. Have you seen or consulted any other health care providers outside of the [address] [place] [address] since your last visit? Include any pap smears or colon screening. No     Reviewed. Tdap and PPSV23 declined.

## 2018-03-20 NOTE — PROGRESS NOTES
Patient: Los Morfin MRN: [id number]  SSN: [id number]    YOB: 1965  Age: 46 y.o. Sex: female      Date of Service: 3/20/2018   Provider: NIMA Bates   Office Location:   [address] [place] [address] [address], [address]  Office Phone: [phone]  Office Fax: [phone]      REASON FOR VISIT:   Chief Complaint   Patient presents with    Hypertension    Back Pain     ongoing    Shoulder Pain     ongoing    Hand Pain     both, 1 month        VITALS:   Visit Vitals    /82 (BP 1 Location: Right arm, BP Patient Position: Sitting)    Pulse 70    Temp 98.4 °F (36.9 °C)    Resp 16    Ht 5' (1.524 m)    Wt 217 lb 6.4 oz (98.6 kg)    LMP 06/01/2017  Comment: patient had spotting tues and wed    SpO2 98%    BMI 42.46 kg/m2        MEDICATIONS:   Current Outpatient Prescriptions on File Prior to Visit   Medication Sig Dispense Refill    mometasone (NASONEX) 50 mcg/actuation nasal spray 2 Sprays by Both Nostrils route daily. 1 Container 0    lisinopril-hydroCHLOROthiazide (PRINZIDE, ZESTORETIC) 10-12.5 mg per tablet Take 1 Tab by mouth daily. 30 Tab 5    aspirin 81 mg chewable tablet Take 1 Tab by mouth daily. 30 Tab 0    albuterol (PROVENTIL HFA, VENTOLIN HFA, PROAIR HFA) 90 mcg/actuation inhaler Take 2 Puffs by inhalation every four (4) hours as needed for Wheezing. 3 Inhaler 3    fluticasone-salmeterol (ADVAIR) 100-50 mcg/dose diskus inhaler Take 1 Puff by inhalation every twelve (12) hours. 3 Inhaler 3    rosuvastatin (CRESTOR) 5 mg tablet Take  by mouth nightly.  omega-3 acid ethyl esters (LOVAZA) 1 gram capsule Take 2 Caps by mouth two (2) times daily (with meals). Indications: HYPERTRIGLYCERIDEMIA 360 Cap 3    Omega-3 Fatty Acids (FISH OIL) 300 mg cap Take  by mouth. No current facility-administered medications on file prior to visit.          ALLERGIES:   Allergies   Allergen Reactions    Motrin [Ibuprofen] Other (comments)     Directed not to take by physician. ACTIVE MEDICAL PROBLEMS:  Patient Active Problem List   Diagnosis Code    Allergic rhinitis J30.9    BMI 40.0-44.9, adult ([facility] Z68.41    Hypertension I10    Elevated TSH R94.6    Asthma J45.909    Chronic low back pain without sciatica M54.5, G89.29    Mixed hyperlipidemia E78.2        MEDICAL/SURGICAL HISTORY:  Past Medical History:   Diagnosis Date    Acute shoulder pain due to trauma 5/17/2016    Allergic rhinitis 11/9/2009    Asthma 2015    PFT confirmed    CAD (coronary artery disease)     hx mi-2013 ?  Carpal tunnel syndrome 11/9/2009    Chronic low back pain without sciatica 5/17/2016    Excessive use of nonsteroidal anti-inflammatory drug (NSAID) 5/17/2016    Fibroid, uterine 11/9/2009    GERD (gastroesophageal reflux disease) 11/9/2009    [facility]     heart attack 3 years ago    HLD (hyperlipidemia) 11/9/2009    Hypertension     Hypertriglyceridemia 5/17/2016    Hypothyroidism 5/17/2016    Knee pain, right     SOB (shortness of breath)     Weight gain 5/17/2016      Past Surgical History:   Procedure Laterality Date    COLONOSCOPY N/A 6/1/2017    COLONOSCOPY with Polypectomy performed by Earlene Talbot MD at [address] HX BREAST BIOPSY      benign, left    HX GYN      fibroids removed    HX MYOMECTOMY      open first, then embolization 2nd time.     HX OTHER SURGICAL      cyst removed    HX TONSILLECTOMY      CA COLONOSCOPY W/BIOPSY SINGLE/MULTIPLE N/A 06/01/2017    Dr. Shandra Mayes:  Family History   Problem Relation Age of Onset    Stroke Father      mini strokes, not sure of number    Hypertension Father     Cancer Maternal Grandfather      not sure type        SOCIAL HISTORY:  Social History   Substance Use Topics    Smoking status: Never Smoker    Smokeless tobacco: Never Used    Alcohol use No           HISTORY OF PRESENT ILLNESS: Osiel Ramires is a 46 y.o. female who presents to the office for a routine follow up visit. Hypertension -   Currently, the patient's condition is stable, but not at goal  Reports compliance with the following regimen: lisinopril-HCTZ 10-12.5 mg daily  Home BP readings: not checking. Lifestyle modifications: reports working on eating a healthier diet and losing weight.      Hyperlipidemia -   In the setting of known CAD - patient had MI several years ago   Currently, the patient's condition is not at goal.   Recent lipid panel 3/14/18 revealed , HDL 34, trig 113   She was previously prescribed Crestor 5 mg daily, but admits she stopped taking this months ago because she read that it has been associated with Alzheimer's disease      Asthma -   Currently, the patient's condition is stable. Compliant with Advair BID and albuterol PRN  Frequency of rescue inhaler: once daily   Triggers: seasonal allergies, illness  Denies any nighttime awakenings or limitation of daily activity     Low Back Pain -   With left sided sciatica  Patient continues to complain of pain  She only attended 2 physical therapy sessions. States her follow up was cancelled due to weather, and she never heard back about scheduling a follow up. Her back pain is getting in the way of her weight loss efforts as she is finding it hard to exercise the way she would like. Did some jumping jacks yesterday, which seemed to really exacerbated her symptoms    Thumb Pain -   Patient complains of ongoing pain in her left thumb x 2 months. She has tried applying warm compresses, wearing a brace, and taking Tylenol with minimal relief. She cannot recall any injury to her thumb or hand and has no recent change in activity. She is right handed. She denies any redness or swelling to her joints. X-ray done on 1/17 was normal     REVIEW OF SYSTEMS:  Review of Systems   Constitutional: Negative for chills, fever and malaise/fatigue.    Respiratory: Negative for cough, shortness of breath and wheezing. Cardiovascular: Negative for chest pain, palpitations and leg swelling. Musculoskeletal: Positive for back pain. PHYSICAL EXAMINATION:  Physical Exam   Constitutional: She is oriented to person, place, and time and well-developed, well-nourished, and in no distress. Cardiovascular: Normal rate, regular rhythm and normal heart sounds. Exam reveals no gallop and no friction rub. No murmur heard. Pulmonary/Chest: Effort normal and breath sounds normal. She has no wheezes. She has no rales. Neurological: She is alert and oriented to person, place, and time. Gait normal.   Skin: Skin is warm and dry. No rash noted. Psychiatric: Mood, memory and affect normal.        RESULTS:    Lab Results   Component Value Date/Time    Cholesterol, total 205 (H) 03/14/2018 12:00 AM    HDL Cholesterol 34 (L) 03/14/2018 12:00 AM    LDL, calculated 148 (H) 03/14/2018 12:00 AM    VLDL, calculated 23 03/14/2018 12:00 AM    Triglyceride 113 03/14/2018 12:00 AM    CHOL/HDL Ratio 5.4 (H) 01/13/2017 10:09 AM        ASSESSMENT/PLAN:  Diagnoses and all orders for this visit:    1. Essential hypertension  - BP mildly elevated today  - Patient is highly motivated to lose weight and to see if this has any bearing on her blood pressure. Will continue with lifestyle modification for now, but if BP still high at next visit, consider increasing dose of medication  Orders:    -     lisinopril-hydroCHLOROthiazide (PRINZIDE, ZESTORETIC) 10-12.5 mg per tablet; Take 1 Tab by mouth daily. 2. Mixed hyperlipidemia  - Again advised patient that based on her history of CAD, I highly recommend statin therapy for secondary prevention.   - Patient declines statin and will continue to work on weight loss. If LDL not < 100 at next visit, she is agreeable to resuming Crestor  Orders:    -     METABOLIC PANEL, COMPREHENSIVE  -     LIPID PANEL; Future    3.  Mild persistent asthma without complication  - Stable  - Inhalers refilled  Orders:    -     fluticasone-salmeterol (ADVAIR) 100-50 mcg/dose diskus inhaler; Take 1 Puff by inhalation every twelve (12) hours. -     albuterol (PROVENTIL HFA, VENTOLIN HFA, PROAIR HFA) 90 mcg/actuation inhaler; Take 2 Puffs by inhalation every four (4) hours as needed for Wheezing. 4. Chronic thumb pain, left  - Possible dequervains tenosynovitis? - Seems to be worsening since onset   - Will refer to ortho for further evaluation  Orders:    -     REFERRAL TO ORTHOPEDICS    5. Chronic bilateral low back pain without sciatica  - Encouraged patient to follow up with physical therapy. Explained that it typically takes several weeks of regular follow up to achieve any benefit from therapy. Sounds as though there was simply a miscommunication between patient and PT, and follow up was never scheduled   - She will call [facility] and let me know if new referral is needed     6. BMI 40.0-44.9, adult ([facility] [address]  - The majority of today's visit was spent discussing patient's weight loss efforts  - She will continue to focus on eating lean protein and plenty of vegetables, and getting regular exercise within the limitations of her back pain   - Her goal weight loss for her 3 month follow up is 15-20 lbs (or to be < 200 lbs)         Follow-up Disposition:  Return in about 3 months (around 6/20/2018) for Follow-up for Cholesterol and labs 1 week prior. .       All questions answered. Patient expresses understanding and agrees with the plan as detailed above.     PATIENT CARE TEAM:   Patient Care Team:  NIMA Alexander as PCP - 23 Cecilia Nelson MD (General Surgery)       Irvin Elizondo [place]   March 20, 2018    5:24 PM

## 2018-03-20 NOTE — PATIENT INSTRUCTIONS
High Blood Pressure: Care Instructions  Your Care Instructions    If your blood pressure is usually above 140/90, you have high blood pressure, or hypertension. That means the top number is 140 or higher or the bottom number is 90 or higher, or both. Despite what a lot of people think, high blood pressure usually doesn't cause headaches or make you feel dizzy or lightheaded. It usually has no symptoms. But it does increase your risk for heart attack, stroke, and kidney or eye damage. The higher your blood pressure, the more your risk increases. Your doctor will give you a goal for your blood pressure. Your goal will be based on your health and your age. An example of a goal is to keep your blood pressure below 140/90. Lifestyle changes, such as eating healthy and being active, are always important to help lower blood pressure. You might also take medicine to reach your blood pressure goal.  Follow-up care is a key part of your treatment and safety. Be sure to make and go to all appointments, and call your doctor if you are having problems. It's also a good idea to know your test results and keep a list of the medicines you take. How can you care for yourself at home? Medical treatment  · If you stop taking your medicine, your blood pressure will go back up. You may take one or more types of medicine to lower your blood pressure. Be safe with medicines. Take your medicine exactly as prescribed. Call your doctor if you think you are having a problem with your medicine. · Talk to your doctor before you start taking aspirin every day. Aspirin can help certain people lower their risk of a heart attack or stroke. But taking aspirin isn't right for everyone, because it can cause serious bleeding. · See your doctor regularly. You may need to see the doctor more often at first or until your blood pressure comes down.   · If you are taking blood pressure medicine, talk to your doctor before you take decongestants or anti-inflammatory medicine, such as ibuprofen. Some of these medicines can raise blood pressure. · Learn how to check your blood pressure at home. Lifestyle changes  · Stay at a healthy weight. This is especially important if you put on weight around the waist. Losing even 10 pounds can help you lower your blood pressure. · If your doctor recommends it, get more exercise. Walking is a good choice. Bit by bit, increase the amount you walk every day. Try for at least 30 minutes on most days of the week. You also may want to swim, bike, or do other activities. · Avoid or limit alcohol. Talk to your doctor about whether you can drink any alcohol. · Try to limit how much sodium you eat to less than 2,300 milligrams (mg) a day. Your doctor may ask you to try to eat less than 1,500 mg a day. · Eat plenty of fruits (such as bananas and oranges), vegetables, legumes, whole grains, and low-fat dairy products. · Lower the amount of saturated fat in your diet. Saturated fat is found in animal products such as milk, cheese, and meat. Limiting these foods may help you lose weight and also lower your risk for heart disease. · Do not smoke. Smoking increases your risk for heart attack and stroke. If you need help quitting, talk to your doctor about stop-smoking programs and medicines. These can increase your chances of quitting for good. When should you call for help? Call 911 anytime you think you may need emergency care. This may mean having symptoms that suggest that your blood pressure is causing a serious heart or blood vessel problem. Your blood pressure may be over 180/110. ? For example, call 911 if:  ? · You have symptoms of a heart attack. These may include:  ¨ Chest pain or pressure, or a strange feeling in the chest.  ¨ Sweating. ¨ Shortness of breath. ¨ Nausea or vomiting.   ¨ Pain, pressure, or a strange feeling in the back, neck, jaw, or upper belly or in one or both shoulders or arms.  ¨ Lightheadedness or sudden weakness. ¨ A fast or irregular heartbeat. ? · You have symptoms of a stroke. These may include:  ¨ Sudden numbness, tingling, weakness, or loss of movement in your face, arm, or leg, especially on only one side of your body. ¨ Sudden vision changes. ¨ Sudden trouble speaking. ¨ Sudden confusion or trouble understanding simple statements. ¨ Sudden problems with walking or balance. ¨ A sudden, severe headache that is different from past headaches. ? · You have severe back or belly pain. ?Do not wait until your blood pressure comes down on its own. Get help right away. ?Call your doctor now or seek immediate care if:  ? · Your blood pressure is much higher than normal (such as 180/110 or higher), but you don't have symptoms. ? · You think high blood pressure is causing symptoms, such as:  ¨ Severe headache. ¨ Blurry vision. ? Watch closely for changes in your health, and be sure to contact your doctor if:  ? · Your blood pressure measures 140/90 or higher at least 2 times. That means the top number is 140 or higher or the bottom number is 90 or higher, or both. ? · You think you may be having side effects from your blood pressure medicine. ? · Your blood pressure is usually normal, but it goes above normal at least 2 times. Where can you learn more? Go to http://yosef-steph.info/. Enter R477 in the search box to learn more about \"High Blood Pressure: Care Instructions. \"  Current as of: September 21, 2016  Content Version: 11.4  © 2396-9070 [employer]. Care instructions adapted under license by [employer] (which disclaims liability or warranty for this information). If you have questions about a medical condition or this instruction, always ask your healthcare professional. [place] [address] any warranty or liability for your use of this information.

## 2018-03-20 NOTE — MR AVS SNAPSHOT
[facility] [address] 
[phone] Patient: Nic Cabrera MRN: [id number] LBT:[id number] Visit Information Date & Time Provider Department Dept. Phone Encounter #  
 3/20/2018  4:30 PM Laura Finney, [facility] [id number] Follow-up Instructions Return in about 3 months (around 6/20/2018) for Follow-up for Cholesterol and labs 1 week prior. Faby [place] Upcoming Health Maintenance Date Due Pneumococcal 19-64 Medium Risk (1 of 1 - PPSV23) 4/2/1984 DTaP/Tdap/Td series (1 - Tdap) 4/2/1986 BREAST CANCER SCRN MAMMOGRAM 1/18/2018 PAP AKA CERVICAL CYTOLOGY 1/13/2020 COLONOSCOPY 6/1/2022 Allergies as of 3/20/2018  Review Complete On: 3/20/2018 By: NIMA Monson Severity Noted Reaction Type Reactions Motrin [Ibuprofen]  02/09/2018    Other (comments) Directed not to take by physician. Current Immunizations  Never Reviewed No immunizations on file. Not reviewed this visit You Were Diagnosed With   
  
 Codes Comments Essential hypertension    -  Primary ICD-10-CM: I10 
ICD-9-CM: 401.9 Mixed hyperlipidemia     ICD-10-CM: E78.2 ICD-9-CM: 272.2 Mild persistent asthma without complication     NPY-84-HX: J45.30 ICD-9-CM: 493.90 Chronic thumb pain, left     ICD-10-CM: M79.645, G89.29 ICD-9-CM: 729.5, 338.29 Chronic bilateral low back pain without sciatica     ICD-10-CM: M54.5, G89.29 ICD-9-CM: 724.2, 338.29 BMI 40.0-44.9, adult [facility])     ICD-10-CM: Z68.41 
ICD-9-CM: V85.41 Vitals BP Pulse Temp Resp Height(growth percentile) Weight(growth percentile) 140/82 (BP 1 Location: Right arm, BP Patient Position: Sitting) 70 98.4 °F (36.9 °C) 16 5' (1.524 m) 217 lb 6.4 oz (98.6 kg) LMP SpO2 BMI OB Status Smoking Status 06/01/2017 98% 42.46 kg/m2 Postmenopausal Never Smoker Vitals History BMI and BSA Data Body Mass Index Body Surface Area  
 42.46 kg/m 2 2.04 m 2 Preferred Pharmacy Pharmacy Name Phone [address] [place] [address]. [phone] Your Updated Medication List  
  
   
This list is accurate as of 3/20/18  5:10 PM.  Always use your most recent med list.  
  
  
  
  
 albuterol 90 mcg/actuation inhaler Commonly known as:  PROVENTIL HFA, VENTOLIN HFA, PROAIR HFA Take 2 Puffs by inhalation every four (4) hours as needed for Wheezing. aspirin 81 mg chewable tablet Take 1 Tab by mouth daily. CRESTOR 5 mg tablet Generic drug:  rosuvastatin Take  by mouth nightly. FISH  mg Cap Generic drug:  Omega-3 Fatty Acids Take  by mouth. fluticasone-salmeterol 100-50 mcg/dose diskus inhaler Commonly known as:  ADVAIR Take 1 Puff by inhalation every twelve (12) hours. lisinopril-hydroCHLOROthiazide 10-12.5 mg per tablet Commonly known as:  Michael Moulder Take 1 Tab by mouth daily. mometasone 50 mcg/actuation nasal spray Commonly known as:  NASONEX  
2 Sprays by Both Nostrils route daily. omega-3 acid ethyl esters 1 gram capsule Commonly known as:  Doreen Sabine Take 2 Caps by mouth two (2) times daily (with meals). Indications: HYPERTRIGLYCERIDEMIA Prescriptions Sent to Pharmacy Refills  
 fluticasone-salmeterol (ADVAIR) 100-50 mcg/dose diskus inhaler 5 Sig: Take 1 Puff by inhalation every twelve (12) hours. Class: Normal  
 Pharmacy: [facility] [address] DAM[address] STALLWORTH [address] [place] [address]. Ph #: [phone] Route: Inhalation  
 albuterol (PROVENTIL HFA, VENTOLIN HFA, PROAIR HFA) 90 mcg/actuation inhaler 5 Sig: Take 2 Puffs by inhalation every four (4) hours as needed for Wheezing. Class: Normal  
 Pharmacy: [facility] [address] DAM[address] STALLWORTH [address] [address]. Ph #: [phone] Route: Inhalation lisinopril-hydroCHLOROthiazide (PRINZIDE, ZESTORETIC) 10-12.5 mg per tablet 5 Sig: Take 1 Tab by mouth daily. Class: Normal  
 Pharmacy: [address].  #: [phone] Route: Oral  
  
We Performed the Following METABOLIC PANEL, COMPREHENSIVE [13906 CPT(R)] REFERRAL TO ORTHOPEDICS [AKE463 Custom] Follow-up Instructions Return in about 3 months (around 6/20/2018) for Follow-up for Cholesterol and labs 1 week prior. Arvid Negin To-Do List   
 Around 03/21/2018 Lab:  LIPID PANEL Referral Information Referral ID Referred By Referred To  
  
 [id number] Kentrell Greenberg MD   
   [address] [facility] [address] Phone: [phone] Fax: [phone] Visits Status Start Date End Date 1 New Request 3/20/18 3/20/19 If your referral has a status of pending review or denied, additional information will be sent to support the outcome of this decision. Patient Instructions High Blood Pressure: Care Instructions Your Care Instructions If your blood pressure is usually above 140/90, you have high blood pressure, or hypertension. That means the top number is 140 or higher or the bottom number is 90 or higher, or both. Despite what a lot of people think, high blood pressure usually doesn't cause headaches or make you feel dizzy or lightheaded. It usually has no symptoms. But it does increase your risk for heart attack, stroke, and kidney or eye damage. The higher your blood pressure, the more your risk increases. Your doctor will give you a goal for your blood pressure. Your goal will be based on your health and your age. An example of a goal is to keep your blood pressure below 140/90. Lifestyle changes, such as eating healthy and being active, are always important to help lower blood pressure.  You might also take medicine to reach your blood pressure goal. 
 Follow-up care is a key part of your treatment and safety. Be sure to make and go to all appointments, and call your doctor if you are having problems. It's also a good idea to know your test results and keep a list of the medicines you take. How can you care for yourself at home? Medical treatment · If you stop taking your medicine, your blood pressure will go back up. You may take one or more types of medicine to lower your blood pressure. Be safe with medicines. Take your medicine exactly as prescribed. Call your doctor if you think you are having a problem with your medicine. · Talk to your doctor before you start taking aspirin every day. Aspirin can help certain people lower their risk of a heart attack or stroke. But taking aspirin isn't right for everyone, because it can cause serious bleeding. · See your doctor regularly. You may need to see the doctor more often at first or until your blood pressure comes down. · If you are taking blood pressure medicine, talk to your doctor before you take decongestants or anti-inflammatory medicine, such as ibuprofen. Some of these medicines can raise blood pressure. · Learn how to check your blood pressure at home. Lifestyle changes · Stay at a healthy weight. This is especially important if you put on weight around the waist. Losing even 10 pounds can help you lower your blood pressure. · If your doctor recommends it, get more exercise. Walking is a good choice. Bit by bit, increase the amount you walk every day. Try for at least 30 minutes on most days of the week. You also may want to swim, bike, or do other activities. · Avoid or limit alcohol. Talk to your doctor about whether you can drink any alcohol. · Try to limit how much sodium you eat to less than 2,300 milligrams (mg) a day. Your doctor may ask you to try to eat less than 1,500 mg a day.  
· Eat plenty of fruits (such as bananas and oranges), vegetables, legumes, whole grains, and low-fat dairy products. · Lower the amount of saturated fat in your diet. Saturated fat is found in animal products such as milk, cheese, and meat. Limiting these foods may help you lose weight and also lower your risk for heart disease. · Do not smoke. Smoking increases your risk for heart attack and stroke. If you need help quitting, talk to your doctor about stop-smoking programs and medicines. These can increase your chances of quitting for good. When should you call for help? Call 911 anytime you think you may need emergency care. This may mean having symptoms that suggest that your blood pressure is causing a serious heart or blood vessel problem. Your blood pressure may be over 180/110. ? For example, call 911 if: 
? · You have symptoms of a heart attack. These may include: ¨ Chest pain or pressure, or a strange feeling in the chest. 
¨ Sweating. ¨ Shortness of breath. ¨ Nausea or vomiting. ¨ Pain, pressure, or a strange feeling in the back, neck, jaw, or upper belly or in one or both shoulders or arms. ¨ Lightheadedness or sudden weakness. ¨ A fast or irregular heartbeat. ? · You have symptoms of a stroke. These may include: 
¨ Sudden numbness, tingling, weakness, or loss of movement in your face, arm, or leg, especially on only one side of your body. ¨ Sudden vision changes. ¨ Sudden trouble speaking. ¨ Sudden confusion or trouble understanding simple statements. ¨ Sudden problems with walking or balance. ¨ A sudden, severe headache that is different from past headaches. ? · You have severe back or belly pain. ?Do not wait until your blood pressure comes down on its own. Get help right away. ?Call your doctor now or seek immediate care if: 
? · Your blood pressure is much higher than normal (such as 180/110 or higher), but you don't have symptoms. ? · You think high blood pressure is causing symptoms, such as: ¨ Severe headache. ¨ Blurry vision. ?Watch closely for changes in your health, and be sure to contact your doctor if: 
? · Your blood pressure measures 140/90 or higher at least 2 times. That means the top number is 140 or higher or the bottom number is 90 or higher, or both. ? · You think you may be having side effects from your blood pressure medicine. ? · Your blood pressure is usually normal, but it goes above normal at least 2 times. Where can you learn more? Go to http://yosef-steph.info/. Enter X020 in the search box to learn more about \"High Blood Pressure: Care Instructions. \" Current as of: September 21, 2016 Content Version: 11.4 © 5459-7753 [employer]. Care instructions adapted under license by [employer] (which disclaims liability or warranty for this information). If you have questions about a medical condition or this instruction, always ask your healthcare professional. [place] [address] any warranty or liability for your use of this information. Introducing [facility]! [facility] introduces Tripl patient portal. Now you can access parts of your medical record, email your doctor's office, and request medication refills online. 1. In your internet browser, go to [email or web address] 2. Click on the First Time User? Click Here link in the Sign In box. You will see the New Member Sign Up page. 3. Enter your Tripl Access Code exactly as it appears below. You will not need to use this code after youve completed the sign-up process. If you do not sign up before the expiration date, you must request a new code. · Tripl Access Code: [email or web address] Expires: 6/7/2018  6:24 AM 
 
4. Enter the last four digits of your Social Security Number (xxxx) and Date of Birth (mm/dd/yyyy) as indicated and click Submit. You will be taken to the next sign-up page. 5. Create a Adomik ID. This will be your Adomik login ID and cannot be changed, so think of one that is secure and easy to remember. 6. Create a Adomik password. You can change your password at any time. 7. Enter your Password Reset Question and Answer. This can be used at a later time if you forget your password. 8. Enter your e-mail address. You will receive e-mail notification when new information is available in [address] 9. Click Sign Up. You can now view and download portions of your medical record. 10. Click the Download Summary menu link to download a portable copy of your medical information. If you have questions, please visit the Frequently Asked Questions section of the Adomik website. Remember, Adomik is NOT to be used for urgent needs. For medical emergencies, dial 911. Now available from your iPhone and Android! Please provide this summary of care documentation to your next provider. Your primary care clinician is listed as Laura Finney. If you have any questions after today's visit, please call [phone].

## 2018-03-21 DIAGNOSIS — E78.2 MIXED HYPERLIPIDEMIA: ICD-10-CM

## 2018-04-10 ENCOUNTER — OFFICE VISIT (OUTPATIENT)
Dept: ORTHOPEDIC SURGERY | Facility: CLINIC | Age: 53
End: 2018-04-10

## 2018-04-10 VITALS
BODY MASS INDEX: 41.66 KG/M2 | HEART RATE: 67 BPM | WEIGHT: 212.2 LBS | RESPIRATION RATE: 16 BRPM | TEMPERATURE: 98.6 F | DIASTOLIC BLOOD PRESSURE: 65 MMHG | OXYGEN SATURATION: 97 % | HEIGHT: 60 IN | SYSTOLIC BLOOD PRESSURE: 140 MMHG

## 2018-04-10 DIAGNOSIS — M65.312 TRIGGER FINGER OF LEFT THUMB: Primary | ICD-10-CM

## 2018-04-10 RX ORDER — BETAMETHASONE SODIUM PHOSPHATE AND BETAMETHASONE ACETATE 3; 3 MG/ML; MG/ML
6 INJECTION, SUSPENSION INTRA-ARTICULAR; INTRALESIONAL; INTRAMUSCULAR; SOFT TISSUE ONCE
Qty: 1 ML | Refills: 0
Start: 2018-04-10 | End: 2018-04-10

## 2018-04-10 NOTE — PROGRESS NOTES
Patient: Nick Redd                MRN: [id number]       SSN: [id number]  YOB: 1965        AGE: 48 y.o. SEX: female  Body mass index is 41.44 kg/(m^2). PCP: NIMA Parra  04/10/18    Chief Complaint: Left thumb pain    HISTORY OF PRESENT ILLNESS: Ms. Theo Mahoney is a 63-year-old female who presents today with pain in her left thumb. She is right-handed. She states she has had the pain off and on for three to four months. She has pain whenever she tries to grab or hold things. The pain is pretty constant at this point. It is located at the base of her thumb. She is unable to flex her thumb at this time because of the pain. She also notices some locking and catching. She has also noticed this in her right thumb. This is how it started in her left hand but it has progressed to the point where now she has a lot of pain. She denies any numbness or tingling. She also reports occasional burning pain at the base of her thumb. She has not had any therapy or injections for this. Past Medical History:   Diagnosis Date    Acute shoulder pain due to trauma 5/17/2016    Allergic rhinitis 11/9/2009    Asthma 2015    PFT confirmed    CAD (coronary artery disease)     hx mi-2013 ?     Carpal tunnel syndrome 11/9/2009    Chronic low back pain without sciatica 5/17/2016    Excessive use of nonsteroidal anti-inflammatory drug (NSAID) 5/17/2016    Fibroid, uterine 11/9/2009    GERD (gastroesophageal reflux disease) 11/9/2009    Heart failure ([address]     heart attack 3 years ago    HLD (hyperlipidemia) 11/9/2009    Hypertension     Hypertriglyceridemia 5/17/2016    Hypothyroidism 5/17/2016    Knee pain, right     SOB (shortness of breath)     Weight gain 5/17/2016       Family History   Problem Relation Age of Onset    Hypertension Mother     Stroke Father      mini strokes, not sure of number    Hypertension Father     Seizures Father     Cancer Maternal Grandfather      not sure type       Current Outpatient Prescriptions   Medication Sig Dispense Refill    fluticasone-salmeterol (ADVAIR) 100-50 mcg/dose diskus inhaler Take 1 Puff by inhalation every twelve (12) hours. 1 Inhaler 5    albuterol (PROVENTIL HFA, VENTOLIN HFA, PROAIR HFA) 90 mcg/actuation inhaler Take 2 Puffs by inhalation every four (4) hours as needed for Wheezing. 1 Inhaler 5    lisinopril-hydroCHLOROthiazide (PRINZIDE, ZESTORETIC) 10-12.5 mg per tablet Take 1 Tab by mouth daily. 30 Tab 5    mometasone (NASONEX) 50 mcg/actuation nasal spray 2 Sprays by Both Nostrils route daily. 1 Container 0    aspirin 81 mg chewable tablet Take 1 Tab by mouth daily. 30 Tab 0    Omega-3 Fatty Acids (FISH OIL) 300 mg cap Take  by mouth.  rosuvastatin (CRESTOR) 5 mg tablet Take  by mouth nightly. Allergies   Allergen Reactions    Motrin [Ibuprofen] Other (comments)     Directed not to take by physician. Past Surgical History:   Procedure Laterality Date    COLONOSCOPY N/A 6/1/2017    COLONOSCOPY with Polypectomy performed by Liza Ghotra MD at [address] HX BREAST BIOPSY      benign, left    HX GYN      fibroids removed    HX MYOMECTOMY      open first, then embolization 2nd time.  HX OTHER SURGICAL      cyst removed    HX TONSILLECTOMY      [place] COLONOSCOPY W/BIOPSY SINGLE/MULTIPLE N/A 06/01/2017    Dr. Ham Childs       Social History     Social History    Marital status:      Spouse name: N/A    Number of children: N/A    Years of education: N/A     Occupational History    Not on file.      Social History Main Topics    Smoking status: Never Smoker    Smokeless tobacco: Never Used    Alcohol use No    Drug use: No    Sexual activity: Yes     Partners: Male     Birth control/ protection: None     Other Topics Concern     Service No    Blood Transfusions No    Caffeine Concern No    Occupational Exposure No    Hobby Hazards No    Sleep Concern No    Stress Concern No    Weight Concern Yes    Special Diet No    Back Care Yes    Exercise No    Bike Helmet No    Seat Belt Yes     some times    Self-Exams Yes     Social History Narrative       REVIEW OF SYSTEMS:      CON: negative for recent weight loss/gain, fever, or chills  EYE: negative for double or blurry vision  ENT: negative for hoarseness  RS:   negative for cough, URI, SOB  CV:  negative for chest pain, palpitations  GI:    negative for blood in stool, nausea/vomiting  :  negative for blood in urine  MS: As per HPI  Other systems reviewed and noted below. PHYSICAL EXAMINATION:  Visit Vitals    /65    Pulse 67    Temp 98.6 °F (37 °C) (Oral)    Resp 16    Ht 5' (1.524 m)    Wt 212 lb 3.2 oz (96.3 kg)    LMP 06/01/2017  Comment: patient had spotting tues and wed    SpO2 97%    BMI 41.44 kg/m2     Body mass index is 41.44 kg/(m^2). GENERAL: Alert and oriented x3, in no acute distress, well-developed, well-nourished. HEENT: Normocephalic, atraumatic. RESP: Non labored breathing with equal chest rise on inspiration. CV: Well perfused extremities. No cyanosis or clubbing noted. ABDOMEN: Soft, non-tender, non-distended. PHYSICAL EXAMINATION:  Physical examination of the left hand shows tenderness to palpation at the base of the thumb over the A1 pulley. She has difficulty with flexion of the thumb due to pain at the IP joint. There is no palpable triggering, but I think it is because she cannot get her thumb in that position. There is no effusion, warmth, or erythema. She is neurovascularly intact distally. There is no other palpable triggering or tenderness to palpation in the left hand. On the right hand she does have a palpable triggering thumb. There is a palpable nodule at the base of the thumb. Otherwise the right hand is without any other triggering or tenderness to palpation.     PROCEDURE:  Under sterile technique with the after discussing the risks and benefits with the patients permission and a time-out, the left thumb is injected at the base directly over the A1 pulley with a combination of Lidocaine and steroid. The patient tolerated the procedure well. ASSESSMENT/PLAN:  Ms. Denny Randolph is a 55-year-old female with bilateral trigger thumbs. The left is worse than the right. I discussed the treatment options with her at length today. I recommended a corticosteroid injection into the base of the thumb over the A1 pulley in the office today to help try to treat her symptoms. We also discussed possible surgery if this was unsuccessful.   I would like to see her back as needed should her symptoms persist.    [address]  OFFICE PROCEDURE PROGRESS NOTE        Chart reviewed for the following:   Mariam Prater MD, have reviewed the History, Physical and updated the Allergic reactions for [address] [place] [address] Road performed immediately prior to start of procedure:   Mariam Prater MD, have performed the following reviews on Meg Ceron prior to the start of the procedure:            * Patient was identified by name and date of birth   * Agreement on procedure being performed was verified  * Risks and Benefits explained to the patient  * Procedure site verified and marked as necessary  * Patient was positioned for comfort  * Consent was signed and verified     Time: 1500      Date of procedure: 4/10/2018    Procedure performed by:  Bridgett Gaines MD    Provider assisted by: Melissa Quan LPN    Patient assisted by: self    How tolerated by patient: tolerated the procedure well with no complications    Post Procedural Pain Scale: 2 - Hurts Little Bit    Comments: none      Electronically signed by: Bridgett Gaines MD

## 2018-04-11 ENCOUNTER — TELEPHONE (OUTPATIENT)
Dept: FAMILY MEDICINE CLINIC | Age: 53
End: 2018-04-11

## 2018-04-11 DIAGNOSIS — M54.41 CHRONIC MIDLINE LOW BACK PAIN WITH RIGHT-SIDED SCIATICA: Primary | ICD-10-CM

## 2018-04-11 DIAGNOSIS — G89.29 CHRONIC MIDLINE LOW BACK PAIN WITH RIGHT-SIDED SCIATICA: Primary | ICD-10-CM

## 2018-04-11 NOTE — TELEPHONE ENCOUNTER
Pt calling for an updated referral to her PT that was in [address] [place] [address] [address]. Please call pt at your earliest convenience.

## 2018-04-12 NOTE — TELEPHONE ENCOUNTER
Called home number. Left message on answering machine to return the call. Needed to verify if patient would like to return the PT. Patient was d/c due to noncompliance. Referral can be completed once information is verified.

## 2018-04-13 NOTE — TELEPHONE ENCOUNTER
Called home number. Left message on answering machine to return the call. This call was concerning message below.

## 2018-04-18 NOTE — TELEPHONE ENCOUNTER
Pt would like to go back to physical therapy. Stopped going when weather was bad in [place]. And never went back.  Please submit new referral

## 2018-05-02 ENCOUNTER — APPOINTMENT (OUTPATIENT)
Dept: PHYSICAL THERAPY | Age: 53
End: 2018-05-02

## 2018-05-07 ENCOUNTER — HOSPITAL ENCOUNTER (OUTPATIENT)
Dept: PHYSICAL THERAPY | Age: 53
Discharge: HOME | End: 2018-05-07
Payer: COMMERCIAL

## 2018-05-07 PROCEDURE — 97110 THERAPEUTIC EXERCISES: CPT

## 2018-05-07 PROCEDURE — 97161 PT EVAL LOW COMPLEX 20 MIN: CPT

## 2018-05-07 NOTE — PROGRESS NOTES
PT DAILY TREATMENT NOTE 8-14    Patient Name: Celine Cline  Date:2018  : 1965  [x]  Patient  Verified  Payor: Carmen Severance / Plan: Jia Plush / Product Type: HMO /    In time:415  Out time:445  Total Treatment Time (min): 30  Visit #: 1 of 8    Treatment Area: Low back pain [M54.5]  Lumbago with sciatica, right side [M54.41]    SUBJECTIVE  Pain Level (0-10 scale): 7  Any medication changes, allergies to medications, adverse drug reactions, diagnosis change, or new procedure performed?: [x] No    [] Yes (see summary sheet for update)  Subjective functional status/changes:   [x] See Eval form in paper chart     OBJECTIVE      22 min [x]Eval                  []Re-Eval         8 min Therapeutic Exercise:  [x] See flow sheet :HEP   Rationale: increase ROM, increase strength and improve coordination to improve the patients ability to perform ADls. With   [] TE   [] TA   [] neuro   [] other: Patient Education: [x] Review HEP    [] Progressed/Changed HEP based on:   [] positioning   [] body mechanics   [] transfers   [] heat/ice application    [] other:           Pain Level (0-10 scale) post treatment: 7    ASSESSMENT:   [x]  See Evaluation          Goals:  Short Term Goals: To be accomplished in 1 weeks:  1. Establish HEP for ROM & Strengthening. Long Term Goals: To be accomplished in 4 weeks:  1. Patient will be independent with HEP for ROM & Strengthening. Eval Status:na  2. Pt will report decrease in pain rating on VAS to < 4/10 to improve ease with daily tasks. Eval Status:7/10  3. Pt will increase FOTO score to set d/c points to demonstrate increased ease with ADLs. Eval Status: FOTO: reassess at nv  4. Pt will report ability to walk >30 min without pain to improve functional mobility.     Eval Status:30 min but with pain  PLAN      [x]  Continue plan of care           Joanne Cobian, PT 2018  4:47 PM

## 2018-05-07 NOTE — PROGRESS NOTES
[facility] 45  [address] [facility] [address], [address] [phone] fax    Plan of Care/ Statement of Necessity for Physical Therapy Services           Patient name: Megan Ortiz Start of Care: 2018   Referral source: Leandra Multani MD : 1965    Medical Diagnosis: Low back pain [M54.5]  Lumbago with sciatica, right side [M54.41]   Onset Date:18    Treatment Diagnosis: lumbar radiculopathy   Prior Hospitalization: see medical history Provider#: [id number]   Medications: Verified on Patient summary List    Comorbidities: HTN, asthma   Prior Level of Function: works at [employer]  Functionally independent. Has recently started back at gym    The Plan of Care and following information is based on the information from the initial evaluation. Assessment/ key information: Patient is a 48 y. o.female presenting with Low back pain [M54.5]  Lumbago with sciatica, right side [M54.41]. Ms. Sharon Sheth presents to initial PT evaluation with c/o worsening low back & right leg pain worsening over the past few years. She reports no clear mechanism of injury, but instead reports gradual progression of stiffness and pain referring into the right leg. Hip & core strength are impaired, and SLR(+) , slump (+) on the right. She demonstrates no clear flexion vs. Extension bias, and there is likely a musculoskeletal component contributing to localized lumbar pain as well. We will work to address functional limitations with emphasis on pain control and centralization of LE symptoms. . Patient will benefit from skilled PT services to address deficits and facilitate return to premorbid activity level and promote improved quality of life.        Evaluation Complexity History MEDIUM  Complexity : 1-2 comorbidities / personal factors will impact the outcome/ POC ; Examination LOW Complexity : 1-2 Standardized tests and measures addressing body structure, function, activity limitation and / or participation in recreation  ;Presentation MEDIUM Complexity : Evolving with changing characteristics  ; Clinical Decision Making Other outcome measures pain on VAS: 7/10  HIGH   Overall Complexity Rating: LOW   Problem List: pain affecting function, decrease ROM, decrease strength, edema affecting function, impaired gait/ balance, decrease ADL/ functional abilitiies, decrease activity tolerance, decrease flexibility/ joint mobility and decrease transfer abilities   Treatment Plan may include any combination of the following: Therapeutic exercise, Therapeutic activities, Neuromuscular re-education, Physical agent/modality, Gait/balance training, Manual therapy, Aquatic therapy, Patient education, Self Care training, Functional mobility training, Home safety training and Stair training  Patient / Family readiness to learn indicated by: asking questions, trying to perform skills and interest  Persons(s) to be included in education: patient (P)  Barriers to Learning/Limitations: None  Patient Goal (s): get rid of pain.   Patient Self Reported Health Status: good  Rehabilitation Potential: good  Short Term Goals: To be accomplished in 1 weeks:  1. Establish HEP for ROM & Strengthening. Long Term Goals: To be accomplished in 4 weeks:  1. Patient will be independent with HEP for ROM & Strengthening. Eval Status:na  2. Pt will report decrease in pain rating on VAS to < 4/10 to improve ease with daily tasks. Eval Status:7/10  3. Pt will increase FOTO score to set d/c points to demonstrate increased ease with ADLs. Eval Status: FOTO: reassess at nv  4. Pt will report ability to walk >30 min without pain to improve functional mobility. Eval Status:30 min but with pain    Frequency / Duration: Patient to be seen 2 times per week for 4 weeks.     Patient/ Caregiver education and instruction: Diagnosis, prognosis, self care, activity modification and exercises   [x]  Plan of care has been reviewed with KIM Miller, PT 5/7/2018 4:19 PM    ________________________________________________________________________    I certify that the above Therapy Services are being furnished while the patient is under my care. I agree with the treatment plan and certify that this therapy is necessary.     Physician's Signature:____________________  Date:____________Time: _________    Please sign and return to [facility] 45  [address] [place], [address] [phone] fax

## 2018-05-11 ENCOUNTER — HOSPITAL ENCOUNTER (OUTPATIENT)
Dept: PHYSICAL THERAPY | Age: 53
Discharge: HOME | End: 2018-05-11
Payer: COMMERCIAL

## 2018-05-11 PROCEDURE — 97112 NEUROMUSCULAR REEDUCATION: CPT

## 2018-05-11 PROCEDURE — 97110 THERAPEUTIC EXERCISES: CPT

## 2018-05-11 NOTE — PROGRESS NOTES
PT DAILY TREATMENT NOTE     Patient Name: Lisa Mehta  Date:2018  : 1965  [x]  Patient  Verified  Payor: Ozziecharles Cain / Plan: [employer] [address] / Product Type: HMO /    In time:1204  Out time:1245  Total Treatment Time (min): 41  Visit #: 2 of 8    Treatment Area: Low back pain [M54.5]  Lumbago with sciatica, right side [M54.41]    SUBJECTIVE  Pain Level (0-10 scale): 5-6  Any medication changes, allergies to medications, adverse drug reactions, diagnosis change, or new procedure performed?: [x] No    [] Yes (see summary sheet for update)  Subjective functional status/changes:   [] No changes reported  \"My back hurts. \"    OBJECTIVE    Modality rationale: decrease pain to improve the patients ability to improve mobility and positional tolerance   Min Type Additional Details    [] Estim:  []Unatt       []IFC  []Premod                        []Other:  []w/ice   []w/heat  Position:  Location:    [] Estim: []Att    []TENS instruct  []NMES                    []Other:  []w/US   []w/ice   []w/heat  Position:  Location:    []  Traction: [] Cervical       []Lumbar                       [] Prone          []Supine                       []Intermittent   []Continuous Lbs:  [] before manual  [] after manual    []  Ultrasound: []Continuous   [] Pulsed                           []1MHz   []3MHz W/cm2:  Location:    []  Iontophoresis with dexamethasone         Location: [] Take home patch   [] In clinic   10 []  Ice     [x]  heat  []  Ice massage  []  Laser   []  Anodyne Position: supine with wedge   Location: lower back     []  Laser with stim  []  Other:  Position:  Location:    []  Vasopneumatic Device Pressure:       [] lo [] med [] hi   Temperature: [] lo [] med [] hi   [x] Skin assessment post-treatment:  [x]intact []redness- no adverse reaction    []redness  adverse reaction:     10 min Therapeutic Exercise:  [x] See flow sheet :   Rationale: increase ROM and increase strength to improve the patients ability to perform ADLs    21 min Neuromuscular Re-education:  [x]  See flow sheet : core stabilization activities    Rationale: increase ROM, increase strength, improve coordination, improve balance and increase proprioception  to improve the patients ability to improve mobility and upright posture        With   [x] TE   [] TA   [x] neuro   [] other: Patient Education: [x] Review HEP    [] Progressed/Changed HEP based on:   [x] positioning   [x] body mechanics   [] transfers   [] heat/ice application    [] other:      Other Objective/Functional Measures:      Pain Level (0-10 scale) post treatment: 7-8    ASSESSMENT/Changes in Function: Initiated POC. Pt needed several cues to improve postural/body awareness. Apprehensive to perform wall slides due to back pain. Needs cuing for TA draw. Patient will continue to benefit from skilled PT services to modify and progress therapeutic interventions, address functional mobility deficits, address ROM deficits, address strength deficits, analyze and address soft tissue restrictions, analyze and cue movement patterns, analyze and modify body mechanics/ergonomics, assess and modify postural abnormalities, address imbalance/dizziness and instruct in home and community integration to attain remaining goals. [x]  See Plan of Care  []  See progress note/recertification  []  See Discharge Summary         Progress towards goals / Updated goals:  Short Term Goals: To be accomplished in 1 weeks:  1. Establish HEP for ROM & Strengthening. MET   Long Term Goals: To be accomplished in 4 weeks:  1. Patient will be independent with HEP for ROM & Strengthening. Eval Status:na  2. Pt will report decrease in pain rating on VAS to < 4/10 to improve ease with daily tasks. Eval Status:7/10  3. Pt will increase FOTO score to set d/c points to demonstrate increased ease with ADLs. Eval Status: FOTO: reassess at nv  4.  Pt will report ability to walk >30 min without pain to improve functional mobility.     Eval Status:30 min but with pain    PLAN  []  Upgrade activities as tolerated     [x]  Continue plan of care  []  Update interventions per flow sheet       []  Discharge due to:_  []  Other:_      Leonid Ferguson PTA, CSCS 5/11/2018  12:47 PM    Future Appointments  Date Time Provider Bharat Pham   5/14/2018 12:00 PM Leonid Ferguson PTA [facility]   5/16/2018 12:00 PM Leonid Ferguson PTA [facility]   5/21/2018 12:00 PM Leonid Ferguson PTA [facility]   5/25/2018 12:00 PM Liana Olvera, PT [facility]   5/29/2018 12:00 PM Joanne Cobian, PT [facility]   5/31/2018 12:00 PM Leonid Ferguson PTA [facility]   6/20/2018 4:30 PM NIMA [address]

## 2018-05-14 ENCOUNTER — HOSPITAL ENCOUNTER (OUTPATIENT)
Dept: PHYSICAL THERAPY | Age: 53
Discharge: HOME | End: 2018-05-14
Payer: COMMERCIAL

## 2018-05-14 PROCEDURE — 97112 NEUROMUSCULAR REEDUCATION: CPT

## 2018-05-14 PROCEDURE — 97110 THERAPEUTIC EXERCISES: CPT

## 2018-05-16 ENCOUNTER — HOSPITAL ENCOUNTER (OUTPATIENT)
Dept: PHYSICAL THERAPY | Age: 53
Discharge: HOME | End: 2018-05-16
Payer: COMMERCIAL

## 2018-05-16 PROCEDURE — 97112 NEUROMUSCULAR REEDUCATION: CPT

## 2018-05-16 PROCEDURE — 97110 THERAPEUTIC EXERCISES: CPT

## 2018-05-16 NOTE — PROGRESS NOTES
PT DAILY TREATMENT NOTE     Patient Name: Joseph Frances  Date:2018  : 1965  [x]  Patient  Verified  Payor: Herminio Ramirez / Plan: Cecilia Corbin / Product Type: HMO /    In time:1205  Out time:1238  Total Treatment Time (min): 33  Visit #: 4 of 8    Treatment Area: Low back pain [M54.5]  Lumbago with sciatica, right side [M54.41]    SUBJECTIVE  Pain Level (0-10 scale): 6  Any medication changes, allergies to medications, adverse drug reactions, diagnosis change, or new procedure performed?: [x] No    [] Yes (see summary sheet for update)  Subjective functional status/changes:   [] No changes reported  \"I feel about the same. \"    OBJECTIVE    Modality rationale: patient declined   Min Type Additional Details    [] Estim:  []Unatt       []IFC  []Premod                        []Other:  []w/ice   []w/heat  Position:  Location:    [] Estim: []Att    []TENS instruct  []NMES                    []Other:  []w/US   []w/ice   []w/heat  Position:  Location:    []  Traction: [] Cervical       []Lumbar                       [] Prone          []Supine                       []Intermittent   []Continuous Lbs:  [] before manual  [] after manual    []  Ultrasound: []Continuous   [] Pulsed                           []1MHz   []3MHz W/cm2:  Location:    []  Iontophoresis with dexamethasone         Location: [] Take home patch   [] In clinic    []  Ice     []  heat  []  Ice massage  []  Laser   []  Anodyne Position:  Location:    []  Laser with stim  []  Other:  Position:  Location:    []  Vasopneumatic Device Pressure:       [] lo [] med [] hi   Temperature: [] lo [] med [] hi   [] Skin assessment post-treatment:  []intact []redness- no adverse reaction    []redness  adverse reaction:     10 min Therapeutic Exercise:  [x] See flow sheet :   Rationale: increase ROM and increase strength to improve the patients ability to perform ADLs    23 min Neuromuscular Re-education:  [x]  See flow sheet : core stabilization activities    Rationale: increase ROM, increase strength, improve coordination, improve balance and increase proprioception  to improve the patients ability to improve mobility and upright posture        With   [x] TE   [] TA   [x] neuro   [] other: Patient Education: [x] Review HEP    [] Progressed/Changed HEP based on:   [x] positioning   [x] body mechanics   [] transfers   [] heat/ice application    [] other:      Other Objective/Functional Measures:      Pain Level (0-10 scale) post treatment: 7    ASSESSMENT/Changes in Function: Pt is making slow progress with her pain relief, but appears to be more fluid with her movements around the clinic. Still challenged with wall slides. Patient will continue to benefit from skilled PT services to modify and progress therapeutic interventions, address functional mobility deficits, address ROM deficits, address strength deficits, analyze and address soft tissue restrictions, analyze and cue movement patterns, analyze and modify body mechanics/ergonomics, assess and modify postural abnormalities, address imbalance/dizziness and instruct in home and community integration to attain remaining goals. [x]  See Plan of Care  []  See progress note/recertification  []  See Discharge Summary         Progress towards goals / Updated goals:  Short Term Goals: To be accomplished in 1 weeks:  1. Establish HEP for ROM & Strengthening. New Lydia be accomplished in 4 weeks:  1. Patient will be independent with HEP for ROM & Strengthening. Eval Status:na   Reports compliance  2. Pt will report decrease in pain rating on VAS to < 4/10 to improve ease with daily tasks. Eval Status:7/10   Pain has seen minimal changes   3. Pt will increase FOTO score to set d/c points to demonstrate increased ease with ADLs.     Eval Status: FOTO: reassess at nv   Assess at 30 day rosita  4.  Pt will report ability to walk >30 min without pain to improve functional mobility.     Eval Status:30 min but with pain                     No change to note    PLAN  []  Upgrade activities as tolerated     [x]  Continue plan of care  []  Update interventions per flow sheet       []  Discharge due to:_  []  Other:_      Mikala Cardoso PTA, CSCS 5/16/2018  12:45 PM    Future Appointments  Date Time Provider Bharat Nath   5/16/2018 12:00 PM Mikala Cardoso PTA [facility]   5/21/2018 12:00 PM Mikala Cardoso PTA [facility]   5/25/2018 12:00 PM Justin Villa, PT [facility]   5/29/2018 12:00 PM Nba Agarwal, PT [facility]   5/31/2018 12:00 PM Mikala Cardoso PTA [facility]   6/20/2018 4:30 PM NIMA [address] 57

## 2018-05-21 ENCOUNTER — HOSPITAL ENCOUNTER (OUTPATIENT)
Dept: PHYSICAL THERAPY | Age: 53
Discharge: HOME | End: 2018-05-21
Payer: COMMERCIAL

## 2018-05-21 PROCEDURE — 97112 NEUROMUSCULAR REEDUCATION: CPT

## 2018-05-21 PROCEDURE — 97110 THERAPEUTIC EXERCISES: CPT

## 2018-05-21 NOTE — PROGRESS NOTES
PT DAILY TREATMENT NOTE 12    Patient Name: Antolin Brown  Date:2018  : 1965  [x]  Patient  Verified  Payor: Uma Levels / Plan: Marce De La Rosa / Product Type: HMO /    In time:1206  Out time:1238  Total Treatment Time (min): 32  Visit #: 5 of 8    Treatment Area: Low back pain [M54.5]  Lumbago with sciatica, right side [M54.41]    SUBJECTIVE  Pain Level (0-10 scale): 6-7  Any medication changes, allergies to medications, adverse drug reactions, diagnosis change, or new procedure performed?: [x] No    [] Yes (see summary sheet for update)  Subjective functional status/changes:   [] No changes reported  \"My back still hurts. \"    OBJECTIVE    Modality rationale: decrease pain to improve the patients ability to improve mobility and positional tolerance   Min Type Additional Details    [] Estim:  []Unatt       []IFC  []Premod                        []Other:  []w/ice   []w/heat  Position:  Location:    [] Estim: []Att    []TENS instruct  []NMES                    []Other:  []w/US   []w/ice   []w/heat  Position:  Location:    []  Traction: [] Cervical       []Lumbar                       [] Prone          []Supine                       []Intermittent   []Continuous Lbs:  [] before manual  [] after manual    []  Ultrasound: []Continuous   [] Pulsed                           []1MHz   []3MHz W/cm2:  Location:    []  Iontophoresis with dexamethasone         Location: [] Take home patch   [] In clinic   5 []  Ice     [x]  heat  []  Ice massage  []  Laser   []  Anodyne Position: supine with wedge   Location: lower back     []  Laser with stim  []  Other:  Position:  Location:    []  Vasopneumatic Device Pressure:       [] lo [] med [] hi   Temperature: [] lo [] med [] hi   [x] Skin assessment post-treatment:  [x]intact []redness- no adverse reaction    []redness  adverse reaction:     10 min Therapeutic Exercise:  [x] See flow sheet :   Rationale: increase ROM and increase strength to improve the patients ability to perform ADLs    17 min Neuromuscular Re-education:  [x]  See flow sheet : core stabilization activities   Rationale: increase ROM, increase strength, improve coordination, improve balance and increase proprioception  to improve the patients ability to improve mobility and upright posture        With   [x] TE   [] TA   [x] neuro   [] other: Patient Education: [x] Review HEP    [] Progressed/Changed HEP based on:   [x] positioning   [x] body mechanics   [] transfers   [] heat/ice application    [] other:      Other Objective/Functional Measures:      Pain Level (0-10 scale) post treatment: 6-7    ASSESSMENT/Changes in Function: Pt is making slow progress with her pain relief. Still guarded with exercises and challenged with bed mobility. Patient will continue to benefit from skilled PT services to modify and progress therapeutic interventions, address functional mobility deficits, address ROM deficits, address strength deficits, analyze and address soft tissue restrictions, analyze and cue movement patterns, analyze and modify body mechanics/ergonomics, assess and modify postural abnormalities, address imbalance/dizziness and instruct in home and community integration to attain remaining goals. [x]  See Plan of Care  []  See progress note/recertification  []  See Discharge Summary         Progress towards goals / Updated goals:  Short Term Goals: To be accomplished in 1 weeks:  1. Establish HEP for ROM & Strengthening. New Lydia be accomplished in 4 weeks:  1. Patient will be independent with HEP for ROM & Strengthening. Eval Status:na   Reports compliance  2. Pt will report decrease in pain rating on VAS to < 4/10 to improve ease with daily tasks. Eval Status:7/10   Pain has seen minimal changes   3. Pt will increase FOTO score to set d/c points to demonstrate increased ease with ADLs.     Eval Status: FOTO: reassess at nv   Assess at 30 day rosita  4.  Pt will report ability to walk >30 min without pain to improve functional mobility.     Eval Status:30 min but with pain                     No change to note    PLAN  []  Upgrade activities as tolerated     [x]  Continue plan of care  []  Update interventions per flow sheet       []  Discharge due to:_  []  Other:_      Tobi Gaines PTA, [facility] 5/21/2018  12:50 PM    Future Appointments  Date Time Provider Bharat Nath   5/25/2018 12:00 PM Ronny Vargas [facility]   5/29/2018 12:00 PM Deb Villaseñor PT [facility]   5/31/2018 12:00 PM Tobi Gaines PTA [facility]   6/20/2018 4:30 PM NIMA Durán 57

## 2018-05-23 ENCOUNTER — APPOINTMENT (OUTPATIENT)
Dept: PHYSICAL THERAPY | Age: 53
End: 2018-05-23
Payer: COMMERCIAL

## 2018-05-25 ENCOUNTER — HOSPITAL ENCOUNTER (OUTPATIENT)
Dept: PHYSICAL THERAPY | Age: 53
Discharge: HOME | End: 2018-05-25
Payer: COMMERCIAL

## 2018-05-25 PROCEDURE — 97110 THERAPEUTIC EXERCISES: CPT | Performed by: PHYSICAL THERAPIST

## 2018-05-25 NOTE — PROGRESS NOTES
PT DAILY TREATMENT NOTE 12-16    Patient Name: Damon Bullock  Date:2018  : 1965  [x]  Patient  Verified  Payor: Lanier Rubinstein / Plan: Yobani Araiza / Product Type: HMO /    In time:1213  Out time:1234  Total Treatment Time (min): 21  Visit #: 6 of 8    Treatment Area: Low back pain [M54.5]  Lumbago with sciatica, right side [M54.41]    SUBJECTIVE  Pain Level (0-10 scale): 7  Any medication changes, allergies to medications, adverse drug reactions, diagnosis change, or new procedure performed?: [x] No    [] Yes (see summary sheet for update)  Subjective functional status/changes:   [] No changes reported  \"I'm hurting a little more today. I have to leave at 1230 to  my lunch and get back to work. \"    OBJECTIVE    21 min Therapeutic Exercise:  [] See flow sheet :   Rationale: increase ROM, increase strength, improve coordination and increase proprioception to improve the patients ability to tolerate daily tasks with reduced pain and improved stability/mobility. With   [] TE   [] TA   [] neuro   [] other: Patient Education: [x] Review HEP    [] Progressed/Changed HEP based on:   [] positioning   [] body mechanics   [] transfers   [] heat/ice application    [] other:      Other Objective/Functional Measures:      Pain Level (0-10 scale) post treatment: 6    ASSESSMENT/Changes in Function: Pt notes slight pain reduction post therapy today. Several exercises are held secondary to time constraints; will resume next visit as able. Patient will continue to benefit from skilled PT services to modify and progress therapeutic interventions, address functional mobility deficits, address ROM deficits, address strength deficits, analyze and address soft tissue restrictions, analyze and cue movement patterns, analyze and modify body mechanics/ergonomics and assess and modify postural abnormalities to attain remaining goals.             Progress towards goals / Updated goals:  Short Term Goals: To be accomplished in 1 weeks:  1. Establish HEP for ROM & Strengthening. New Lydia be accomplished in 4 weeks:  1. Patient will be independent with HEP for ROM & Strengthening. Eval Status:na                        Reports compliance  2. Pt will report decrease in pain rating on VAS to < 4/10 to improve ease with daily tasks. Eval Status:7/10                        Pain has seen minimal changes   3. Pt will increase FOTO score to set d/c points to demonstrate increased ease with ADLs.                          Eval Status: FOTO: reassess at nv                        Assess at 30 day rosita  4. Pt will report ability to walk >30 min without pain to improve functional mobility.                          Eval Status:30 min but with pain                                          No change to note    PLAN  [x]  Upgrade activities as tolerated     [x]  Continue plan of care  []  Update interventions per flow sheet       []  Discharge due to:_  []  Other:_      Spencer Cannon, PT 5/25/2018  1:20 PM    Future Appointments  Date Time Provider Bharat Taii   5/29/2018 12:00 PM Al Yeager, PT [facility]   5/31/2018 12:00 PM Markell Padron PTA [facility]   6/20/2018 4:30 PM NIMA Stephens

## 2018-05-29 ENCOUNTER — HOSPITAL ENCOUNTER (OUTPATIENT)
Dept: PHYSICAL THERAPY | Age: 53
Discharge: HOME | End: 2018-05-29
Payer: COMMERCIAL

## 2018-05-29 PROCEDURE — 97110 THERAPEUTIC EXERCISES: CPT

## 2018-05-29 PROCEDURE — 97112 NEUROMUSCULAR REEDUCATION: CPT

## 2018-05-29 NOTE — PROGRESS NOTES
PT DAILY TREATMENT NOTE     Patient Name: Mei Martinez  Date:2018  : 1965  [x]  Patient  Verified  Payor: Hadley Epley / Plan: Jay [facility] / Product Type: HMO /    In time:1208  Out time:1240  Total Treatment Time (min): 32  Visit #: 7 of 8    Treatment Area: Low back pain [M54.5]  Lumbago with sciatica, right side [M54.41]    SUBJECTIVE  Pain Level (0-10 scale): 9  Any medication changes, allergies to medications, adverse drug reactions, diagnosis change, or new procedure performed?: [x] No    [] Yes (see summary sheet for update)  Subjective functional status/changes:   [] No changes reported  \"I'm in a lot of pain over the weekend. \"    OBJECTIVE    Modality rationale: decrease pain and increase tissue extensibility to improve the patients ability to perform ADLs.     Min Type Additional Details    [] Estim:  []Unatt       []IFC  []Premod                        []Other:  []w/ice   []w/heat  Position:  Location:    [] Estim: []Att    []TENS instruct  []NMES                    []Other:  []w/US   []w/ice   []w/heat  Position:  Location:    []  Traction: [] Cervical       []Lumbar                       [] Prone          []Supine                       []Intermittent   []Continuous Lbs:  [] before manual  [] after manual    []  Ultrasound: []Continuous   [] Pulsed                           []1MHz   []3MHz W/cm2:  Location:    []  Iontophoresis with dexamethasone         Location: [] Take home patch   [] In clinic   10 []  Ice     [x]  heat  []  Ice massage  []  Laser   []  Anodyne Position:supine  Location:low back    []  Laser with stim  []  Other:  Position:  Location:    []  Vasopneumatic Device Pressure:       [] lo [] med [] hi   Temperature: [] lo [] med [] hi   [x] Skin assessment post-treatment:  [x]intact []redness- no adverse reaction    []redness  adverse reaction:       10 min Therapeutic Exercise:  [x] See flow sheet :   Rationale: increase ROM, increase strength and improve coordination to improve the patients ability to perform ADls. 12 min Neuromuscular Re-education:  [x]  See flow sheet :core stabilization activities   Rationale: increase ROM, increase strength, improve coordination, improve balance and increase proprioception  to improve the patients ease with mobility. With   [] TE   [] TA   [] neuro   [] other: Patient Education: [x] Review HEP    [] Progressed/Changed HEP based on:   [] positioning   [] body mechanics   [] transfers   [] heat/ice application    [] other:      Other Objective/Functional Measures:      Pain Level (0-10 scale) post treatment: 7    ASSESSMENT/Changes in Function: Ms. Cheron Boeck has poor tolerance to exercise and general mobility today. Declined to perform some activities and held some exercises per flow sheet. Patient will continue to benefit from skilled PT services to modify and progress therapeutic interventions, address functional mobility deficits, address ROM deficits, address strength deficits, analyze and address soft tissue restrictions, analyze and cue movement patterns, analyze and modify body mechanics/ergonomics, assess and modify postural abnormalities, address imbalance/dizziness and instruct in home and community integration to attain remaining goals. []  See Plan of Care  []  See progress note/recertification  []  See Discharge Summary         Progress towards goals / Updated goals:  Short Term Goals: To be accomplished in 1 weeks:  1. Establish HEP for ROM & Strengthening.                        YOK   [address] [facility] be accomplished in 4 weeks:  1. Patient will be independent with HEP for ROM & Strengthening.                        Eval Status:na                        Reports compliance  2. Pt will report decrease in pain rating on VAS to < 4/10 to improve ease with daily tasks.                         Eval Status:7/10                        Pain has seen minimal changes   3.  Pt will increase FOTO score to set d/c points to demonstrate increased ease with ADLs.                          Eval Status: FOTO: reassess at nv                        Assess at 30 day rosita  4. Pt will report ability to walk >30 min without pain to improve functional mobility.                          Eval Status:30 min but with pain                                          No change to note       PLAN  []  Upgrade activities as tolerated     [x]  Continue plan of care  []  Update interventions per flow sheet       []  Discharge due to:_  []  Other:_      Nat Werner, PT 5/29/2018  12:11 PM    Future Appointments  Date Time Provider Bharat Nath   5/31/2018 12:00 PM Yoly Foster PTA [facility]   6/20/2018 4:30 PM Haydee Giron [address]

## 2018-05-30 ENCOUNTER — APPOINTMENT (OUTPATIENT)
Dept: PHYSICAL THERAPY | Age: 53
End: 2018-05-30
Payer: COMMERCIAL

## 2018-05-31 ENCOUNTER — HOSPITAL ENCOUNTER (OUTPATIENT)
Dept: PHYSICAL THERAPY | Age: 53
Discharge: HOME | End: 2018-05-31
Payer: COMMERCIAL

## 2018-05-31 PROCEDURE — 97110 THERAPEUTIC EXERCISES: CPT | Performed by: PHYSICAL THERAPIST

## 2018-05-31 NOTE — PROGRESS NOTES
PT DISCHARGE DAILY NOTE AND [id number]    Date:2018  Patient name: Nunu Shepard Start of Care: 2018   Referral source: Rey Munoz MD : 1965                          Medical Diagnosis: Low back pain [M54.5]  Lumbago with sciatica, right side [M54.41] Onset Date:18                          Treatment Diagnosis: lumbar radiculopathy   Prior Hospitalization: see medical history Provider#: [id number]   Medications: Verified on Patient summary List    Comorbidities: HTN, asthma   Prior Level of Function: works at [address]  Functionally independent. Has recently started back at gym    Visits from [facility]: 8    Missed Visits: 0    Reporting Period : 18 to 18    [x]  Patient  Verified  Payor: Alicia Pardo / Plan: Jose Pair / Product Type: HMO /    In time:1208  Out time:1230  Total Treatment Time (min): 32  Visit #: 8 of 8    SUBJECTIVE  Pain Level (0-10 scale): 7  Any medication changes, allergies to medications, adverse drug reactions, diagnosis change, or new procedure performed?: [x] No    [] Yes (see summary sheet for update)  Subjective functional status/changes:   [] No changes reported  \"My pain is still elevated, but it is lower than on Tuesday. \"    OBJECTIVE    Modality rationale: decrease inflammation and decrease pain to improve the patients ability to tolerate WB tasks.    Min Type Additional Details    [] Estim:  []Unatt       []IFC  []Premod                        []Other:  []w/ice   []w/heat  Position:  Location:    [] Estim: []Att    []TENS instruct  []NMES                    []Other:  []w/US   []w/ice   []w/heat  Position:  Location:    []  Traction: [] Cervical       []Lumbar                       [] Prone          []Supine                       []Intermittent   []Continuous Lbs:  [] before manual  [] after manual    []  Ultrasound: []Continuous   [] Pulsed                           []1MHz   []3MHz W/cm2:  Location:    [] Iontophoresis with dexamethasone         Location: [] Take home patch   [] In clinic   5 []  Ice     [x]  heat  []  Ice massage  []  Laser   []  Anodyne Position: seated  Location: LB    []  Laser with stim  []  Other:  Position:  Location:    []  Vasopneumatic Device Pressure:       [] lo [] med [] hi   Temperature: [] lo [] med [] hi   [x] Skin assessment post-treatment:  [x]intact []redness- no adverse reaction    []redness  adverse reaction:     28 min Therapeutic Exercise:  [] See flow sheet :   Rationale: increase ROM, increase strength, improve coordination, improve balance and increase proprioception to improve the patients ability to tolerate daily tasks with reduced pain. With   [] TE   [] TA   [] neuro   [] other: Patient Education: [x] Review HEP    [] Progressed/Changed HEP based on:   [] positioning   [] body mechanics   [] transfers   [] heat/ice application    [] other:      Other Objective/Functional Measures: See updated goals. Pain Level (0-10 scale) post treatment: 6    Summary of Care:  Short Term Goals: To be accomplished in 1 weeks:  1. Establish HEP for ROM & Strengthening.                        [facility]   [address] [place] [address], [address].. be accomplished in 4 weeks:  1. Patient will be independent with HEP for ROM & Strengthening.                        Eval Status:na                        MET: Reports compliance  2. Pt will report decrease in pain rating on VAS to < 4/10 to improve ease with daily tasks.                         Eval Status:7/10                        NOT MET: pain average is 5-6/10   3. Pt will increase FOTO score to set d/c points to demonstrate increased ease with ADLs.                          Eval Status: FOTO: reassess at nv                        FOTO score: 44  4. Pt will report ability to walk >30 min without pain to improve functional mobility.                          Eval Status:30 min but with pain                                          NOT MET: pt reports ambulation >5 mintues increases her pain     Functional Gains: \"Even though the pain is still there, but I feel that my pain has reduced overall. My pain is now centralized in my low back, no longer in my hips. \"  Functional Deficits: \"The pain remains an issue. \"  % improvement: 20-30%  Pain   Average: 5-6/10       Best: 5-6/10     Worst: 9/10  Patient Goal: \"I will still do my exercises and hope that my pain will go away permanently. \"      ASSESSMENT/Changes in Function: Ms. Kamla Lyons has been a pleasure to treat during her bout of therapy. She notes a 20-30% improvement in her symptoms since beginning therapy. She notes overall improved pain and notes that her B hip pain has reduced, but she still has central LB, which has limited her participation in therapy. At this time, we are discharging to an updated HEP secondary to lack of appreciable progress towards goals. Pt reports a follow up with her physician on 6/20/18.     Thank you for this referral!      PLAN  [x]Discontinue therapy: []Patient has reached or is progressing toward set goals      []Patient is non-compliant or has abdicated      [x]Due to lack of appreciable progress towards set goals    Misha Warren, PT 5/31/2018  12:11 PM

## 2018-08-11 ENCOUNTER — HOSPITAL ENCOUNTER (OUTPATIENT)
Dept: LAB | Age: 53
Discharge: HOME | End: 2018-08-11

## 2018-08-11 PROCEDURE — 99001 SPECIMEN HANDLING PT-LAB: CPT | Performed by: PHYSICIAN ASSISTANT

## 2018-08-12 LAB
CHOLEST SERPL-MCNC: 142 MG/DL (ref 100–199)
HDLC SERPL-MCNC: 39 MG/DL
INTERPRETATION, 910389: NORMAL
LDLC SERPL CALC-MCNC: 90 MG/DL (ref 0–99)
TRIGL SERPL-MCNC: 64 MG/DL (ref 0–149)
VLDLC SERPL CALC-MCNC: 13 MG/DL (ref 5–40)

## 2018-08-16 ENCOUNTER — OFFICE VISIT (OUTPATIENT)
Dept: FAMILY MEDICINE CLINIC | Age: 53
End: 2018-08-16

## 2018-08-16 VITALS
RESPIRATION RATE: 12 BRPM | BODY MASS INDEX: 43.66 KG/M2 | DIASTOLIC BLOOD PRESSURE: 62 MMHG | OXYGEN SATURATION: 98 % | WEIGHT: 222.4 LBS | SYSTOLIC BLOOD PRESSURE: 120 MMHG | HEIGHT: 60 IN | HEART RATE: 85 BPM | TEMPERATURE: 99.4 F

## 2018-08-16 DIAGNOSIS — H91.93 BILATERAL HEARING LOSS, UNSPECIFIED HEARING LOSS TYPE: ICD-10-CM

## 2018-08-16 DIAGNOSIS — I10 ESSENTIAL HYPERTENSION: Primary | ICD-10-CM

## 2018-08-16 DIAGNOSIS — E55.9 VITAMIN D DEFICIENCY: ICD-10-CM

## 2018-08-16 DIAGNOSIS — Z12.39 SCREENING FOR BREAST CANCER: ICD-10-CM

## 2018-08-16 DIAGNOSIS — R79.89 ELEVATED TSH: ICD-10-CM

## 2018-08-16 DIAGNOSIS — M54.50 CHRONIC BILATERAL LOW BACK PAIN WITHOUT SCIATICA: ICD-10-CM

## 2018-08-16 DIAGNOSIS — E78.5 HYPERLIPIDEMIA, UNSPECIFIED HYPERLIPIDEMIA TYPE: ICD-10-CM

## 2018-08-16 DIAGNOSIS — G89.29 CHRONIC BILATERAL LOW BACK PAIN WITHOUT SCIATICA: ICD-10-CM

## 2018-08-16 RX ORDER — LISINOPRIL AND HYDROCHLOROTHIAZIDE 10; 12.5 MG/1; MG/1
1 TABLET ORAL DAILY
Qty: 30 TAB | Refills: 5 | Status: SHIPPED | OUTPATIENT
Start: 2018-08-16 | End: 2019-05-21 | Stop reason: SDUPTHER

## 2018-08-16 NOTE — PROGRESS NOTES
Patient: Thaddeus Zavaleta MRN: [id number]  SSN: [id number]    YOB: 1965  Age: 48 y.o. Sex: female      Date of Service: 8/16/2018   Provider: NIMA Edward   Office Location:   [address] [place] [address] Dr Julien [address]averas[address], [address]  Office Phone: [phone]  Office Fax: [phone]      REASON FOR VISIT:   Chief Complaint   Patient presents with    Hypertension    Cholesterol Problem    Back Pain    Medication Refill        VITALS:   Visit Vitals    /62 (BP 1 Location: Right arm, BP Patient Position: Sitting)  Comment: manual    Pulse 85    Temp 99.4 °F (37.4 °C) (Oral)    Resp 12    Ht 5' (1.524 m)    Wt 222 lb 6.4 oz (100.9 kg)    LMP 06/01/2017  Comment: patient had spotting tues and wed    SpO2 98%    BMI 43.43 kg/m2        MEDICATIONS:   Current Outpatient Prescriptions on File Prior to Visit   Medication Sig Dispense Refill    fluticasone-salmeterol (ADVAIR) 100-50 mcg/dose diskus inhaler Take 1 Puff by inhalation every twelve (12) hours. 1 Inhaler 5    albuterol (PROVENTIL HFA, VENTOLIN HFA, PROAIR HFA) 90 mcg/actuation inhaler Take 2 Puffs by inhalation every four (4) hours as needed for Wheezing. 1 Inhaler 5    mometasone (NASONEX) 50 mcg/actuation nasal spray 2 Sprays by Both Nostrils route daily. 1 Container 0    aspirin 81 mg chewable tablet Take 1 Tab by mouth daily. 30 Tab 0    Omega-3 Fatty Acids (FISH OIL) 300 mg cap Take  by mouth. No current facility-administered medications on file prior to visit. ALLERGIES:   Allergies   Allergen Reactions    Motrin [Ibuprofen] Other (comments)     Directed not to take by physician. MEDICAL/SURGICAL HISTORY:  Past Medical History:   Diagnosis Date    Acute shoulder pain due to trauma 5/17/2016    Allergic rhinitis 11/9/2009    Asthma 2015    PFT confirmed    CAD (coronary artery disease)     hx mi-2013 ?     Carpal tunnel syndrome 11/9/2009    Chronic low back pain without sciatica 5/17/2016    Excessive use of nonsteroidal anti-inflammatory drug (NSAID) 5/17/2016    Fibroid, uterine 11/9/2009    GERD (gastroesophageal reflux disease) 11/9/2009    Heart failure ([address]     heart attack 3 years ago    HLD (hyperlipidemia) 11/9/2009    Hypertension     Hypertriglyceridemia 5/17/2016    Hypothyroidism 5/17/2016    Knee pain, right     SOB (shortness of breath)     Weight gain 5/17/2016      Past Surgical History:   Procedure Laterality Date    COLONOSCOPY N/A 6/1/2017    COLONOSCOPY with Polypectomy performed by Heber Atkinson MD at [address] HX BREAST BIOPSY      benign, left    HX GYN      fibroids removed    HX MYOMECTOMY      open first, then embolization 2nd time.  HX OTHER SURGICAL      cyst removed    HX TONSILLECTOMY      SC COLONOSCOPY W/BIOPSY SINGLE/MULTIPLE N/A 06/01/2017    Dr. Agata Serra:  Family History   Problem Relation Age of Onset    Hypertension Mother     Stroke Father      mini strokes, not sure of number    Hypertension Father     Seizures Father     Cancer Maternal Grandfather      not sure type        SOCIAL HISTORY:  Social History   Substance Use Topics    Smoking status: Never Smoker    Smokeless tobacco: Never Used    Alcohol use No             HISTORY OF PRESENT ILLNESS: Rafael Zavaleta is a 48 y.o. female who presents to the office for a routine follow up visit. Hypertension -   Well controlled   Reports compliance with the following regimen: lisinopril-HCTZ 10-12.5 mg daily  Home BP readings: not checking. Lifestyle modifications: DASH diet       Hyperlipidemia -   In the setting of known CAD - patient had MI several years ago   Patient has been resistant to statin therapy despite her CV risk, and I had told her at her last visit that if she could get her LDL under 100 with diet and exercise, then she did not need to take the statin.  She has been able to drop her LDL from 148 in March to 90 on 8/11.      Low Back Pain -   Patient continues to complain of bilateral low back pain. The pain usually affects the right or left side, but it changes sporadically. Fortunately, she is no longer having sciatica/radicular pain. Her back, however, only seems to be getting worse. She completed physical therapy in May and did not find it to be very helpful. She has tried NSAIDs, muscle relaxers, and a home exercise program without relief. She continues to try to lose weight, but admits exercise is difficult when her back is bothering her.      Hearing Loss -   Patient request a referral to an audiologist to have her hearing tested. REVIEW OF SYSTEMS:  Review of Systems   Constitutional: Negative for chills, fever and malaise/fatigue. HENT: Positive for hearing loss. Negative for tinnitus. Respiratory: Negative for cough, shortness of breath and wheezing. Cardiovascular: Negative for chest pain, palpitations and leg swelling. Gastrointestinal: Negative for abdominal pain, constipation, diarrhea, nausea and vomiting. Musculoskeletal: Positive for back pain. Neurological: Negative for dizziness and headaches. PHYSICAL EXAMINATION:  Physical Exam   Constitutional: She is oriented to person, place, and time and well-developed, well-nourished, and in no distress. HENT:   Head: Normocephalic and atraumatic. Right Ear: Tympanic membrane and external ear normal.   Left Ear: Tympanic membrane and external ear normal.   canals with dry, flaky skin and minimal cerumen b/l. No cerumen impaction. Eyes: Conjunctivae are normal. Pupils are equal, round, and reactive to light. Right eye exhibits no discharge. Left eye exhibits no discharge. Cardiovascular: Normal rate, regular rhythm and normal heart sounds. Exam reveals no gallop and no friction rub. No murmur heard. Pulmonary/Chest: Effort normal and breath sounds normal. She has no wheezes.  She has no rales.   Neurological: She is alert and oriented to person, place, and time. Gait normal.   Skin: Skin is warm and dry. No rash noted. Psychiatric: Mood, memory and affect normal.        RESULTS:  No results found for this visit on 08/16/18. ASSESSMENT/PLAN:  Diagnoses and all orders for this visit:    1. Essential hypertension  - BP well controlled   - Meds refilled  Orders:    -     lisinopril-hydroCHLOROthiazide (PRINZIDE, ZESTORETIC) 10-12.5 mg per tablet; Take 1 Tab by mouth daily.  -     METABOLIC PANEL, COMPREHENSIVE  -     LIPID PANEL; Future    2. Chronic bilateral low back pain without sciatica  - Not responding to conservative therapy  - Will refer to [facility]   - Check x-ray today and consider MRI in the future  Orders:    -     XR SPINE LUMB 2 OR 3 V; Future  -     [facility]    3. Bilateral hearing loss, unspecified hearing loss type  - Referral ordered at patient's request  Orders:    -     REFERRAL TO AUDIOLOGY    4. Hyperlipidemia, unspecified hyperlipidemia type  - Congratulated patient on her efforts  - We have discussed the risks and benefits of statin therapy, and given her history of CAD, I still think that it would be of some benefit to her, but she is very concerned about potential risk of Alzheimer's  - We will continue to monitor closely. She should aim to keep her LDL < 100  Orders:    -     METABOLIC PANEL, COMPREHENSIVE  -     LIPID PANEL; Future    5. Vitamin D deficiency  - Check labs  Orders:    -     VITAMIN D, 25 HYDROXY    6. Elevated TSH  - Check labs  Orders:    -     TSH 3RD GENERATION; Future    7. Screening for breast cancer  - Overdue for screening mammogram  - Orders placed  Orders:    -     MARCELLUS MAMMO BI SCREENING INCL CAD; Future    8. BMI 40.0-44.9, adult ([facility] [address]  Orders:    -     HEMOGLOBIN A1C W/O EAG; Future      Follow-up Disposition:  Return in about 6 months (around 2/16/2019) for fasting labs 1 week prior. .       All questions answered.  Patient expresses understanding and agrees with the plan as detailed above.     PATIENT CARE TEAM:   Patient Care Team:  NIMA Phillips as PCP - 23 Cecilia Mortensen MD (General Surgery)       Patty Thompson, [place]   August 16, 2018    4:04 PM

## 2018-08-16 NOTE — PROGRESS NOTES
Chief Complaint   Patient presents with    Hypertension    Cholesterol Problem    Back Pain    Medication Refill     Visit Vitals    /62 (BP 1 Location: Right arm, BP Patient Position: Sitting)    Pulse 85    Temp 99.4 °F (37.4 °C) (Oral)    Resp 12    Ht 5' (1.524 m)    Wt 222 lb 6.4 oz (100.9 kg)    SpO2 98%    BMI 43.43 kg/m2     Patient is not fasting. Patient in room # 6. Patient would like to discuss finger problem. 1. Have you been to the ER, urgent care clinic since your last visit? Hospitalized since your last visit? No    2. Have you seen or consulted any other health care providers outside of the [address] [place] [address] since your last visit? Include any pap smears or colon screening. No     Reviewed. Mammogram due  Requested Prescriptions     Pending Prescriptions Disp Refills    lisinopril-hydroCHLOROthiazide (PRINZIDE, ZESTORETIC) 10-12.5 mg per tablet 30 Tab 5     Sig: Take 1 Tab by mouth daily.

## 2018-08-16 NOTE — PATIENT INSTRUCTIONS

## 2018-08-16 NOTE — MR AVS SNAPSHOT
[facility] [address] [place] [address] 
[phone] Patient: Edgar Lehman MRN: [id number] OWM:[id number] Visit Information Date & Time Provider Department Dept. Phone Encounter #  
 8/16/2018  3:00 PM Sumit Mosquera, [address] [facility] [id number] Follow-up Instructions Return in about 6 months (around 2/16/2019) for fasting labs 1 week prior. [facility] Upcoming Health Maintenance Date Due Pneumococcal 19-64 Medium Risk (1 of 1 - PPSV23) 4/2/1984 DTaP/Tdap/Td series (1 - Tdap) 4/2/1986 BREAST CANCER SCRN MAMMOGRAM 1/18/2018 Influenza Age 5 to Adult 8/1/2018 PAP AKA CERVICAL CYTOLOGY 1/13/2020 COLONOSCOPY 6/1/2022 Allergies as of 8/16/2018  Review Complete On: 8/16/2018 By: NIMA Wynn Severity Noted Reaction Type Reactions Motrin [Ibuprofen]  02/09/2018    Other (comments) Directed not to take by physician. Current Immunizations  Never Reviewed No immunizations on file. Not reviewed this visit You Were Diagnosed With   
  
 Codes Comments Essential hypertension    -  Primary ICD-10-CM: I10 
ICD-9-CM: 401.9 Chronic bilateral low back pain without sciatica     ICD-10-CM: M54.5, G89.29 ICD-9-CM: 724.2, 338.29 Screening for breast cancer     ICD-10-CM: Z12.31 
ICD-9-CM: V76.10 Vitamin D deficiency     ICD-10-CM: E55.9 ICD-9-CM: 268.9 BMI 40.0-44.9, adult [facility])     ICD-10-CM: Z68.41 
ICD-9-CM: V85.41 Hyperlipidemia, unspecified hyperlipidemia type     ICD-10-CM: E78.5 ICD-9-CM: 272.4 Elevated TSH     ICD-10-CM: R94.6 ICD-9-CM: 794.5 Bilateral hearing loss, unspecified hearing loss type     ICD-10-CM: H91.93 
ICD-9-CM: 389. 9 Vitals BP Pulse Temp Resp Height(growth percentile) Weight(growth percentile)  120/62 (BP 1 Location: Right arm, BP Patient Position: Sitting) 85 99.4 °F (37.4 °C) (Oral) 12 5' (1.524 m) 222 lb 6.4 oz (100.9 kg) LMP SpO2 BMI OB Status Smoking Status 06/01/2017 98% 43.43 kg/m2 Postmenopausal Never Smoker Vitals History BMI and BSA Data Body Mass Index Body Surface Area  
 43.43 kg/m 2 2.07 m 2 Preferred Pharmacy Pharmacy Name Phone [facility] [address] [place] [address]. [phone] Your Updated Medication List  
  
   
This list is accurate as of 8/16/18  3:50 PM.  Always use your most recent med list.  
  
  
  
  
 albuterol 90 mcg/actuation inhaler Commonly known as:  PROVENTIL HFA, VENTOLIN HFA, PROAIR HFA Take 2 Puffs by inhalation every four (4) hours as needed for Wheezing. aspirin 81 mg chewable tablet Take 1 Tab by mouth daily. FISH  mg Cap Generic drug:  Omega-3 Fatty Acids Take  by mouth. fluticasone-salmeterol 100-50 mcg/dose diskus inhaler Commonly known as:  ADVAIR Take 1 Puff by inhalation every twelve (12) hours. lisinopril-hydroCHLOROthiazide 10-12.5 mg per tablet Commonly known as:  [place] Spark Take 1 Tab by mouth daily. mometasone 50 mcg/actuation nasal spray Commonly known as:  NASONEX  
2 Sprays by Both Nostrils route daily. Prescriptions Sent to Pharmacy Refills  
 lisinopril-hydroCHLOROthiazide (PRINZIDE, ZESTORETIC) 10-12.5 mg per tablet 5 Sig: Take 1 Tab by mouth daily. Class: Normal  
 Pharmacy: [address] . Ph #: [phone] Route: Oral  
  
We Performed the Following METABOLIC PANEL, COMPREHENSIVE [30113 CPT(R)] REFERRAL TO AUDIOLOGY [REF7 Custom] Comments:  
 Patient requests hearing test  
 REFERRAL TO ORTHOPEDICS [IVG615 Custom] VITAMIN D, 25 HYDROXY [id number] CPT(R)] Follow-up Instructions Return in about 6 months (around 2/16/2019) for fasting labs 1 week prior. [place] To-Do List   
 08/16/2018 Lab:  HEMOGLOBIN A1C W/O EAG   
  
 08/16/2018 Imaging:  MARCELLUS MAMMO BI SCREENING INCL CAD   
  
 08/16/2018 Imaging:  XR SPINE LUMB 2 OR 3 V Around 08/17/2018 Lab:  LIPID PANEL Around 08/17/2018 Lab:  TSH 3RD GENERATION Referral Information Referral ID Referred By Referred To  
  
 [id number] Myah Jett 127 MD Oanh FelixYamilka [address] Phone: [phone] Fax: [phone] Visits Status Start Date End Date 1 New Request 8/16/18 8/16/19 If your referral has a status of pending review or denied, additional information will be sent to support the outcome of this decision. Referral ID Referred By Referred To  
 [id number] Miguel SCHWARTZ Not Available Visits Status Start Date End Date 1 New Request 8/16/18 8/16/19 If your referral has a status of pending review or denied, additional information will be sent to support the outcome of this decision. Patient Instructions A Healthy Lifestyle: Care Instructions Your Care Instructions A healthy lifestyle can help you feel good, stay at a healthy weight, and have plenty of energy for both work and play. A healthy lifestyle is something you can share with your whole family. A healthy lifestyle also can lower your risk for serious health problems, such as high blood pressure, heart disease, and diabetes. You can follow a few steps listed below to improve your health and the health of your family. Follow-up care is a key part of your treatment and safety. Be sure to make and go to all appointments, and call your doctor if you are having problems. It's also a good idea to know your test results and keep a list of the medicines you take. How can you care for yourself at home? · Do not eat too much sugar, fat, or fast foods. You can still have dessert and treats now and then. The goal is moderation. · Start small to improve your eating habits. Pay attention to portion sizes, drink less juice and soda pop, and eat more fruits and vegetables. ¨ Eat a healthy amount of food. A 3-ounce serving of meat, for example, is about the size of a deck of cards. Fill the rest of your plate with vegetables and whole grains. ¨ Limit the amount of soda and sports drinks you have every day. Drink more water when you are thirsty. ¨ Eat at least 5 servings of fruits and vegetables every day. It may seem like a lot, but it is not hard to reach this goal. A serving or helping is 1 piece of fruit, 1 cup of vegetables, or 2 cups of leafy, raw vegetables. Have an apple or some carrot sticks as an afternoon snack instead of a candy bar. Try to have fruits and/or vegetables at every meal. 
· Make exercise part of your daily routine. You may want to start with simple activities, such as walking, bicycling, or slow swimming. Try to be active 30 to 60 minutes every day. You do not need to do all 30 to 60 minutes all at once. For example, you can exercise 3 times a day for 10 or 20 minutes. Moderate exercise is safe for most people, but it is always a good idea to talk to your doctor before starting an exercise program. 
· Keep moving. Janeen Dove the lawn, work in the garden, or [employer]. Take the stairs instead of the elevator at work. · If you smoke, quit. People who smoke have an increased risk for heart attack, stroke, cancer, and other lung illnesses. Quitting is hard, but there are ways to boost your chance of quitting tobacco for good. ¨ Use nicotine gum, patches, or lozenges. ¨ Ask your doctor about stop-smoking programs and medicines. ¨ Keep trying. In addition to reducing your risk of diseases in the future, you will notice some benefits soon after you stop using tobacco. If you have shortness of breath or asthma symptoms, they will likely get better within a few weeks after you quit. · Limit how much alcohol you drink. Moderate amounts of alcohol (up to 2 drinks a day for men, 1 drink a day for women) are okay. But drinking too much can lead to liver problems, high blood pressure, and other health problems. Family health If you have a family, there are many things you can do together to improve your health. · Eat meals together as a family as often as possible. · Eat healthy foods. This includes fruits, vegetables, lean meats and dairy, and whole grains. · Include your family in your fitness plan. Most people think of activities such as jogging or tennis as the way to fitness, but there are many ways you and your family can be more active. Anything that makes you breathe hard and gets your heart pumping is exercise. Here are some tips: 
¨ Walk to do errands or to take your child to school or the bus. ¨ Go for a family bike ride after dinner instead of watching TV. Where can you learn more? Go to http://yosef-steph.info/. Enter M353 in the search box to learn more about \"A Healthy Lifestyle: Care Instructions. \" Current as of: December 7, 2017 Content Version: 11.7 © 1833-8865 [employer]. Care instructions adapted under license by [employer] (which disclaims liability or warranty for this information). If you have questions about a medical condition or this instruction, always ask your healthcare professional. [address] any warranty or liability for your use of this information. Introducing [facility]! Renetta Oliver introduces ScoreBig patient portal. Now you can access parts of your medical record, email your doctor's office, and request medication refills online. 1. In your internet browser, go to [email or web address] 2. Click on the First Time User? Click Here link in the Sign In box. You will see the New Member Sign Up page. 3. Enter your Currensee Access Code exactly as it appears below. You will not need to use this code after youve completed the sign-up process. If you do not sign up before the expiration date, you must request a new code. · Currensee Access Code: [email or web address] Expires: 9/29/2018  5:20 AM 
 
4. Enter the last four digits of your Social Security Number (xxxx) and Date of Birth (mm/dd/yyyy) as indicated and click Submit. You will be taken to the next sign-up page. 5. Create a Currensee ID. This will be your Currensee login ID and cannot be changed, so think of one that is secure and easy to remember. 6. Create a Currensee password. You can change your password at any time. 7. Enter your Password Reset Question and Answer. This can be used at a later time if you forget your password. 8. Enter your e-mail address. You will receive e-mail notification when new information is available in [address]. 9. Click Sign Up. You can now view and download portions of your medical record. 10. Click the Download Summary menu link to download a portable copy of your medical information. If you have questions, please visit the Frequently Asked Questions section of the Currensee website. Remember, Currensee is NOT to be used for urgent needs. For medical emergencies, dial 911. Now available from your iPhone and Android! Please provide this summary of care documentation to your next provider. Your primary care clinician is listed as Duarte Galeano. If you have any questions after today's visit, please call [phone].

## 2018-08-17 ENCOUNTER — TELEPHONE (OUTPATIENT)
Dept: FAMILY MEDICINE CLINIC | Age: 53
End: 2018-08-17

## 2018-08-17 DIAGNOSIS — E78.5 HYPERLIPIDEMIA, UNSPECIFIED HYPERLIPIDEMIA TYPE: ICD-10-CM

## 2018-08-17 DIAGNOSIS — R79.89 ELEVATED TSH: ICD-10-CM

## 2018-08-17 DIAGNOSIS — I10 ESSENTIAL HYPERTENSION: ICD-10-CM

## 2018-08-17 NOTE — TELEPHONE ENCOUNTER
Called home number. Left message on answering machine. This call was concerning paperwork available for pick-up at .

## 2018-09-06 ENCOUNTER — HOSPITAL ENCOUNTER (OUTPATIENT)
Dept: GENERAL RADIOLOGY | Age: 53
Discharge: HOME | End: 2018-09-06
Payer: COMMERCIAL

## 2018-09-06 DIAGNOSIS — G89.29 CHRONIC BILATERAL LOW BACK PAIN WITHOUT SCIATICA: ICD-10-CM

## 2018-09-06 DIAGNOSIS — M54.50 CHRONIC BILATERAL LOW BACK PAIN WITHOUT SCIATICA: ICD-10-CM

## 2018-09-06 PROCEDURE — 72100 X-RAY EXAM L-S SPINE 2/3 VWS: CPT

## 2018-09-07 NOTE — PROGRESS NOTES
Please let patient know that her back x-ray shows arthritic and degenerative changes. I'd recommend following up with the spine group. Referral has been ordered, and it appears they've not been able to reach her. Please provide patient with [facility] contact info.

## 2018-09-10 ENCOUNTER — TELEPHONE (OUTPATIENT)
Dept: FAMILY MEDICINE CLINIC | Age: 53
End: 2018-09-10

## 2018-09-10 NOTE — TELEPHONE ENCOUNTER
Called home number [phone]. Called Mr. Paul Young number. Left message on answering machine to return the call. This call was concerning message below. ----- Message from NIMA Desai sent at 9/7/2018  9:38 AM EDT -----  Please let patient know that her back x-ray shows arthritic and degenerative changes. I'd recommend following up with the spine group. Referral has been ordered, and it appears they've not been able to reach her. Please provide patient with [place] contact info.

## 2018-09-11 NOTE — TELEPHONE ENCOUNTER
Called [phone] per spouse. Verified name and . Spoke with patient. Notified of message below Sagar HERRING. Patient stated have their number and will reach out to them on tomorrow. Patient had no further questions or concerns.

## 2018-10-03 ENCOUNTER — OFFICE VISIT (OUTPATIENT)
Dept: FAMILY MEDICINE CLINIC | Age: 53
End: 2018-10-03

## 2018-10-03 VITALS
DIASTOLIC BLOOD PRESSURE: 86 MMHG | HEART RATE: 83 BPM | HEIGHT: 60 IN | OXYGEN SATURATION: 97 % | SYSTOLIC BLOOD PRESSURE: 118 MMHG | RESPIRATION RATE: 18 BRPM | TEMPERATURE: 100.8 F | BODY MASS INDEX: 43.78 KG/M2 | WEIGHT: 223 LBS

## 2018-10-03 DIAGNOSIS — B96.89 ACUTE BACTERIAL SINUSITIS: Primary | ICD-10-CM

## 2018-10-03 DIAGNOSIS — J45.30 MILD PERSISTENT ASTHMA WITHOUT COMPLICATION: ICD-10-CM

## 2018-10-03 DIAGNOSIS — J01.90 ACUTE BACTERIAL SINUSITIS: Primary | ICD-10-CM

## 2018-10-03 RX ORDER — AMOXICILLIN AND CLAVULANATE POTASSIUM 875; 125 MG/1; MG/1
1 TABLET, FILM COATED ORAL 2 TIMES DAILY
Qty: 20 TAB | Refills: 0 | Status: SHIPPED | OUTPATIENT
Start: 2018-10-03 | End: 2018-10-13

## 2018-10-03 RX ORDER — FLUTICASONE PROPIONATE AND SALMETEROL 100; 50 UG/1; UG/1
1 POWDER RESPIRATORY (INHALATION) EVERY 12 HOURS
Qty: 1 INHALER | Refills: 5 | Status: SHIPPED | OUTPATIENT
Start: 2018-10-03 | End: 2019-08-21 | Stop reason: CLARIF

## 2018-10-03 NOTE — PROGRESS NOTES
1. Have you been to the ER, urgent care clinic since your last visit? Hospitalized since your last visit? No    2. Have you seen or consulted any other health care providers outside of the [address] [place] [address] since your last visit? Include any pap smears or colon screening.  No

## 2018-10-03 NOTE — PATIENT INSTRUCTIONS
Sinusitis: Care Instructions  Your Care Instructions    Sinusitis is an infection of the lining of the sinus cavities in your head. Sinusitis often follows a cold. It causes pain and pressure in your head and face. In most cases, sinusitis gets better on its own in 1 to 2 weeks. But some mild symptoms may last for several weeks. Sometimes antibiotics are needed. Follow-up care is a key part of your treatment and safety. Be sure to make and go to all appointments, and call your doctor if you are having problems. It's also a good idea to know your test results and keep a list of the medicines you take. How can you care for yourself at home? · Take an over-the-counter pain medicine, such as acetaminophen (Tylenol), ibuprofen (Advil, Motrin), or naproxen (Aleve). Read and follow all instructions on the label. · If the doctor prescribed antibiotics, take them as directed. Do not stop taking them just because you feel better. You need to take the full course of antibiotics. · Be careful when taking over-the-counter cold or flu medicines and Tylenol at the same time. Many of these medicines have acetaminophen, which is Tylenol. Read the labels to make sure that you are not taking more than the recommended dose. Too much acetaminophen (Tylenol) can be harmful. · Breathe warm, moist air from a steamy shower, a hot bath, or a sink filled with hot water. Avoid cold, dry air. Using a humidifier in your home may help. Follow the directions for cleaning the machine. · Use saline (saltwater) nasal washes to help keep your nasal passages open and wash out mucus and bacteria. You can buy saline nose drops at a grocery store or drugstore. Or you can make your own at home by adding 1 teaspoon of salt and 1 teaspoon of baking soda to 2 cups of distilled water. If you make your own, fill a bulb syringe with the solution, insert the tip into your nostril, and squeeze gently. Recardo Deck your nose.   · Put a hot, wet towel or a warm gel pack on your face 3 or 4 times a day for 5 to 10 minutes each time. · Try a decongestant nasal spray like oxymetazoline (Afrin). Do not use it for more than 3 days in a row. Using it for more than 3 days can make your congestion worse. When should you call for help? Call your doctor now or seek immediate medical care if:    · You have new or worse swelling or redness in your face or around your eyes.     · You have a new or higher fever.    Watch closely for changes in your health, and be sure to contact your doctor if:    · You have new or worse facial pain.     · The mucus from your nose becomes thicker (like pus) or has new blood in it.     · You are not getting better as expected. Where can you learn more? Go to http://yosef-steph.info/. Enter M571 in the search box to learn more about \"Sinusitis: Care Instructions. \"  Current as of: May 12, 2017  Content Version: 11.7  © 0222-6208 [employer]. Care instructions adapted under license by [employer] (which disclaims liability or warranty for this information). If you have questions about a medical condition or this instruction, always ask your healthcare professional. [address] any warranty or liability for your use of this information.

## 2018-10-03 NOTE — LETTER
NOTIFICATION RETURN TO WORK / SCHOOL 
 
10/3/2018 12:28 PM 
 
Ms. Meg Gates [address] [place] [phone] To Whom It May Concern: 
 
Monica Martinez is currently under the care of [facility]. She will return to work/school on: 10/5/18 If there are questions or concerns please have the patient contact our office.  
 
 
 
Sincerely, 
 
 
 
 
NIMA Mary

## 2018-10-03 NOTE — MR AVS SNAPSHOT
[address] [facility] [address] [facility] [address] 
[phone] Patient: Romi George MRN: [id number] [id number] Visit Information Date & Time Provider Department Dept. Phone Encounter #  
 10/3/2018 12:00 PM Sandra Agosto, [address] [id number] Follow-up Instructions Return for as scheduled or as needed. Your Appointments 10/30/2018  1:00 PM  
New Patient with Sylvia Melgar MD  
[address] [facility], [address] and [facility] ONE [facility]) Appt Note: LOW BACK ARTHRITIS AND DEG CHANGES BRING XRAYS [address] [facility] SYSTEMS  
 [address] [place] [address]  
[phone]  
  
   
 [address] [facility],[address] [facility]  
  
    
 2/20/2019  4:30 PM  
FOLLOW UP EXAM with NIMA Pelletier [facility] ([facility]) Appt Note: 6 month F/u  
 [address] [facility] [address]  
[address] [facility] [address] [facility] [address] Upcoming Health Maintenance Date Due Pneumococcal 19-64 Medium Risk (1 of 1 - PPSV23) 4/2/1984 DTaP/Tdap/Td series (1 - Tdap) 4/2/1986 Shingrix Vaccine Age 50> (1 of 2) 4/2/2015 BREAST CANCER SCRN MAMMOGRAM 1/18/2018 Influenza Age 5 to Adult 8/1/2018 PAP AKA CERVICAL CYTOLOGY 1/13/2020 COLONOSCOPY 6/1/2022 Allergies as of 10/3/2018  Review Complete On: 10/3/2018 By: NIMA Pelletier Severity Noted Reaction Type Reactions Motrin [Ibuprofen]  02/09/2018    Other (comments) Directed not to take by physician. Current Immunizations  Reviewed on 10/3/2018 No immunizations on file. Reviewed by Jabier Morales on 10/3/2018 at 12:01 PM  
You Were Diagnosed With   
  
 Codes Comments  Acute bacterial sinusitis    -  Primary ICD-10-CM: J01.90, B96.89 
 ICD-9-CM: 461.9 Mild persistent asthma without complication     HALEY-74-UE: J45.30 ICD-9-CM: 493.90 Vitals BP Pulse Temp Resp Height(growth percentile) Weight(growth percentile) 118/86 (BP 1 Location: Left arm, BP Patient Position: Sitting) 83 (!) 100.8 °F (38.2 °C) (Oral) 18 5' (1.524 m) 223 lb (101.2 kg) LMP SpO2 BMI OB Status Smoking Status 06/01/2017 97% 43.55 kg/m2 Postmenopausal Never Smoker Vitals History BMI and BSA Data Body Mass Index Body Surface Area 43.55 kg/m 2 2.07 m 2 Preferred Pharmacy Pharmacy Name Phone [address] [place] [address]. [phone] Your Updated Medication List  
  
   
This list is accurate as of 10/3/18 12:37 PM.  Always use your most recent med list.  
  
  
  
  
 albuterol 90 mcg/actuation inhaler Commonly known as:  PROVENTIL HFA, VENTOLIN HFA, PROAIR HFA Take 2 Puffs by inhalation every four (4) hours as needed for Wheezing. amoxicillin-clavulanate 875-125 mg per tablet Commonly known as:  AUGMENTIN Take 1 Tab by mouth two (2) times a day for 10 days. aspirin 81 mg chewable tablet Take 1 Tab by mouth daily. FISH  mg Cap Generic drug:  Omega-3 Fatty Acids Take  by mouth. fluticasone-salmeterol 100-50 mcg/dose diskus inhaler Commonly known as:  ADVAIR Take 1 Puff by inhalation every twelve (12) hours. lisinopril-hydroCHLOROthiazide 10-12.5 mg per tablet Commonly known as:  Aretta Rist Take 1 Tab by mouth daily. mometasone 50 mcg/actuation nasal spray Commonly known as:  NASONEX  
2 Sprays by Both Nostrils route daily. Prescriptions Sent to Pharmacy Refills  
 amoxicillin-clavulanate (AUGMENTIN) 875-125 mg per tablet 0 Sig: Take 1 Tab by mouth two (2) times a day for 10 days. Class: Normal  
 Pharmacy: [address] [place] [address] . Ph #: [phone] Route: Oral  
 fluticasone-salmeterol (ADVAIR) 100-50 mcg/dose diskus inhaler 5 Sig: Take 1 Puff by inhalation every twelve (12) hours. Class: Normal  
 Pharmacy: [address].  #: [phone] Route: Inhalation Follow-up Instructions Return for as scheduled or as needed. Patient Instructions Sinusitis: Care Instructions Your Care Instructions Sinusitis is an infection of the lining of the sinus cavities in your head. Sinusitis often follows a cold. It causes pain and pressure in your head and face. In most cases, sinusitis gets better on its own in 1 to 2 weeks. But some mild symptoms may last for several weeks. Sometimes antibiotics are needed. Follow-up care is a key part of your treatment and safety. Be sure to make and go to all appointments, and call your doctor if you are having problems. It's also a good idea to know your test results and keep a list of the medicines you take. How can you care for yourself at home? · Take an over-the-counter pain medicine, such as acetaminophen (Tylenol), ibuprofen (Advil, Motrin), or naproxen (Aleve). Read and follow all instructions on the label. · If the doctor prescribed antibiotics, take them as directed. Do not stop taking them just because you feel better. You need to take the full course of antibiotics. · Be careful when taking over-the-counter cold or flu medicines and Tylenol at the same time. Many of these medicines have acetaminophen, which is Tylenol. Read the labels to make sure that you are not taking more than the recommended dose. Too much acetaminophen (Tylenol) can be harmful. · Breathe warm, moist air from a steamy shower, a hot bath, or a sink filled with hot water. Avoid cold, dry air. Using a humidifier in your home may help. Follow the directions for cleaning the machine.  
· Use saline (saltwater) nasal washes to help keep your nasal passages open and wash out mucus and bacteria. You can buy saline nose drops at a grocery store or drugstore. Or you can make your own at home by adding 1 teaspoon of salt and 1 teaspoon of baking soda to 2 cups of distilled water. If you make your own, fill a bulb syringe with the solution, insert the tip into your nostril, and squeeze gently. Deitra Screen your nose. · Put a hot, wet towel or a warm gel pack on your face 3 or 4 times a day for 5 to 10 minutes each time. · Try a decongestant nasal spray like oxymetazoline (Afrin). Do not use it for more than 3 days in a row. Using it for more than 3 days can make your congestion worse. When should you call for help? Call your doctor now or seek immediate medical care if: 
  · You have new or worse swelling or redness in your face or around your eyes.  
  · You have a new or higher fever.  
 Watch closely for changes in your health, and be sure to contact your doctor if: 
  · You have new or worse facial pain.  
  · The mucus from your nose becomes thicker (like pus) or has new blood in it.  
  · You are not getting better as expected. Where can you learn more? Go to http://yosef-steph.info/. Enter I435 in the search box to learn more about \"Sinusitis: Care Instructions. \" Current as of: May 12, 2017 Content Version: 11.7 © 7377-0244 [employer]Value. Care instructions adapted under license by [employer] (which disclaims liability or warranty for this information). If you have questions about a medical condition or this instruction, always ask your healthcare professional. [place] [address] any warranty or liability for your use of this information. Introducing \[facility] [place] [facility]\"" & HEALTH SERVICES! [facility] introduces [employer] patient portal. Now you can access parts of your medical record, email your doctor's office, and request medication refills online.    
 
1. In your internet browser, go to [email or web address] 2. Click on the First Time User? Click Here link in the Sign In box. You will see the New Member Sign Up page. 3. Enter your [employer] Access Code exactly as it appears below. You will not need to use this code after youve completed the sign-up process. If you do not sign up before the expiration date, you must request a new code. · [employer] Access Code: [email or web address] Expires: 12/25/2018  5:23 AM 
 
4. Enter the last four digits of your Social Security Number (xxxx) and Date of Birth (mm/dd/yyyy) as indicated and click Submit. You will be taken to the next sign-up page. 5. Create a Yozonst ID. This will be your [employer] login ID and cannot be changed, so think of one that is secure and easy to remember. 6. Create a [employer] password. You can change your password at any time. 7. Enter your Password Reset Question and Answer. This can be used at a later time if you forget your password. 8. Enter your e-mail address. You will receive e-mail notification when new information is available in [address] 9. Click Sign Up. You can now view and download portions of your medical record. 10. Click the Download Summary menu link to download a portable copy of your medical information. If you have questions, please visit the Frequently Asked Questions section of the [employer] website. Remember, [employer] is NOT to be used for urgent needs. For medical emergencies, dial 911. Now available from your iPhone and Android! Please provide this summary of care documentation to your next provider. Your primary care clinician is listed as Alysha Sellers. If you have any questions after today's visit, please call [phone].

## 2018-10-03 NOTE — PROGRESS NOTES
Patient: Mell Cruz MRN: [id number]  SSN: [id number]    YOB: 1965  Age: 48 y.o. Sex: female      Date of Service: 10/3/2018   Provider: NIMA Hendricks   Office Location:   [address] [facility] [address], [address]  Office Phone: [phone]  Office Fax: [phone]        REASON FOR VISIT:   Chief Complaint   Patient presents with    Cough    Nasal Congestion    Sinus Pain    Ear Fullness        VITALS:   Visit Vitals    /86 (BP 1 Location: Left arm, BP Patient Position: Sitting)    Pulse 83    Temp (!) 100.8 °F (38.2 °C) (Oral)    Resp 18    Ht 5' (1.524 m)    Wt 223 lb (101.2 kg)    LMP 06/01/2017  Comment: patient had spotting tues and wed    SpO2 97%    BMI 43.55 kg/m2       MEDICATIONS:   Current Outpatient Prescriptions   Medication Sig Dispense Refill    lisinopril-hydroCHLOROthiazide (PRINZIDE, ZESTORETIC) 10-12.5 mg per tablet Take 1 Tab by mouth daily. 30 Tab 5    fluticasone-salmeterol (ADVAIR) 100-50 mcg/dose diskus inhaler Take 1 Puff by inhalation every twelve (12) hours. 1 Inhaler 5    mometasone (NASONEX) 50 mcg/actuation nasal spray 2 Sprays by Both Nostrils route daily. 1 Container 0    aspirin 81 mg chewable tablet Take 1 Tab by mouth daily. 30 Tab 0    Omega-3 Fatty Acids (FISH OIL) 300 mg cap Take  by mouth.  albuterol (PROVENTIL HFA, VENTOLIN HFA, PROAIR HFA) 90 mcg/actuation inhaler Take 2 Puffs by inhalation every four (4) hours as needed for Wheezing. 1 Inhaler 5        ALLERGIES:   Allergies   Allergen Reactions    Motrin [Ibuprofen] Other (comments)     Directed not to take by physician.          ACTIVE MEDICAL PROBLEMS:  Patient Active Problem List   Diagnosis Code    Allergic rhinitis J30.9    BMI 40.0-44.9, adult ([facility] Z68.41    Hypertension I10    Elevated TSH R79.89    Asthma J45.909    Chronic low back pain without sciatica M54.5, G89.29    Mixed hyperlipidemia E78.2          HISTORY OF PRESENT ILLNESS:   Lorrie Maldonado is a 48 y.o. female who presents to the office for acute care. Here today with complaints of fever, sinus pain, nasal congestion, and copious rhinorrhea x 2 days. Patient reports her allergies have been bothering her for a few weeks, and symptoms had been reasonably well controlled with Nasacort and OTC Allegra and Mucinex. Then yesterday, symptoms began to worsen, and have progressed to what she believes is a sinus infection. She does state that her  has been ill with similar symptoms. REVIEW OF SYSTEMS:  Review of Systems   Constitutional: Positive for fever and malaise/fatigue. Negative for chills. HENT: Positive for congestion and sinus pain. Negative for ear pain ( \"ears feel clogged\") and sore throat. Respiratory: Positive for cough. Negative for hemoptysis, sputum production, shortness of breath and wheezing. Cardiovascular: Negative for chest pain, palpitations and leg swelling. Gastrointestinal: Negative for diarrhea, nausea and vomiting. Neurological: Positive for headaches. Negative for dizziness. PHYSICAL EXAMINATION:  Physical Exam   Constitutional: She is oriented to person, place, and time and well-developed, well-nourished, and in no distress. HENT:   Head: Normocephalic and atraumatic. Right Ear: Tympanic membrane normal.   Left Ear: Tympanic membrane normal.   Nose: Mucosal edema present. Right sinus exhibits maxillary sinus tenderness. Right sinus exhibits no frontal sinus tenderness. Left sinus exhibits maxillary sinus tenderness. Left sinus exhibits no frontal sinus tenderness. Mouth/Throat: Uvula is midline and oropharynx is clear and moist.   Eyes: Conjunctivae are normal. Pupils are equal, round, and reactive to light. Right eye exhibits no discharge. Left eye exhibits no discharge. Neck: Neck supple. Cardiovascular: Normal rate, regular rhythm and normal heart sounds.   Exam reveals no gallop and no friction rub. No murmur heard. Pulmonary/Chest: Effort normal and breath sounds normal. No stridor. She has no wheezes. She has no rales. Lymphadenopathy:     She has cervical adenopathy. Neurological: She is alert and oriented to person, place, and time. Gait normal.   Skin: Skin is warm and dry. No rash noted. Psychiatric: Mood, memory and affect normal.        RESULTS:  No results found for this visit on 10/03/18. ASSESSMENT/PLAN:  Diagnoses and all orders for this visit:    1. Acute bacterial sinusitis  - Start Augmentin as below  - Continue Nasacort, Allegra, and Mucinex  - Rest, push fluids  - Out of work note provided  - Return if not feeling better by early next week  Orders:    -     amoxicillin-clavulanate (AUGMENTIN) 875-125 mg per tablet; Take 1 Tab by mouth two (2) times a day for 10 days. 2. Mild persistent asthma without complication  - Advair refilled  Orders:    -     fluticasone-salmeterol (ADVAIR) 100-50 mcg/dose diskus inhaler; Take 1 Puff by inhalation every twelve (12) hours. All questions answered. Patient expresses understanding and agrees with the plan as detailed above. Follow-up Disposition:  Return for as scheduled or as needed.        PATIENT CARE TEAM:   Patient Care Team:  NIMA Esposito as PCP - 23 Cecilia Vicente MD (General Surgery)       [place]amar [place]   October 3, 2018   12:58 PM

## 2018-10-05 ENCOUNTER — TELEPHONE (OUTPATIENT)
Dept: FAMILY MEDICINE CLINIC | Age: 53
End: 2018-10-05

## 2018-10-05 NOTE — TELEPHONE ENCOUNTER
Pt is requesting an extension on her work note to say that she will go back on Monday 10/8/2018 . States that she still is not feeling better and woke up this morning with the runs. She states that her  is due for an appt today and would like to know if the note can be given to him. Please advise.

## 2018-10-30 ENCOUNTER — OFFICE VISIT (OUTPATIENT)
Dept: ORTHOPEDIC SURGERY | Age: 53
End: 2018-10-30

## 2018-10-30 VITALS
HEIGHT: 60 IN | TEMPERATURE: 98.2 F | SYSTOLIC BLOOD PRESSURE: 157 MMHG | DIASTOLIC BLOOD PRESSURE: 81 MMHG | HEART RATE: 76 BPM | RESPIRATION RATE: 16 BRPM | OXYGEN SATURATION: 99 % | WEIGHT: 220.4 LBS | BODY MASS INDEX: 43.27 KG/M2

## 2018-10-30 DIAGNOSIS — M62.830 MUSCLE SPASM OF BACK: ICD-10-CM

## 2018-10-30 DIAGNOSIS — M51.36 DDD (DEGENERATIVE DISC DISEASE), LUMBAR: ICD-10-CM

## 2018-10-30 DIAGNOSIS — F40.240 CLAUSTROPHOBIA: ICD-10-CM

## 2018-10-30 DIAGNOSIS — M79.18 MYOFASCIAL PAIN: ICD-10-CM

## 2018-10-30 DIAGNOSIS — M47.816 LUMBAR FACET ARTHROPATHY: Primary | ICD-10-CM

## 2018-10-30 RX ORDER — DIAZEPAM 5 MG/1
TABLET ORAL
Qty: 2 TAB | Refills: 0 | Status: SHIPPED | OUTPATIENT
Start: 2018-10-30 | End: 2019-08-21 | Stop reason: ALTCHOICE

## 2018-10-30 NOTE — PATIENT INSTRUCTIONS
Low Back Arthritis: Exercises Your Care Instructions Here are some examples of typical rehabilitation exercises for your condition. Start each exercise slowly. Ease off the exercise if you start to have pain. Your doctor or physical therapist will tell you when you can start these exercises and which ones will work best for you. When you are not being active, find a comfortable position for rest. Some people are comfortable on the floor or a medium-firm bed with a small pillow under their head and another under their knees. Some people prefer to lie on their side with a pillow between their knees. Don't stay in one position for too long. Take short walks (10 to 20 minutes) every 2 to 3 hours. Avoid slopes, hills, and stairs until you feel better. Walk only distances you can manage without pain, especially leg pain. How to do the exercises Pelvic tilt 1. Lie on your back with your knees bent. 2. \"Brace\" your stomachtighten your muscles by pulling in and imagining your belly button moving toward your spine. 3. Press your lower back into the floor. You should feel your hips and pelvis rock back. 4. Hold for 6 seconds while breathing smoothly. 5. Relax and allow your pelvis and hips to rock forward. 6. Repeat 8 to 12 times. Back stretches 1. Get down on your hands and knees on the floor. 2. Relax your head and allow it to droop. Round your back up toward the ceiling until you feel a nice stretch in your upper, middle, and lower back. Hold this stretch for as long as it feels comfortable, or about 15 to 30 seconds. 3. Return to the starting position with a flat back while you are on your hands and knees. 4. Let your back sway by pressing your stomach toward the floor. Lift your buttocks toward the ceiling. 5. Hold this position for 15 to 30 seconds. 6. Repeat 2 to 4 times. Follow-up care is a key part of your treatment and safety.  Be sure to make and go to all appointments, and call your doctor if you are having problems. It's also a good idea to know your test results and keep a list of the medicines you take. Where can you learn more? Go to http://yosef-steph.info/. Enter B700 in the search box to learn more about \"Low Back Arthritis: Exercises. \" Current as of: November 29, 2017 Content Version: 11.8 © 1217-9998 Healthwise, [employer]. Care instructions adapted under license by [employer] (which disclaims liability or warranty for this information). If you have questions about a medical condition or this instruction, always ask your healthcare professional. [address] any warranty or liability for your use of this information.

## 2018-10-30 NOTE — PROGRESS NOTES
[address] 
[address] [place], [facility] Phone: [phone] Fax: [phone] NEW PATIENT Pt's YOB: 1965 ASSESSMENT Diagnoses and all orders for this visit: 1. Lumbar facet arthropathy -     MRI LUMB SPINE WO CONT; Future 2. Muscle spasm of back 3. DDD (degenerative disc disease), lumbar -     MRI LUMB SPINE WO CONT; Future 4. Myofascial pain 5. Claustrophobia 
-     diazePAM (VALIUM) 5 mg tablet; Take 1 tablet 30 minutes before procedure-- may repeat x 1 IMPRESSION AND PLAN: 
Jenniffer Booker is a 48 y.o. right hand dominant female with history of low back pain. Pt complains of pain across the lower back but denies any pain in the legs at this time. She reports occasional burning sensation /numbness in the anterior thighs. Pt tried physical therapy without improvement. 1) Pt was given information on lumbar arthritis exercises. 2) Discussed treatment options with the Pt, including aqua physical therapy, steroid injections, and surgical intervention. 3) She was given information on how to use a TheraCane. 4) A lumbar MRI was ordered. 5) Pt was prescribed Valium 5 mg to take prior to her MRI. 6) Ms. Jeanne eTrry has a reminder for a \"due or due soon\" health maintenance. I have asked that she contact her primary care provider, NIMA Joshua Mai, for follow-up on this health maintenance. 7)  demonstrated consistency with prescribing. 8) Pt will follow-up in 3 weeks or sooner if needed. HISTORY OF PRESENT ILLNESS: 
Jenniffer Booker is a 48 y.o. right hand dominant female with history of low back pain She presents to the office today as a new patient. Pt complains of pain across the lower back but denies any pain in the legs at this time. She admits to a history of sciatica but notes that she has not experience pain in the legs in quit some time.  Pt reports occasional burning sensation /numbness in the anterior thighs. She denies any inciting injuries and notes her pain is progressive. Pt states that she generally experiences pain when washing dishes. She notes that she first experienced pain when she was driving on [employer] in 2005/2006 and experienced sharp muscle spasms in the lower back. She denies any weakness in the legs, tripping, falling, groin pain, or change in bowel/bladder habits. Pt tried physical therapy without improvement but denies ever trying aqua physical therapy. She notes that occasionally her pain was so severe she was unable to attend physical therapy sessions. Of note, she had an embolism procedure in the past to stop the growth of her fibroids. She tried muscle relaxants in the past when she was experiencing sciatica with benefit. Pt states that she is not interested in surgical intervention at this time. She takes Tylenol and notes that she was instructed not to take Motrin since she had a myocardial infarction several years ago. Pt at this time desires to proceed with a lumbar MRI. 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 Pain Scale: 8/10 PCP: NIMA Ying Past Medical History:  
Diagnosis Date  Acute shoulder pain due to trauma 5/17/2016  Allergic rhinitis 11/9/2009  Asthma 2015 PFT confirmed  CAD (coronary artery disease)   
 hx mi-2013 ?  Carpal tunnel syndrome 11/9/2009  Chronic low back pain without sciatica 5/17/2016  Excessive use of nonsteroidal anti-inflammatory drug (NSAID) 5/17/2016  Fibroid, uterine 11/9/2009  GERD (gastroesophageal reflux disease) 11/9/2009  
 Heart failure ([address]   
 heart attack 3 years ago  HLD (hyperlipidemia) 11/9/2009  Hypertension  Hypertriglyceridemia 5/17/2016  Hypothyroidism 5/17/2016  Knee pain, right  SOB (shortness of breath)  Weight gain 5/17/2016 Social History Socioeconomic History  Marital status:  Spouse name: Not on file  Number of children: Not on file  Years of education: Not on file  Highest education level: Not on file Social Needs  Financial resource strain: Not on file  Food insecurity - worry: Not on file  Food insecurity - inability: Not on file  Transportation needs - medical: Not on file  Transportation needs - non-medical: Not on file Occupational History  Not on file Tobacco Use  Smoking status: Never Smoker  Smokeless tobacco: Never Used Substance and Sexual Activity  Alcohol use: No  
  Alcohol/week: 0.0 oz  Drug use: No  
 Sexual activity: Yes  
  Partners: Male Birth control/protection: None Other Topics Concern   Service No  
 Blood Transfusions No  
 Caffeine Concern No  
 Occupational Exposure No  
 Hobby Hazards No  
 Sleep Concern No  
 Stress Concern No  
 Weight Concern Yes  Special Diet No  
 Back Care Yes  Exercise No  
 Bike Helmet No  
 Seat Belt Yes Comment: some times  Self-Exams Yes Social History Narrative  Not on file Current Outpatient Medications Medication Sig Dispense Refill  diazePAM (VALIUM) 5 mg tablet Take 1 tablet 30 minutes before procedure-- may repeat x 1 2 Tab 0  
 fluticasone-salmeterol (ADVAIR) 100-50 mcg/dose diskus inhaler Take 1 Puff by inhalation every twelve (12) hours. 1 Inhaler 5  
 lisinopril-hydroCHLOROthiazide (PRINZIDE, ZESTORETIC) 10-12.5 mg per tablet Take 1 Tab by mouth daily. 30 Tab 5  
 albuterol (PROVENTIL HFA, VENTOLIN HFA, PROAIR HFA) 90 mcg/actuation inhaler Take 2 Puffs by inhalation every four (4) hours as needed for Wheezing. 1 Inhaler 5  
 mometasone (NASONEX) 50 mcg/actuation nasal spray 2 Sprays by Both Nostrils route daily. 1 Container 0  
 aspirin 81 mg chewable tablet Take 1 Tab by mouth daily. 30 Tab 0  
 Omega-3 Fatty Acids (FISH OIL) 300 mg cap Take  by mouth. Allergies Allergen Reactions  Motrin [Ibuprofen] Other (comments) Directed not to take by physician. REVIEW OF SYSTEMS Constitutional: Negative for fever, chills, or weight change. Respiratory: Negative for cough or shortness of breath. Cardiovascular: Negative for chest pain or palpitations. Gastrointestinal: Negative for acid reflux, change in bowel habits, or constipation. Genitourinary: Negative for dysuria and flank pain. Musculoskeletal: Positive for lumbar pain. Skin: Negative for rash. Neurological: Positive for numbness in the anterior thighs. Negative for headaches or dizziness. Endo/Heme/Allergies: Negative for increased bruising. Psychiatric/Behavioral: Negative for difficulty with sleep. PHYSICAL EXAMINATION Visit Vitals /81 Pulse 76 Temp 98.2 °F (36.8 °C) Resp 16 Ht 5' (1.524 m) Wt 220 lb 6.4 oz (100 kg) LMP 06/01/2017 Comment: patient had spotting tues and wed SpO2 99% BMI 43.04 kg/m² Constitutional: Awake, alert, and in no acute distress. HEENT: Normocephalic. Atraumatic. Oropharynx is moist and clear. PERRL. EOMI. Sclerae are nonicteric Heart: Regular rate and rhythm Lungs: Clear to auscultation bilaterally Abdomen: Soft and nontender. Bowel sounds are present Neurological: 1+ symmetrical DTRs in the upper extremities. 1+ symmetrical DTRs in the lower extremities. Sensation to light touch is intact. Negative Mady's sign bilaterally. Skin: warm, dry, and intact. Musculoskeletal: Very tight across the upper trapezii. No pain with extension, axial loading, or forward flexion. No pain with internal or external rotation of her hips. Negative straight leg raise bilaterally. Pain in the right foot with toe walking; no pain with heel walking. No difficulty with the single leg stand bilaterally. Biceps  Triceps Deltoids Wrist Ext Wrist Flex Hand Intrin Right 4/5 4/5 4/5 4/5 4/5 4/5 Left 4/5 4/5 4/5 4/5 4/5 4/5  
 
 
 Hip Flex  Quads Hamstrings Ankle DF EHL Ankle PF Right +4/5 +4/5 +4/5 +4/5 +4/5 +4/5 Left -4/5 -4/5 -4/5 -4/5 -4/5 -4/5 IMAGING: 
 
Lumbar spine x-rays from 09/06/2018 were personally reviewed with the patient and demonstrated: 
Results from [place] encounter on 09/06/18 XR SPINE LUMB 2 OR 3 V Narrative EXAM: X-ray of the Lumbar Spine. INDICATION: Back pain TECHNIQUE: AP, lateral and spot views of the lumbar spine obtained. COMPARISON: None FINDINGS: There are 5 lumbar-type vertebra. The alignment is unremarkable. No 
fracture appreciated. The facets are appropriately aligned. Mild disc space 
height loss with mild endplate osteophyte formation is noted. Mild facet 
arthropathy is noted. Incidental note of calcifications in the pelvis 
potentially related to fibroids. Likely vascular calcifications are noted as 
well. Impression IMPRESSION: 
1. Mild multilevel degenerative disc disease and facet arthropathy. 2.  Nonspecific calcifications in the pelvis potentially related to fibroids and 
vascular calcifications. Written by Carmen Sevilla, as dictated by Enedelia Willingham MD. 
I, Dr. Enedelia Willingham confirm that all documentation is accurate.

## 2018-11-10 ENCOUNTER — HOSPITAL ENCOUNTER (OUTPATIENT)
Age: 53
Discharge: HOME | End: 2018-11-10
Attending: PHYSICAL MEDICINE & REHABILITATION
Payer: COMMERCIAL

## 2018-11-10 DIAGNOSIS — M51.36 DDD (DEGENERATIVE DISC DISEASE), LUMBAR: ICD-10-CM

## 2018-11-10 DIAGNOSIS — M47.816 LUMBAR FACET ARTHROPATHY: ICD-10-CM

## 2018-11-10 PROCEDURE — 72148 MRI LUMBAR SPINE W/O DYE: CPT

## 2018-11-20 ENCOUNTER — OFFICE VISIT (OUTPATIENT)
Dept: ORTHOPEDIC SURGERY | Age: 53
End: 2018-11-20

## 2018-11-20 VITALS
WEIGHT: 219 LBS | SYSTOLIC BLOOD PRESSURE: 156 MMHG | DIASTOLIC BLOOD PRESSURE: 72 MMHG | HEART RATE: 71 BPM | BODY MASS INDEX: 43 KG/M2 | HEIGHT: 60 IN | TEMPERATURE: 98.3 F

## 2018-11-20 DIAGNOSIS — M47.816 LUMBAR FACET ARTHROPATHY: Primary | ICD-10-CM

## 2018-11-20 DIAGNOSIS — M62.830 MUSCLE SPASM OF BACK: ICD-10-CM

## 2018-11-20 DIAGNOSIS — M48.061 SPINAL STENOSIS OF LUMBAR REGION WITHOUT NEUROGENIC CLAUDICATION: ICD-10-CM

## 2018-11-20 DIAGNOSIS — M43.10 ANTEROLISTHESIS: ICD-10-CM

## 2018-11-20 NOTE — PROGRESS NOTES
[address] 
[address] [place], [address] [place] [address] Street Phone: [phone] Fax: [phone] Pt's YOB: 1965 ASSESSMENT Diagnoses and all orders for this visit: 1. Lumbar facet arthropathy 2. Muscle spasm of back 3. Anterolisthesis 4. Spinal stenosis of lumbar region without neurogenic claudication IMPRESSION AND PLAN: 
Raisa Mckinney is a 48 y.o. right hand dominant female with history of lumbar pain. She complains of pain in the lower back and denies any pain radiating down the legs at this time. Pt has been taking Tylenol as needed. 1) Pt was given information on lumbar arthritis exercises. 2) Discussed treatment options with the Pt, including medication, aqua physical therapy, steroid injections, and a lumbar RFA. 3) Pt was given information on radiofrequency ablation. 4) I recommended that the patient lose weight and instructed her to consult Ms Cat Yosephreyes for weight loss plans. 5) I encouraged the patient to exercise regularly. 6) Ms. Mario Richardson has a reminder for a \"due or due soon\" health maintenance. I have asked that she contact her primary care provider, NIMA Tate, for follow-up on this health maintenance. 7)  demonstrated consistency with prescribing. 8) Pt will follow-up in 6-8 weeks or sooner if needed. HISTORY OF PRESENT ILLNESS: 
Raisa Mckinney is a 48 y.o. right hand dominant female with history of lumbar pain and presents to the office today for lumbar MRI follow up. She complains of pain in the lower back and denies any pain radiating down the legs at this time. Pt admits to rare pain in the right leg. Pt was told to avoid ibuprofen due to her prior myocardial infarction. She has been taking Tylenol as needed. Of note, patient is not diabetic. Pt at this time desires to continue with current care. Pain Scale: 5/10 PCP: NIMA Tate 
  
 Past Medical History:  
Diagnosis Date  Acute shoulder pain due to trauma 5/17/2016  Allergic rhinitis 11/9/2009  Asthma 2015 PFT confirmed  CAD (coronary artery disease)   
 hx mi-2013 ?  Carpal tunnel syndrome 11/9/2009  Chronic low back pain without sciatica 5/17/2016  Excessive use of nonsteroidal anti-inflammatory drug (NSAID) 5/17/2016  Fibroid, uterine 11/9/2009  GERD (gastroesophageal reflux disease) 11/9/2009  
 Heart failure ([address]   
 heart attack 3 years ago  HLD (hyperlipidemia) 11/9/2009  Hypertension  Hypertriglyceridemia 5/17/2016  Hypothyroidism 5/17/2016  Knee pain, right  SOB (shortness of breath)  Weight gain 5/17/2016 Social History Socioeconomic History  Marital status:  Spouse name: Not on file  Number of children: Not on file  Years of education: Not on file  Highest education level: Not on file Social Needs  Financial resource strain: Not on file  Food insecurity - worry: Not on file  Food insecurity - inability: Not on file  Transportation needs - medical: Not on file  Transportation needs - non-medical: Not on file Occupational History  Not on file Tobacco Use  Smoking status: Never Smoker  Smokeless tobacco: Never Used Substance and Sexual Activity  Alcohol use: No  
  Alcohol/week: 0.0 oz  Drug use: No  
 Sexual activity: Yes  
  Partners: Male Birth control/protection: None Other Topics Concern   Service No  
 Blood Transfusions No  
 Caffeine Concern No  
 Occupational Exposure No  
 Hobby Hazards No  
 Sleep Concern No  
 Stress Concern No  
 Weight Concern Yes  Special Diet No  
 Back Care Yes  Exercise No  
 Bike Helmet No  
 Seat Belt Yes Comment: some times  Self-Exams Yes Social History Narrative  Not on file Current Outpatient Medications Medication Sig Dispense Refill  diazePAM (VALIUM) 5 mg tablet Take 1 tablet 30 minutes before procedure-- may repeat x 1 2 Tab 0  
 fluticasone-salmeterol (ADVAIR) 100-50 mcg/dose diskus inhaler Take 1 Puff by inhalation every twelve (12) hours. 1 Inhaler 5  
 lisinopril-hydroCHLOROthiazide (PRINZIDE, ZESTORETIC) 10-12.5 mg per tablet Take 1 Tab by mouth daily. 30 Tab 5  
 albuterol (PROVENTIL HFA, VENTOLIN HFA, PROAIR HFA) 90 mcg/actuation inhaler Take 2 Puffs by inhalation every four (4) hours as needed for Wheezing. 1 Inhaler 5  
 mometasone (NASONEX) 50 mcg/actuation nasal spray 2 Sprays by Both Nostrils route daily. 1 Container 0  
 aspirin 81 mg chewable tablet Take 1 Tab by mouth daily. 30 Tab 0  
 Omega-3 Fatty Acids (FISH OIL) 300 mg cap Take  by mouth. Allergies Allergen Reactions  Motrin [Ibuprofen] Other (comments) Directed not to take by physician. REVIEW OF SYSTEMS Constitutional: Negative for fever, chills, or weight change. Respiratory: Negative for cough or shortness of breath. Cardiovascular: Negative for chest pain or palpitations. Gastrointestinal: Negative for acid reflux, change in bowel habits, or constipation. Genitourinary: Negative for dysuria and flank pain. Musculoskeletal: Positive for lumbar pain. Skin: Negative for rash. Neurological: Negative for headaches, dizziness, or numbness. Endo/Heme/Allergies: Negative for increased bruising. Psychiatric/Behavioral: Negative for difficulty with sleep. PHYSICAL EXAMINATION Visit Vitals /72 Pulse 71 Temp 98.3 °F (36.8 °C) (Oral) Ht 5' (1.524 m) Wt 219 lb (99.3 kg) LMP 06/01/2017 Comment: patient had spotting tues and wed BMI 42.77 kg/m² Constitutional: Awake, alert, and in no acute distress. Neurological: 1+ symmetrical DTRs in the upper extremities. 1+ symmetrical DTRs in the lower extremities. Sensation to light touch is intact. Negative Mady's sign bilaterally. Skin: warm, dry, and intact. Musculoskeletal: Tenderness to palpation in the lower lumbar region. Pain with extension and axial loading. Improved with forward flexion. Difficulty transitioning from sit to stand. No pain with internal or external rotation of her hips. Negative straight leg raise bilaterally. Biceps  Triceps Deltoids Wrist Ext Wrist Flex Hand Intrin Right +4/5 +4/5 +4/5 +4/5 +4/5 +4/5 Left +4/5 +4/5 +4/5 +4/5 +4/5 +4/5 Hip Flex  Quads Hamstrings Ankle DF EHL Ankle PF Right +4/5 +4/5 +4/5 +4/5 +4/5 +4/5 Left +4/5 +4/5 +4/5 +4/5 +4/5 +4/5 IMAGING: 
 
Lumbar spine MRI from 11/10/2018 was personally reviewed with the patient and demonstrated: 
Results from [place] encounter on 11/10/18 MRI LUMB SPINE WO CONT Narrative EXAM:  MRI LUMB SPINE WO CONT INDICATION: Lumbar facet arthropathy,  Increased pain, x1 yr, progressive, 
difficulty standing/walking, degenerative facets, DDD L5/S1, had PT, No NSAID's 
due to cardiology. COMPARISON: Correlation with radiographs 9/6/2018 TECHNIQUE:  
MR imaging of the lumbar spine was performed with sagittal T1, T2, STIR;  axial 
T1, T2. Contrast was not administered. FINDINGS: 
Trace anterolisthesis L4-5. Vertebral body heights are maintained. Mild disc 
desiccation and disc height loss L4-S1. No suspicious bone marrow lesion or 
infiltrative bone marrow process. Subchondral bone marrow edema and subchondral 
cysts at the bilateral L4-5 facet with advanced facet arthropathy, compatible 
with reactive changes. The conus medullaris is normal in signal intensity 
terminating at L1 level. Correlation of sagittal and axial images at the level of the discs demonstrates 
the following: 
 
Lower thoracic levels T10-L1 demonstrate minimal disc bulging with patent 
central canal. There is left T11/T12 foraminal stenosis due to facet 
hypertrophy. L1/2:  No significant disc pathology. No central canal or foraminal stenosis. L2/3:  No significant disc pathology. No central canal or foraminal stenosis. L3/4:  No significant disc pathology. Mild facet hypertrophy and ligamentum 
flavum hypertrophy. No significant central canal stenosis. No foraminal 
stenosis. L4/5:  Advanced facet hypertrophy with ligamentum flavum hypertrophy. Mild disc 
height loss and trace anterolisthesis. Minimal disc bulge. Prominent dorsal 
epidural fat. Mild central canal stenosis. Mild foraminal stenosis. L5/S1:  Mild disc height loss and mild disc bulge with. Mild facet hypertrophy. Prominent dorsal epidural fat. Mild central canal stenosis and foraminal 
stenosis right greater than left due to disc bulge and osteophytic spurring. No incidental abnormality in the partially imaged retroperitoneal structures. Impression IMPRESSION:   
 
1. Advanced L4/L5 facet arthropathy with reactive bone marrow edema changes and 
grade 1 anterolisthesis. Mild degenerative disc disease and overall mild central 
canal and foraminal stenosis at this level. 2. Mild degenerative changes L3/4 and L5/S1 as discussed. Written by Lauren Vann, as dictated by Jacob Meigs, MD. 
I, Dr. Jacob Meigs confirm that all documentation is accurate.

## 2018-11-20 NOTE — PATIENT INSTRUCTIONS
Low Back Arthritis: Exercises Your Care Instructions Here are some examples of typical rehabilitation exercises for your condition. Start each exercise slowly. Ease off the exercise if you start to have pain. Your doctor or physical therapist will tell you when you can start these exercises and which ones will work best for you. When you are not being active, find a comfortable position for rest. Some people are comfortable on the floor or a medium-firm bed with a small pillow under their head and another under their knees. Some people prefer to lie on their side with a pillow between their knees. Don't stay in one position for too long. Take short walks (10 to 20 minutes) every 2 to 3 hours. Avoid slopes, hills, and stairs until you feel better. Walk only distances you can manage without pain, especially leg pain. How to do the exercises Pelvic tilt 1. Lie on your back with your knees bent. 2. \"Brace\" your stomachtighten your muscles by pulling in and imagining your belly button moving toward your spine. 3. Press your lower back into the floor. You should feel your hips and pelvis rock back. 4. Hold for 6 seconds while breathing smoothly. 5. Relax and allow your pelvis and hips to rock forward. 6. Repeat 8 to 12 times. Back stretches 1. Get down on your hands and knees on the floor. 2. Relax your head and allow it to droop. Round your back up toward the ceiling until you feel a nice stretch in your upper, middle, and lower back. Hold this stretch for as long as it feels comfortable, or about 15 to 30 seconds. 3. Return to the starting position with a flat back while you are on your hands and knees. 4. Let your back sway by pressing your stomach toward the floor. Lift your buttocks toward the ceiling. 5. Hold this position for 15 to 30 seconds. 6. Repeat 2 to 4 times. Follow-up care is a key part of your treatment and safety.  Be sure to make and go to all appointments, and call your doctor if you are having problems. It's also a good idea to know your test results and keep a list of the medicines you take. Where can you learn more? Go to http://yosef-steph.info/. Enter A356 in the search box to learn more about \"Low Back Arthritis: Exercises. \" Current as of: November 29, 2017 Content Version: 11.8 © 6938-9762 [employer]. Care instructions adapted under license by [employer] (which disclaims liability or warranty for this information). If you have questions about a medical condition or this instruction, always ask your healthcare professional. [address] any warranty or liability for your use of this information. Learning About Medial Branch Block and Neurotomy What are medial branch block and neurotomy? Facet joints connect your vertebrae to each other. Problems in these joints can cause chronic (long-term) pain in the neck or back. They can sometimes affect the shoulders, arms, buttocks, or legs. Medial branch nerves are the nerves that carry many of the pain messages from your facet joints. Radiofrequency medial branch neurotomy is a type of medial branch neurotomy that is used to relieve arthritis pain. It uses radio waves to damage nerves in your neck or back so that they can no longer send pain messages to your brain. Before your doctor knows if a neurotomy will help you, he or she will do a medial branch block to find out if certain nerves are the ones that are a source of your pain. You will need two separate visits to the outpatient center or hospital to have both procedures. How is a medial branch block done? The doctor will use a tiny needle to numb the skin where you will get the block. Then he or she puts the block needle into the numbed area. You may feel some pressure, but you should not feel pain.  Using fluoroscopy (live X-ray) to guide the needle, the doctor injects medicine onto one or more nerves to make them numb. If you get relief from your pain in the next 4 to 6 hours, it's a sign that those nerves may be contributing to your pain. The relief will last only a short time. You may then have a medial branch neurotomy at a later visit to try to get longer relief. It takes 20 to 30 minutes to get the block. You can go home after the doctor watches you for about an hour. You will get instructions on how to report how much pain you have when you are at home. You will need someone to drive you home. How is medial branch neurotomy done? The doctor will use a tiny needle to numb the skin where you will get the neurotomy. Then he or she puts the neurotomy needle into the numbed area. You may feel some pressure. Using fluoroscopy (live X-ray) to guide the needle, the doctor sends radio waves through the needle to the nerve for 60 to 90 seconds. The radio waves heat the nerve, which damages it. The doctor may do this several times. And he or she may treat more than one nerve. It takes 45 to 90 minutes to get a neurotomy, depending on how many nerves are heated. You will probably go home 30 to 60 minutes later. You will need someone to drive you home. What can you expect after a neurotomy? You may feel a little sore or tender at the injection site at first. But after a successful neurotomy, most people have pain relief right away. It often lasts for 9 to 12 months or longer. Sometimes the pain relief is permanent. If your pain does come back, it may mean that the damaged nerve has healed and can send pain messages again. Or it can mean that a different nerve is causing pain. Your doctor will discuss your options with you. Follow-up care is a key part of your treatment and safety.  Be sure to make and go to all appointments, and call your doctor if you are having problems. It's also a good idea to know your test results and keep a list of the medicines you take. Where can you learn more? Go to http://yosef-steph.info/. Enter R547 in the search box to learn more about \"Learning About Medial Branch Block and Neurotomy. \" Current as of: June 4, 2018 Content Version: 11.8 © 6614-4462 [employer]. Care instructions adapted under license by [employer] (which disclaims liability or warranty for this information). If you have questions about a medical condition or this instruction, always ask your healthcare professional. [place] [address] any warranty or liability for your use of this information.

## 2019-01-09 ENCOUNTER — TELEPHONE (OUTPATIENT)
Dept: FAMILY MEDICINE CLINIC | Age: 54
End: 2019-01-09

## 2019-01-09 NOTE — TELEPHONE ENCOUNTER
Pt states that [facility] is returning a call from the nurse. She states that her  Soraya Willett is on her Hipaa and would like if the nurse could call him for what needs to be done. [phone].  Please advise

## 2019-01-10 NOTE — TELEPHONE ENCOUNTER
Called  and patient home and cell number. This call was concerning message below. No call was placed to patient. Patient may have received call from another facility.

## 2019-02-16 ENCOUNTER — HOSPITAL ENCOUNTER (OUTPATIENT)
Dept: LAB | Age: 54
Discharge: HOME | End: 2019-02-16

## 2019-02-16 LAB — XX-LABCORP SPECIMEN COL,LCBCF: NORMAL

## 2019-02-16 PROCEDURE — 99001 SPECIMEN HANDLING PT-LAB: CPT

## 2019-02-18 LAB
25(OH)D3+25(OH)D2 SERPL-MCNC: 24.3 NG/ML (ref 30–100)
ALBUMIN SERPL-MCNC: 3.7 G/DL (ref 3.5–5.5)
ALBUMIN/GLOB SERPL: 1.1 {RATIO} (ref 1.2–2.2)
ALP SERPL-CCNC: 96 IU/L (ref 39–117)
ALT SERPL-CCNC: 17 IU/L (ref 0–32)
AST SERPL-CCNC: 14 IU/L (ref 0–40)
BILIRUB SERPL-MCNC: 0.3 MG/DL (ref 0–1.2)
BUN SERPL-MCNC: 11 MG/DL (ref 6–24)
BUN/CREAT SERPL: 14 (ref 9–23)
CALCIUM SERPL-MCNC: 9.1 MG/DL (ref 8.7–10.2)
CHLORIDE SERPL-SCNC: 101 MMOL/L (ref 96–106)
CHOLEST SERPL-MCNC: 193 MG/DL (ref 100–199)
CO2 SERPL-SCNC: 25 MMOL/L (ref 20–29)
CREAT SERPL-MCNC: 0.8 MG/DL (ref 0.57–1)
GLOBULIN SER CALC-MCNC: 3.5 G/DL (ref 1.5–4.5)
GLUCOSE SERPL-MCNC: 102 MG/DL (ref 65–99)
HBA1C MFR BLD: 6.5 % (ref 4.8–5.6)
HDLC SERPL-MCNC: 40 MG/DL
INTERPRETATION, 910389: NORMAL
LDLC SERPL CALC-MCNC: 138 MG/DL (ref 0–99)
Lab: NORMAL
POTASSIUM SERPL-SCNC: 4.1 MMOL/L (ref 3.5–5.2)
PROT SERPL-MCNC: 7.2 G/DL (ref 6–8.5)
SODIUM SERPL-SCNC: 141 MMOL/L (ref 134–144)
TRIGL SERPL-MCNC: 74 MG/DL (ref 0–149)
TSH SERPL DL<=0.005 MIU/L-ACNC: 1.3 UIU/ML (ref 0.45–4.5)
VLDLC SERPL CALC-MCNC: 15 MG/DL (ref 5–40)

## 2019-02-20 ENCOUNTER — OFFICE VISIT (OUTPATIENT)
Dept: FAMILY MEDICINE CLINIC | Age: 54
End: 2019-02-20

## 2019-02-20 VITALS
RESPIRATION RATE: 12 BRPM | OXYGEN SATURATION: 98 % | HEIGHT: 60 IN | BODY MASS INDEX: 44.21 KG/M2 | TEMPERATURE: 98.8 F | SYSTOLIC BLOOD PRESSURE: 140 MMHG | HEART RATE: 86 BPM | WEIGHT: 225.2 LBS | DIASTOLIC BLOOD PRESSURE: 86 MMHG

## 2019-02-20 DIAGNOSIS — E78.2 MIXED HYPERLIPIDEMIA: ICD-10-CM

## 2019-02-20 DIAGNOSIS — N89.8 VAGINAL DISCHARGE: ICD-10-CM

## 2019-02-20 DIAGNOSIS — J45.30 MILD PERSISTENT ASTHMA WITHOUT COMPLICATION: ICD-10-CM

## 2019-02-20 DIAGNOSIS — E11.9 CONTROLLED TYPE 2 DIABETES MELLITUS WITHOUT COMPLICATION, WITHOUT LONG-TERM CURRENT USE OF INSULIN (HCC): ICD-10-CM

## 2019-02-20 DIAGNOSIS — I10 ESSENTIAL HYPERTENSION: Primary | ICD-10-CM

## 2019-02-20 DIAGNOSIS — E55.9 VITAMIN D DEFICIENCY: ICD-10-CM

## 2019-02-20 DIAGNOSIS — M51.36 DDD (DEGENERATIVE DISC DISEASE), LUMBAR: ICD-10-CM

## 2019-02-20 PROBLEM — I25.10 CORONARY ARTERY DISEASE INVOLVING NATIVE CORONARY ARTERY OF NATIVE HEART WITHOUT ANGINA PECTORIS: Status: ACTIVE | Noted: 2019-02-20

## 2019-02-20 RX ORDER — METRONIDAZOLE 500 MG/1
500 TABLET ORAL 2 TIMES DAILY
Qty: 14 TAB | Refills: 0 | Status: SHIPPED | OUTPATIENT
Start: 2019-02-20 | End: 2019-02-27

## 2019-02-20 RX ORDER — ALBUTEROL SULFATE 90 UG/1
2 AEROSOL, METERED RESPIRATORY (INHALATION)
Qty: 1 INHALER | Refills: 5 | Status: SHIPPED | OUTPATIENT
Start: 2019-02-20 | End: 2020-02-23

## 2019-02-20 RX ORDER — ERGOCALCIFEROL 1.25 MG/1
50000 CAPSULE ORAL
Qty: 12 CAP | Refills: 0 | Status: SHIPPED | OUTPATIENT
Start: 2019-02-20

## 2019-02-20 NOTE — PROGRESS NOTES
Chief Complaint   Patient presents with    Hypertension    Cholesterol Problem    Vitamin D Deficiency    Cough    Vaginal Discharge     2-3 months     Patient is not fasting. Patient in room # 5.       1. Have you been to the ER, urgent care clinic since your last visit? Hospitalized since your last visit? No    2. Have you seen or consulted any other health care providers outside of the [address] [place] [address] since your last visit? Include any pap smears or colon screening. No    HM Reviewed. Flu due. Microalbumin due.   Flowsheet, Learning needs, phq and abuse completed  3 most recent PHQ Screens 2/20/2019   Little interest or pleasure in doing things Not at all   Feeling down, depressed, irritable, or hopeless Not at all   Total Score PHQ 2 0

## 2019-02-20 NOTE — PATIENT INSTRUCTIONS

## 2019-02-20 NOTE — PROGRESS NOTES
Patient: Ashley Carvalho MRN: [id number]  SSN: [id number]    YOB: 1965  Age: 48 y.o. Sex: female      Date of Service: 2/20/2019   Provider: NIMA Stephens   Office Location:   [address] [place] [address] Dr Julien rose, [address]  Office Phone: [phone]  Office Fax: [phone]      REASON FOR VISIT:   Chief Complaint   Patient presents with    Hypertension    Cholesterol Problem    Vitamin D Deficiency    Cough    Vaginal Discharge     2-3 months        VITALS:   Visit Vitals  /86 (BP 1 Location: Left arm, BP Patient Position: Sitting) Comment: manual   Pulse 86   Temp 98.8 °F (37.1 °C) (Oral)   Resp 12   Ht 5' (1.524 m)   Wt 225 lb 3.2 oz (102.2 kg)   LMP 06/01/2017 Comment: patient had spotting tues and wed   SpO2 98%   BMI 43.98 kg/m²        MEDICATIONS:   Current Outpatient Medications on File Prior to Visit   Medication Sig Dispense Refill    fluticasone-salmeterol (ADVAIR) 100-50 mcg/dose diskus inhaler Take 1 Puff by inhalation every twelve (12) hours. 1 Inhaler 5    lisinopril-hydroCHLOROthiazide (PRINZIDE, ZESTORETIC) 10-12.5 mg per tablet Take 1 Tab by mouth daily. 30 Tab 5    albuterol (PROVENTIL HFA, VENTOLIN HFA, PROAIR HFA) 90 mcg/actuation inhaler Take 2 Puffs by inhalation every four (4) hours as needed for Wheezing. 1 Inhaler 5    mometasone (NASONEX) 50 mcg/actuation nasal spray 2 Sprays by Both Nostrils route daily. 1 Container 0    aspirin 81 mg chewable tablet Take 1 Tab by mouth daily. 30 Tab 0    Omega-3 Fatty Acids (FISH OIL) 300 mg cap Take  by mouth.  diazePAM (VALIUM) 5 mg tablet Take 1 tablet 30 minutes before procedure-- may repeat x 1 2 Tab 0     No current facility-administered medications on file prior to visit. ALLERGIES:   Allergies   Allergen Reactions    Motrin [Ibuprofen] Other (comments)     Directed not to take by physician.          MEDICAL/SURGICAL HISTORY:  Past Medical History:   Diagnosis Date    Allergic rhinitis 11/9/2009    Asthma 2015    PFT confirmed    CAD (coronary artery disease)     hx mi-2013 ?  Carpal tunnel syndrome 11/9/2009    Chronic low back pain without sciatica 5/17/2016    Fibroid, uterine 11/9/2009    GERD (gastroesophageal reflux disease) 11/9/2009    Heart attack ([facility] [address]     MI approx 2013    HLD (hyperlipidemia) 11/9/2009    Hypertension     Hypertriglyceridemia 5/17/2016    Hypothyroidism 5/17/2016      Past Surgical History:   Procedure Laterality Date    COLONOSCOPY N/A 6/1/2017    COLONOSCOPY with Polypectomy performed by Tad Becerra MD at [address] HX BREAST BIOPSY      benign, left    HX GYN      fibroids removed    HX MYOMECTOMY      open first, then embolization 2nd time.  HX OTHER SURGICAL      cyst removed    HX TONSILLECTOMY      MS COLONOSCOPY W/BIOPSY SINGLE/MULTIPLE N/A 06/01/2017    Dr. Jane Gutierrez:  Family History   Problem Relation Age of Onset    Hypertension Mother     Stroke Father         mini strokes, not sure of number    Hypertension Father     Seizures Father     Cancer Maternal Grandfather         not sure type        SOCIAL HISTORY:  Social History     Tobacco Use    Smoking status: Never Smoker    Smokeless tobacco: Never Used   Substance Use Topics    Alcohol use: No     Alcohol/week: 0.0 oz    Drug use: No             HISTORY OF PRESENT ILLNESS: Uche Roberts is a 48 y.o. female who presents to the office for a routine follow up visit. Hypertension -   Stable, BP mildly elevated today   Reports compliance with the following regimen: lisinopril-HCTZ 10-12.5 mg daily  Home BP readings: not checking. Lifestyle modifications: trying to follow a plant based diet       Hyperlipidemia -   In the setting of possible CAD -- patient reports she had an MI in 2013, but to date we have no record of this.   Patient has been resistant to statin therapy despite her CV risk, and I had told her at her last visit that if she could get her LDL under 100 with diet and exercise, then she did not need to take the statin. She has been able to drop her LDL from 148 in March to 90 on 8/11. Unfortunately, her more recent fasting lipid panel from 2/18/19 shows that LDL has increased to 138. Prediabetes -   Patient has been struggling to lose weight for several years now, and A1c has been hovering in the 5.8-6.2 range. Most recent labs done on 2/16/19 showed an increase in A1c to 6.5%     Asthma -   Mild-moderate persistent  Generally well controlled on Advair with albuterol PRN. Patient states she feels like she is coming down with a bit of a cold today and has had a dry cough for the past 2 days. She did need her rescue inhaler this morning. Denies fevers, chest pain, SOB.      Low Back Pain -   Patient continues to complain of bilateral low back pain. She completed physical therapy in May and did not find it to be very helpful. MRI in November showed advanced L4/L5 facet arthropathy, mild degenerative disc changes and mild central canal and foraminal stenosis. She is being followed by Dr. Franco Zacarias at [address] [place] [address], but missed her appt in January. Vaginal Discharge -   Acutely, patient complains of abnormal vaginal discharge and odor x about 2 months. Discharge is yellowish brown in color. She denies any associated itching, burning, or skin lesions. No pelvic or abdominal pain. No dysuria, hematuria, frequency or urgency. No longer getting a menstrual period       REVIEW OF SYSTEMS:  Review of Systems   Constitutional: Negative for chills, fever and malaise/fatigue. Respiratory: Negative for cough, shortness of breath and wheezing. Cardiovascular: Negative for chest pain, palpitations and leg swelling. Gastrointestinal: Negative for abdominal pain, constipation, diarrhea, nausea and vomiting.         PHYSICAL EXAMINATION:  Physical Exam   Constitutional: She is oriented to person, place, and time and well-developed, well-nourished, and in no distress. Cardiovascular: Normal rate, regular rhythm and normal heart sounds. Exam reveals no gallop and no friction rub. No murmur heard. Pulmonary/Chest: Effort normal and breath sounds normal. She has no wheezes. She has no rales. Genitourinary: Vulva normal. Cervix exhibits no motion tenderness. Vulva exhibits no erythema, no exudate, no lesion, no rash and no tenderness. Vagina exhibits normal mucosa and no lesion. Thin  fishy  yellow and vaginal discharge found. Neurological: She is alert and oriented to person, place, and time. Gait normal.   Skin: Skin is warm and dry. No rash noted. Psychiatric: Mood, memory and affect normal.        RESULTS:  Lab Results   Component Value Date/Time    Sodium 141 02/16/2019 10:37 AM    Potassium 4.1 02/16/2019 10:37 AM    Chloride 101 02/16/2019 10:37 AM    CO2 25 02/16/2019 10:37 AM    Anion gap 9 09/10/2017 11:38 AM    Glucose 102 (H) 02/16/2019 10:37 AM    BUN 11 02/16/2019 10:37 AM    Creatinine 0.80 02/16/2019 10:37 AM    BUN/Creatinine ratio 14 02/16/2019 10:37 AM    GFR est AA 97 02/16/2019 10:37 AM    GFR est non-AA 84 02/16/2019 10:37 AM    Calcium 9.1 02/16/2019 10:37 AM    Bilirubin, total 0.3 02/16/2019 10:37 AM    AST (SGOT) 14 02/16/2019 10:37 AM    Alk.  phosphatase 96 02/16/2019 10:37 AM    Protein, total 7.2 02/16/2019 10:37 AM    Albumin 3.7 02/16/2019 10:37 AM    Globulin 5.4 (H) 09/10/2017 11:38 AM    A-G Ratio 1.1 (L) 02/16/2019 10:37 AM    ALT (SGPT) 17 02/16/2019 10:37 AM      Lab Results   Component Value Date/Time    Cholesterol, total 193 02/16/2019 10:37 AM    HDL Cholesterol 40 02/16/2019 10:37 AM    LDL, calculated 138 (H) 02/16/2019 10:37 AM    VLDL, calculated 15 02/16/2019 10:37 AM    Triglyceride 74 02/16/2019 10:37 AM    CHOL/HDL Ratio 5.4 (H) 01/13/2017 10:09 AM      Lab Results   Component Value Date/Time    Hemoglobin A1c 6.5 (H) 02/16/2019 10:37 AM      Lab Results   Component Value Date/Time    TSH 1.300 02/16/2019 10:37 AM      Lab Results   Component Value Date/Time    Vitamin D 25-Hydroxy 7.4 (L) 05/11/2016 07:14 AM    VITAMIN D, 25-HYDROXY 24.3 (L) 02/16/2019 10:37 AM          ASSESSMENT/PLAN:  Diagnoses and all orders for this visit:    1. Essential hypertension  - Stable, continue current regimen for now  - Continue to work on weight loss     2. Mixed hyperlipidemia   - Again, expressed to patient that I really think she should resume statin therapy. She had previously tolerated Crestor without difficulty, and I'm not sure why she is so resistant, however she does seem determined to manage her health with dietary modifications  - Explained that she has multiple cardiovascular risk factors and that her risk of MI/CVA could be reduced with the introduction of a statin  - She will meet with a nutritionist, and if LDL not at goal at next visit, she agrees to resume Crestor  Orders:    -     REFERRAL TO NUTRITION    3. Mild persistent asthma without complication  - Stable, albuterol refilled  Orders:    -     albuterol (PROVENTIL HFA, VENTOLIN HFA, PROAIR HFA) 90 mcg/actuation inhaler; Take 2 Puffs by inhalation every four (4) hours as needed for Wheezing. 4. Controlled type 2 diabetes mellitus without complication, without long-term current use of insulin ([address]  - Long discussion today regarding the spectrum of prediabetes-diabetes. Technically an A1c of 6.5% makes a new diagnosis of Type 2 diabetes  - We discussed various ways to manage this, and specifically discussed the importance of weight loss, regular exercise and diabetic diet. She will focus on eating lean protein and non-starchy vegetables, and will meet with a nutritionist  - Patient elects to hold off on medications for now as she would like to see if she can lower her A1c through lifestyle modification alone  Orders:    -     METABOLIC PANEL, COMPREHENSIVE; Future  -     LIPID PANEL;  Future  - HEMOGLOBIN A1C W/O EAG; Future  -     MICROALBUMIN, UR, RAND W/ MICROALB/CREAT RATIO; Future  -     REFERRAL TO NUTRITION    5. DDD (degenerative disc disease), lumbar  - Encouraged patient to f/u with Dr. Sita Bains  - DMV form completed today     6. Vitamin D deficiency  - Start vit d supplement  - re-check level with next set of labs  Orders:    -     VITAMIN D, 25 HYDROXY  -     ergocalciferol (DRISDOL) 50,000 unit capsule; Take 1 Cap by mouth every seven (7) days. 7. Vaginal discharge  - Suspect BV based on history and exam findings  - Treat empirically with Flagyl as below  - Will send nuswab for confirmation  Orders:    -     NUSWAB VAGINITIS PLUS; Future  -     metroNIDAZOLE (FLAGYL) 500 mg tablet; Take 1 Tab by mouth two (2) times a day for 7 days. 8. BMI 40.0-44.9, adult ([facility] [address]  - Extensive conversation regarding diet and lifestyle changes today  - patient is being referred for nutrition therapy       Follow-up Disposition:  Return in about 3 months (around 5/20/2019) for fasting labs due, 1 week prior. All questions answered. Patient expresses understanding and agrees with the plan as detailed above.     PATIENT CARE TEAM:   Patient Care Team:  NIMA Larios as PCP - Viral Rivas, Milena Marion MD (General Surgery)       Mariaa Wylie, [address]   February 20, 2019   5:12 PM

## 2019-02-21 DIAGNOSIS — E11.9 CONTROLLED TYPE 2 DIABETES MELLITUS WITHOUT COMPLICATION, WITHOUT LONG-TERM CURRENT USE OF INSULIN (HCC): ICD-10-CM

## 2019-03-06 ENCOUNTER — TELEPHONE (OUTPATIENT)
Dept: FAMILY MEDICINE CLINIC | Age: 54
End: 2019-03-06

## 2019-03-06 NOTE — TELEPHONE ENCOUNTER
Spoke with patient to obtain eye doctor's name so that eye exam can be requested. Name of doctor obtained and fax sent to request office note. During phone conversation patient also requested lab results. Blood work has been finalized and is in patient's chart but not reviewed. Nuswab result was also requested by patient. She reports she has taken the Flagyl that was prescribed at her visit on 2/20/19 but has not received a call about the results. No nuswab result was found in patient's chart. Contacted [facility] and [facility]. No results for patient found at either lab.

## 2019-03-06 NOTE — TELEPHONE ENCOUNTER
Please advise patient that there has been a delay with her nuswab but we should have results next week. Is she feeling better after completion of the Flagyl? Lab results were discussed in person at her last office visit. She is due to repeat fasting labs in 3 mo prior to her next appointment.

## 2019-03-06 NOTE — TELEPHONE ENCOUNTER
Pt returned call and I advised that her Nuswab would be delayed on results. Pt states that she is feeling better as of right mow. Pt vioced understanding and had no further questions.

## 2019-03-06 NOTE — TELEPHONE ENCOUNTER
Spoke with Shira Conklin at [facility] who again confirms that patient's Gisele Floor has not been received. [facility] after hours specimen box checked. Specimen was left there. Per Gena [facility] is valid for 30 days after specimen is collected. Specimen will be sent off today.

## 2019-03-08 LAB
A VAGINAE DNA VAG QL NAA+PROBE: ABNORMAL SCORE
BVAB2 DNA VAG QL NAA+PROBE: ABNORMAL SCORE
C ALBICANS DNA VAG QL NAA+PROBE: NEGATIVE
C GLABRATA DNA VAG QL NAA+PROBE: NEGATIVE
C TRACH RRNA SPEC QL NAA+PROBE: NEGATIVE
MEGA1 DNA VAG QL NAA+PROBE: ABNORMAL SCORE
N GONORRHOEA RRNA SPEC QL NAA+PROBE: NEGATIVE
T VAGINALIS RRNA SPEC QL NAA+PROBE: NEGATIVE

## 2019-03-11 ENCOUNTER — TELEPHONE (OUTPATIENT)
Dept: FAMILY MEDICINE CLINIC | Age: 54
End: 2019-03-11

## 2019-03-11 NOTE — TELEPHONE ENCOUNTER
Pt states that she is returning a call. She states that if you can't reach her it is ok to call her  Hammad Barrientos [phone] and let him know what needs to be done.  Thank you

## 2019-03-12 NOTE — TELEPHONE ENCOUNTER
Pt is returning call again and she thinks that it is about her results of the Nuswab. She states that it is ok to LVM because she is at work and it is hard for her to get to the phone.

## 2019-03-13 NOTE — TELEPHONE ENCOUNTER
Left patient detailed message that Nuswab was positive for BV and she should complete the full coarse of Flagyl.

## 2019-03-16 NOTE — PROGRESS NOTES
[phone] to call [facility].

## 2019-03-18 NOTE — PROGRESS NOTES
Patient identifiers verified. Patient advised that Nuswab confirms BV. Patient should complete the full course of Flagyl as directed. She voices understanding.

## 2019-05-14 ENCOUNTER — HOSPITAL ENCOUNTER (OUTPATIENT)
Dept: NUTRITION | Age: 54
Discharge: HOME | End: 2019-05-14
Payer: COMMERCIAL

## 2019-05-14 PROCEDURE — 97802 MEDICAL NUTRITION INDIV IN: CPT

## 2019-05-14 NOTE — PROGRESS NOTES
[address] [place] [address] Ave, [address] [facility], [address] [address] Phone: [phone]  Fax: [phone] Nutrition Assessment  Medical Nutrition Therapy Outpatient Initial Evaluation Patient Name: Darrell Giraldo : 1965 Treatment Diagnosis: Type 2 DM, High chol Referral Source: Anne Marie Ramos MD Start of Care [facility]): 2019 Gender: female Age: 47 y.o. Ht: 60 in Wt:  221 lb  kg BMI:  BMR Male  [facility] Female Anthropometrics Assessment: Per BMI, pt is considered morbidly obese. Moderate abdominal adiposity is evident based on visual observation Past Medical History includes: See above Pertinent Medications:  
See [facility] Biochemical Data:  
Lab Results Component Value Date/Time Hemoglobin A1c 6.5 (H) 2019 10:37 AM  
 
Lab Results Component Value Date/Time Cholesterol, total 193 2019 10:37 AM  
 HDL Cholesterol 40 2019 10:37 AM  
 LDL, calculated 138 (H) 2019 10:37 AM  
 VLDL, calculated 15 2019 10:37 AM  
 Triglyceride 74 2019 10:37 AM  
 CHOL/HDL Ratio 5.4 (H) 2017 10:09 AM  
 
Lab Results Component Value Date/Time ALT (SGPT) 17 2019 10:37 AM  
 AST (SGOT) 14 2019 10:37 AM  
 Alk. phosphatase 96 2019 10:37 AM  
 Bilirubin, total 0.3 2019 10:37 AM  
 
Lab Results Component Value Date/Time Creatinine 0.80 2019 10:37 AM  
 
Lab Results Component Value Date/Time BUN 11 2019 10:37 AM  
 BUN, POC 5 (L) 2017 09:34 AM  
 
No results found for: MCACR, MCA1, MCA2, MCA3, MCAU, MCAU2, MCALPOCT Subjective/Assessment: 
 Pt is here seeking education for type 2 DM control as well as weight loss and healthy lifestyle changes. Pt and  follow a flexitarian diet (mostly plant based with fish and seafood 3-4 times per week).  Pt has a good understanding of foods, but is concerned with recent prediabetes as well as wants to lose weight. She drinks all water with soda as a treat monthly. She admits to a sweet tooth as well as skipping breakfast. Thought she and her  have been making smoothies in the morning. Anticipate that pt is only paying attention to calorie count and not where those calories are coming from. Discussed appropriate balance with macros and watching the carb count especially in meat alternatives. Pt had wonderful questions as well as provided her with healthy food product choices as well as some recipes. Pt expressed comprehension, high motivation, and compliance is expected. Current Eating Patterns: See Note sheet in chart Estimate Needs Calories:  N/A Protein: 0 Carbs: 0 Fat: 0 Kcal/day  g/day  g/day  g/day   
    percent: 30  40  30 Education & Recommendations provided: Educated pt on the pathophysiology of Type II Diabetes, insulin resistance and the rationale for dietary modifications and increased activity. Educated pt on carbohydrate food sources, counting carbohydrates, label reading, meal timing, and appropriate serving sizes. Encouraged pt to avoid sugary beverages. Handouts Provided: [x]  Carbohydrates [x]  Protein []  Fiber 
[x]  Serving Sizes [x]  Meal and Snack Ideas 
[]  Food Journals [x]  Diabetes []  Cholesterol 
[]  Sodium 
[]  Gen Nutr Guidelines 
[]  SBGM Guidelines 
[x]  Others: Vegan protein guide, snacks Information Reviewed with: Patient Readiness to Change Stage: []  Pre-contemplative    []  Contemplative 
[]  Preparation               [x]  Action                  []  Maintenance Potential Barriers to Learning: []  Decline in memory    []  Language barrier   []  Other: 
[]  Emotional                  []  Limited mobility 
[]  Lack of motivation     [] Vision, hearing or cognitive impairment Expected Compliance: Good Nutritional Goal - To promote lifestyle changes to result in:   
[x]  Weight loss [x]  Improved diabetic control 
[]  Decreased cholesterol levels 
[]  Decreased blood pressure 
[]  Weight maintenance []  Preventing any interactions associated with food allergies []  Adequate weight gain toward goal weight 
[]  Other:  
  
 
Patient Goals: SMART goals 1. Follow good meal timing; space meals and snacks by 2-3 hours (see chart for example) No skipping!! 
2. Focus on good sources of protein; Never eat carbs alone, always pair them with protein,Have higher carb meals earlier in the day and taper off at dinner meal 
3. Follow the ratio rule: for q 2 gm carb:1 gm Protein (minimum); it is also okay to have a 1:1 ratio 4. Exercise: aim for 150 min per week divided as schedule permits Dietitian Signature: Rocio Kaur MA, RD Date: 5/14/2019 Follow-up: June 19 @ 1500 Time: 2:13 PM

## 2019-05-18 ENCOUNTER — HOSPITAL ENCOUNTER (OUTPATIENT)
Dept: LAB | Age: 54
Discharge: HOME | End: 2019-05-18

## 2019-05-18 LAB — XX-LABCORP SPECIMEN COL,LCBCF: NORMAL

## 2019-05-18 PROCEDURE — 99001 SPECIMEN HANDLING PT-LAB: CPT

## 2019-05-20 DIAGNOSIS — E11.9 CONTROLLED TYPE 2 DIABETES MELLITUS WITHOUT COMPLICATION, WITHOUT LONG-TERM CURRENT USE OF INSULIN (HCC): ICD-10-CM

## 2019-05-20 LAB
25(OH)D3+25(OH)D2 SERPL-MCNC: 37.7 NG/ML (ref 30–100)
ALBUMIN SERPL-MCNC: 3.8 G/DL (ref 3.5–5.5)
ALBUMIN/CREAT UR: 8 MG/G CREAT (ref 0–30)
ALBUMIN/GLOB SERPL: 1 {RATIO} (ref 1.2–2.2)
ALP SERPL-CCNC: 95 IU/L (ref 39–117)
ALT SERPL-CCNC: 21 IU/L (ref 0–32)
AST SERPL-CCNC: 19 IU/L (ref 0–40)
BILIRUB SERPL-MCNC: 0.4 MG/DL (ref 0–1.2)
BUN SERPL-MCNC: 10 MG/DL (ref 6–24)
BUN/CREAT SERPL: 13 (ref 9–23)
CALCIUM SERPL-MCNC: 9.4 MG/DL (ref 8.7–10.2)
CHLORIDE SERPL-SCNC: 99 MMOL/L (ref 96–106)
CHOLEST SERPL-MCNC: 207 MG/DL (ref 100–199)
CO2 SERPL-SCNC: 26 MMOL/L (ref 20–29)
CREAT SERPL-MCNC: 0.76 MG/DL (ref 0.57–1)
CREAT UR-MCNC: 119.1 MG/DL
GLOBULIN SER CALC-MCNC: 3.8 G/DL (ref 1.5–4.5)
GLUCOSE SERPL-MCNC: 104 MG/DL (ref 65–99)
HBA1C MFR BLD: 6.3 % (ref 4.8–5.6)
HDLC SERPL-MCNC: 41 MG/DL
INTERPRETATION, 910389: NORMAL
LDLC SERPL CALC-MCNC: 149 MG/DL (ref 0–99)
MICROALBUMIN UR-MCNC: 9.5 UG/ML
POTASSIUM SERPL-SCNC: 4.4 MMOL/L (ref 3.5–5.2)
PROT SERPL-MCNC: 7.6 G/DL (ref 6–8.5)
SODIUM SERPL-SCNC: 139 MMOL/L (ref 134–144)
TRIGL SERPL-MCNC: 87 MG/DL (ref 0–149)
VLDLC SERPL CALC-MCNC: 17 MG/DL (ref 5–40)

## 2019-05-21 ENCOUNTER — OFFICE VISIT (OUTPATIENT)
Dept: FAMILY MEDICINE CLINIC | Age: 54
End: 2019-05-21

## 2019-05-21 VITALS
WEIGHT: 219.4 LBS | RESPIRATION RATE: 16 BRPM | BODY MASS INDEX: 43.07 KG/M2 | SYSTOLIC BLOOD PRESSURE: 134 MMHG | DIASTOLIC BLOOD PRESSURE: 70 MMHG | HEART RATE: 79 BPM | OXYGEN SATURATION: 99 % | HEIGHT: 60 IN | TEMPERATURE: 99.2 F

## 2019-05-21 DIAGNOSIS — E78.2 MIXED HYPERLIPIDEMIA: ICD-10-CM

## 2019-05-21 DIAGNOSIS — M51.36 DDD (DEGENERATIVE DISC DISEASE), LUMBAR: ICD-10-CM

## 2019-05-21 DIAGNOSIS — J45.30 MILD PERSISTENT ASTHMA WITHOUT COMPLICATION: ICD-10-CM

## 2019-05-21 DIAGNOSIS — E11.9 CONTROLLED TYPE 2 DIABETES MELLITUS WITHOUT COMPLICATION, WITHOUT LONG-TERM CURRENT USE OF INSULIN (HCC): Primary | ICD-10-CM

## 2019-05-21 DIAGNOSIS — I10 ESSENTIAL HYPERTENSION: ICD-10-CM

## 2019-05-21 RX ORDER — LISINOPRIL AND HYDROCHLOROTHIAZIDE 10; 12.5 MG/1; MG/1
1 TABLET ORAL DAILY
Qty: 30 TAB | Refills: 5 | Status: SHIPPED | OUTPATIENT
Start: 2019-05-21 | End: 2019-08-21 | Stop reason: SDUPTHER

## 2019-05-21 RX ORDER — KETOCONAZOLE 20 MG/G
CREAM TOPICAL
Refills: 3 | COMMUNITY
Start: 2019-04-11

## 2019-05-21 NOTE — PROGRESS NOTES
Patient: Raysa Pro MRN: [id number]  SSN: [id number]    YOB: 1965  Age: 47 y.o. Sex: female      Date of Service: 5/21/2019   Provider: NIMA Weber   Office Location:   [address], [address]  Office Phone: [phone]  Office Fax: [phone]      REASON FOR VISIT:   Chief Complaint   Patient presents with    Hypertension    Cholesterol Problem    Asthma    Diabetes    Vitamin D Deficiency    Other     DDD follow-up    Tingling     upper left thigh x 2 weeks        VITALS:   Visit Vitals  /70 (BP 1 Location: Left arm, BP Patient Position: Sitting)   Pulse 79   Temp 99.2 °F (37.3 °C) (Oral)   Resp 16   Ht 5' (1.524 m)   Wt 219 lb 6.4 oz (99.5 kg)   LMP 05/11/2019 Comment: patient had spotting tues and wed   SpO2 99%   BMI 42.85 kg/m²        MEDICATIONS:   Current Outpatient Medications on File Prior to Visit   Medication Sig Dispense Refill    ketoconazole (NIZORAL) 2 % topical cream   3    ergocalciferol (DRISDOL) 50,000 unit capsule Take 1 Cap by mouth every seven (7) days. 12 Cap 0    albuterol (PROVENTIL HFA, VENTOLIN HFA, PROAIR HFA) 90 mcg/actuation inhaler Take 2 Puffs by inhalation every four (4) hours as needed for Wheezing. 1 Inhaler 5    fluticasone-salmeterol (ADVAIR) 100-50 mcg/dose diskus inhaler Take 1 Puff by inhalation every twelve (12) hours. 1 Inhaler 5    lisinopril-hydroCHLOROthiazide (PRINZIDE, ZESTORETIC) 10-12.5 mg per tablet Take 1 Tab by mouth daily. 30 Tab 5    mometasone (NASONEX) 50 mcg/actuation nasal spray 2 Sprays by Both Nostrils route daily. 1 Container 0    aspirin 81 mg chewable tablet Take 1 Tab by mouth daily. 30 Tab 0    Omega-3 Fatty Acids (FISH OIL) 300 mg cap Take  by mouth.  diazePAM (VALIUM) 5 mg tablet Take 1 tablet 30 minutes before procedure-- may repeat x 1 2 Tab 0     No current facility-administered medications on file prior to visit. ALLERGIES:   Allergies   Allergen Reactions    Motrin [Ibuprofen] Other (comments)     Directed not to take by physician. MEDICAL/SURGICAL HISTORY:  Past Medical History:   Diagnosis Date    Allergic rhinitis 11/9/2009    Asthma 2015    PFT confirmed    CAD (coronary artery disease)     hx mi-2013 ?  Carpal tunnel syndrome 11/9/2009    Chronic low back pain without sciatica 5/17/2016    Fibroid, uterine 11/9/2009    GERD (gastroesophageal reflux disease) 11/9/2009    Heart attack ([address]     MI approx 2013    HLD (hyperlipidemia) 11/9/2009    Hypertension     Hypertriglyceridemia 5/17/2016    Hypothyroidism 5/17/2016      Past Surgical History:   Procedure Laterality Date    COLONOSCOPY N/A 6/1/2017    COLONOSCOPY with Polypectomy performed by Sudhir Sloan MD at [address] HX BREAST BIOPSY      benign, left    HX GYN      fibroids removed    HX MYOMECTOMY      open first, then embolization 2nd time.  HX OTHER SURGICAL      cyst removed    HX TONSILLECTOMY      DC COLONOSCOPY W/BIOPSY SINGLE/MULTIPLE N/A 06/01/2017    Dr. Bernice Turner:  Family History   Problem Relation Age of Onset    Hypertension Mother     Stroke Father         mini strokes, not sure of number    Hypertension Father     Seizures Father     Cancer Maternal Grandfather         not sure type        SOCIAL HISTORY:  Social History     Tobacco Use    Smoking status: Never Smoker    Smokeless tobacco: Never Used   Substance Use Topics    Alcohol use: No     Alcohol/week: 0.0 oz    Drug use: No             HISTORY OF PRESENT ILLNESS: Darrell Giraldo is a 47 y.o. female who presents to the office for a routine follow up visit. Diabetes -  Currently the patient's condition is stable, improved slightly   Last A1c: 6.3% on 5/18/19  Not currently on any meds, focusing on diet and exercise.       Last urine microalbumin: 5/18/19, negative screening   Last lipid panel: 5/18/19,  at that time. Patient is NOT on statin therapy at her request (see below)   Last foot exam: never. Newly diagnosed DM   Last eye exam: unknown     Hypertension -   Stable, BP at goal today   Reports compliance with the following regimen: lisinopril-HCTZ 10-12.5 mg daily  Home BP readings: not checking. Lifestyle modifications: trying to follow a plant based diet       Hyperlipidemia -   In the setting of possible CAD -- patient reports she had an MI in 2013, but to date we have no record of this. Patient has been resistant to statin therapy despite her CV risk, and I had told her at her last visit that if she could get her LDL under 100 with diet and exercise, then she did not need to take the statin. She has been able to drop her LDL from 148 in March 2018 to 90 in August 2018  Unfortunately, her most recent fasting lipid panel from 5/18/19 shows that LDL has increased to 149. She did meet with a nutritionist last week and was given some good advice regarding her daily macronutrient intake. She has a follow up scheduled for next month.      Asthma -   Mild-moderate persistent  Generally well controlled on Advair with albuterol PRN. Denies fevers, chest pain, SOB. Low Back Pain -   Patient continues to complain of bilateral low back pain. She completed physical therapy in May and did not find it to be very helpful. MRI in November showed advanced L4/L5 facet arthropathy, mild degenerative disc changes and mild central canal and foraminal stenosis. She is being followed by Dr. Navjot Hastings at [facility], but missed her appt in January and still has not rescheduled. Now with some complaints of tingling in her left thigh. REVIEW OF SYSTEMS:  Review of Systems   Constitutional: Negative for chills, fever and malaise/fatigue. Respiratory: Negative for cough, shortness of breath and wheezing. Cardiovascular: Negative for chest pain, palpitations and leg swelling.    Musculoskeletal: Positive for back pain.        PHYSICAL EXAMINATION:  Physical Exam   Constitutional: She is oriented to person, place, and time and well-developed, well-nourished, and in no distress. Cardiovascular: Normal rate, regular rhythm and normal heart sounds. Exam reveals no gallop and no friction rub. No murmur heard. Pulmonary/Chest: Effort normal and breath sounds normal. She has no wheezes. She has no rales. Neurological: She is alert and oriented to person, place, and time. Gait normal.   Skin: Skin is warm and dry. No rash noted. Psychiatric: Mood, memory and affect normal.        RESULTS:  Lab Results   Component Value Date/Time    Sodium 139 05/18/2019 12:00 AM    Potassium 4.4 05/18/2019 12:00 AM    Chloride 99 05/18/2019 12:00 AM    CO2 26 05/18/2019 12:00 AM    Anion gap 9 09/10/2017 11:38 AM    Glucose 104 (H) 05/18/2019 12:00 AM    BUN 10 05/18/2019 12:00 AM    Creatinine 0.76 05/18/2019 12:00 AM    BUN/Creatinine ratio 13 05/18/2019 12:00 AM    GFR est  05/18/2019 12:00 AM    GFR est non-AA 89 05/18/2019 12:00 AM    Calcium 9.4 05/18/2019 12:00 AM    Bilirubin, total 0.4 05/18/2019 12:00 AM    AST (SGOT) 19 05/18/2019 12:00 AM    Alk.  phosphatase 95 05/18/2019 12:00 AM    Protein, total 7.6 05/18/2019 12:00 AM    Albumin 3.8 05/18/2019 12:00 AM    Globulin 5.4 (H) 09/10/2017 11:38 AM    A-G Ratio 1.0 (L) 05/18/2019 12:00 AM    ALT (SGPT) 21 05/18/2019 12:00 AM      Lab Results   Component Value Date/Time    Cholesterol, total 207 (H) 05/18/2019 12:00 AM    HDL Cholesterol 41 05/18/2019 12:00 AM    LDL, calculated 149 (H) 05/18/2019 12:00 AM    VLDL, calculated 17 05/18/2019 12:00 AM    Triglyceride 87 05/18/2019 12:00 AM    CHOL/HDL Ratio 5.4 (H) 01/13/2017 10:09 AM      Lab Results   Component Value Date/Time    Hemoglobin A1c 6.3 (H) 05/18/2019 12:00 AM      Lab Results   Component Value Date/Time    Vitamin D 25-Hydroxy 7.4 (L) 05/11/2016 07:14 AM    VITAMIN D, 25-HYDROXY 37.7 05/18/2019 12:00 AM ASSESSMENT/PLAN:  Diagnoses and all orders for this visit:    1. Controlled type 2 diabetes mellitus without complication, without long-term current use of insulin ([address]  - Doing well, A1c has improved slightly with her diet and weight loss efforts. Will continue to monitor closely  - follow plan of care per nutritionist  - return in 3 mo with fasting labs prior   Orders:    -     METABOLIC PANEL, COMPREHENSIVE; Future  -     LIPID PANEL; Future  -     HEMOGLOBIN A1C W/O EAG; Future    2. Essential hypertension  - Stable, continue current regimen  Orders:    -     lisinopril-hydroCHLOROthiazide (PRINZIDE, ZESTORETIC) 10-12.5 mg per tablet; Take 1 Tab by mouth daily. 3. Mixed hyperlipidemia  - Lengthy discussion again today regarding the risks and benefits of statin therapy. Patient has numerous cardiovascular risk factors including a questionable hx of CAD which we've not been able to corroborate. She is still adamant that she does not want to be on a statin. I've given her a handout to read and she will do some research on her own time. I've tried to dispell some of the misinformation that she's heard about statins to the best of my ability  - For now, she will continue to work with her nutritionist and we will continue to check lipids periodically  Orders:    -     METABOLIC PANEL, COMPREHENSIVE; Future  -     LIPID PANEL; Future    4. Mild persistent asthma without complication  - stable, continue inhalers     5. DDD (degenerative disc disease), lumbar  - advised to schedule f/u with Dr. Vazquez Lucas and Dispositions    · Return in about 3 months (around 8/21/2019) for routine care and fasting labs one week prior to visit. All questions answered. Patient expresses understanding and agrees with the plan as detailed above.     PATIENT CARE TEAM:   Patient Care Team:  NIMA Bejarano as PCP - Nilam Nick MD (General Surgery)       Eve Yates 21, 2019   5:31 PM

## 2019-05-21 NOTE — PROGRESS NOTES
1. Have you been to the ER, urgent care clinic since your last visit? Hospitalized since your last visit? No    2. Have you seen or consulted any other health care providers outside of the [address] [place] [address] since your last visit? Include any pap smears or colon screening.  Dr. Rosemary [facility] LOV:  4/10/19    Chief Complaint   Patient presents with    Hypertension    Cholesterol Problem    Asthma    Diabetes    Vitamin D Deficiency    Other     DDD follow-up    Tingling     upper left thigh x 2 weeks

## 2019-05-21 NOTE — PATIENT INSTRUCTIONS
Statins: Care Instructions  Your Care Instructions    Statins are medicines that lower your cholesterol and your risk for a heart attack and stroke. Cholesterol is a type of fat in your blood. If you have too much cholesterol, it can build up in blood vessels. This raises your risk of heart disease, heart attack, and stroke. Statins lower cholesterol by blocking how much your body makes. This prevents cholesterol from building up in your blood vessels. This is called hardening of the arteries. It is the starting point for some heart and blood flow problems, such as heart disease. Statins may also reduce inflammation around the buildup (called plaque). This can lower the risk that the plaque will break apart and lead to a heart attack or stroke. A heart-healthy lifestyle is important for lowering your risk whether you take statins or not. This includes eating healthy foods, being active, staying at a healthy weight, and not smoking. You must take statins regularly for them to work well. If you stop, your cholesterol and your risk will go back up. Examples of statins include:  · Atorvastatin (Lipitor). · Lovastatin (Mevacor). · Pravastatin (Pravachol). · Simvastatin (Zocor). Statins interact with many medicines. So tell your doctor all of the other medicines that you take. These include prescription medicines, over-the-counter medicines, dietary supplements, and herbal products. Follow-up care is a key part of your treatment and safety. Be sure to make and go to all appointments, and call your doctor if you are having problems. It's also a good idea to know your test results and keep a list of the medicines you take. How can you care for yourself at home? · Take statins exactly as your doctor tells you. High cholesterol has no symptoms. So it is easy to forget to take the pills. Try to make a system that reminds you to take them.   · Do not take two or more medicines at the same time unless the doctor told you to. Statins can interact with other medicines. · Always tell your doctor if you think you are having a side effect. If side effects are a problem with one medicine, a different one may be used. · Keep making the lifestyle changes your doctor suggests. Eat heart-healthy foods, be active, don't smoke, and stay at a healthy weight. · Talk to your doctor about avoiding grapefruit juice if you take statins. Grapefruit juice can raise the level of this medicine in your blood. This could increase side effects. When should you call for help? Watch closely for changes in your health, and be sure to contact your doctor if:    · You think you are having problems with your medicine.     · You have aches or muscle pain. Where can you learn more? Go to http://yosef-steph.info/. Enter R358 in the search box to learn more about \"Statins: Care Instructions. \"  Current as of: July 22, 2018  Content Version: 11.9  © 1841-7599 [employer], [employer]. Care instructions adapted under license by [employer] (which disclaims liability or warranty for this information). If you have questions about a medical condition or this instruction, always ask your healthcare professional. [address] any warranty or liability for your use of this information.

## 2019-06-19 ENCOUNTER — HOSPITAL ENCOUNTER (OUTPATIENT)
Dept: NUTRITION | Age: 54
Discharge: HOME | End: 2019-06-19
Payer: COMMERCIAL

## 2019-06-19 PROCEDURE — 97803 MED NUTRITION INDIV SUBSEQ: CPT

## 2019-06-19 NOTE — PROGRESS NOTES
NUTRITION  FOLLOW-UP TREATMENT NOTE  Patient Name: Edgar Lehman         Date: 2019  : 1965    YES Patient  Verified  Diagnosis: Diabetes, HTN   In time:   1500             Out time:   1530   Total Treatment Time (min):  30     SUBJECTIVE/ASSESSMENT    Changes in medication or medical history? Any new allergies, surgeries or procedures? NO    If yes, update Summary List   No change         Current Wt: 226 Previous Wt: 221 Wt Change: +5     Achievement of Goals: Pt has been working on increasing her water intake, portion control, meal times, and the other goals set at her initial visit. She is highly motivated to make a change. She is walking 3 times per week, and brought food examples to show what she has been eating and ask whether they are good choices. Based off food recall pt is consuming a lot of fruit. Discussed cutting back to 2 servings per day and discussed how to balance them. Also calculated BMR and macros for pt to have more structured guidelines to follow not that she has had time to learn balance and potion control. Pt expressed comprehension, high motivation, and compliance is expected.          Patient Education:  [x]  Review current plan with patient   []  Other:    Handouts/  Information Provided: []  Carbohydrates  []  Protein  []  Fiber  []  Serving Sizes  []  Fluids  []  General guidelines []  Diabetes  []  Cholesterol  []  Sodium  []  SBGM  []  Food Journals  []  Others:      New Patient Goals: - cut back on fruit to 2 servings per day; breakfast and either lunch or afternoon snack  - maintain a food journal with notebook, provided sites to look up kcal and macro  -Carb ranges: 30-35 gm @ meals, 15-20 gm @ snacks     PLAN    []  Continue on current plan []  Follow-up PRN   []  Discharge due to :    [x]  Next appt:  @ [id number] [address]     Dietitian: Ro Tang MA, RD    Date: 2019 Time: 3:02 PM

## 2019-07-30 ENCOUNTER — APPOINTMENT (OUTPATIENT)
Dept: NUTRITION | Age: 54
End: 2019-07-30

## 2019-08-07 DIAGNOSIS — E78.2 MIXED HYPERLIPIDEMIA: ICD-10-CM

## 2019-08-07 DIAGNOSIS — E11.9 CONTROLLED TYPE 2 DIABETES MELLITUS WITHOUT COMPLICATION, WITHOUT LONG-TERM CURRENT USE OF INSULIN (HCC): ICD-10-CM

## 2019-08-17 ENCOUNTER — HOSPITAL ENCOUNTER (OUTPATIENT)
Dept: LAB | Age: 54
Discharge: HOME | End: 2019-08-17

## 2019-08-17 LAB — XX-LABCORP SPECIMEN COL,LCBCF: NORMAL

## 2019-08-17 PROCEDURE — 99001 SPECIMEN HANDLING PT-LAB: CPT

## 2019-08-19 LAB
ALBUMIN SERPL-MCNC: 3.7 G/DL (ref 3.5–5.5)
ALBUMIN/GLOB SERPL: 1.1 {RATIO} (ref 1.2–2.2)
ALP SERPL-CCNC: 94 IU/L (ref 39–117)
ALT SERPL-CCNC: 16 IU/L (ref 0–32)
AST SERPL-CCNC: 10 IU/L (ref 0–40)
BILIRUB SERPL-MCNC: 0.3 MG/DL (ref 0–1.2)
BUN SERPL-MCNC: 9 MG/DL (ref 6–24)
BUN/CREAT SERPL: 11 (ref 9–23)
CALCIUM SERPL-MCNC: 9.1 MG/DL (ref 8.7–10.2)
CHLORIDE SERPL-SCNC: 102 MMOL/L (ref 96–106)
CHOLEST SERPL-MCNC: 130 MG/DL (ref 100–199)
CO2 SERPL-SCNC: 26 MMOL/L (ref 20–29)
CREAT SERPL-MCNC: 0.8 MG/DL (ref 0.57–1)
GLOBULIN SER CALC-MCNC: 3.4 G/DL (ref 1.5–4.5)
GLUCOSE SERPL-MCNC: 111 MG/DL (ref 65–99)
HBA1C MFR BLD: 6.4 % (ref 4.8–5.6)
HDLC SERPL-MCNC: 38 MG/DL
INTERPRETATION, 910389: NORMAL
LDLC SERPL CALC-MCNC: 78 MG/DL (ref 0–99)
Lab: NORMAL
POTASSIUM SERPL-SCNC: 4.5 MMOL/L (ref 3.5–5.2)
PROT SERPL-MCNC: 7.1 G/DL (ref 6–8.5)
SODIUM SERPL-SCNC: 143 MMOL/L (ref 134–144)
SPECIMEN STATUS REPORT, ROLRST: NORMAL
TRIGL SERPL-MCNC: 72 MG/DL (ref 0–149)
VLDLC SERPL CALC-MCNC: 14 MG/DL (ref 5–40)

## 2019-08-21 ENCOUNTER — OFFICE VISIT (OUTPATIENT)
Dept: FAMILY MEDICINE CLINIC | Age: 54
End: 2019-08-21

## 2019-08-21 VITALS
WEIGHT: 227.13 LBS | HEIGHT: 60 IN | TEMPERATURE: 98.8 F | RESPIRATION RATE: 16 BRPM | OXYGEN SATURATION: 99 % | SYSTOLIC BLOOD PRESSURE: 128 MMHG | HEART RATE: 81 BPM | BODY MASS INDEX: 44.59 KG/M2 | DIASTOLIC BLOOD PRESSURE: 72 MMHG

## 2019-08-21 DIAGNOSIS — M51.36 DDD (DEGENERATIVE DISC DISEASE), LUMBAR: ICD-10-CM

## 2019-08-21 DIAGNOSIS — E11.9 CONTROLLED TYPE 2 DIABETES MELLITUS WITHOUT COMPLICATION, WITHOUT LONG-TERM CURRENT USE OF INSULIN (HCC): Primary | ICD-10-CM

## 2019-08-21 DIAGNOSIS — J45.30 MILD PERSISTENT ASTHMA WITHOUT COMPLICATION: ICD-10-CM

## 2019-08-21 DIAGNOSIS — E66.01 MORBID OBESITY (HCC): ICD-10-CM

## 2019-08-21 DIAGNOSIS — I10 ESSENTIAL HYPERTENSION: ICD-10-CM

## 2019-08-21 DIAGNOSIS — E78.2 MIXED HYPERLIPIDEMIA: ICD-10-CM

## 2019-08-21 DIAGNOSIS — Z12.39 SCREENING FOR BREAST CANCER: ICD-10-CM

## 2019-08-21 RX ORDER — LISINOPRIL AND HYDROCHLOROTHIAZIDE 10; 12.5 MG/1; MG/1
1 TABLET ORAL DAILY
Qty: 30 TAB | Refills: 5 | Status: SHIPPED | OUTPATIENT
Start: 2019-08-21 | End: 2020-03-03 | Stop reason: SDUPTHER

## 2019-08-21 RX ORDER — BUDESONIDE AND FORMOTEROL FUMARATE DIHYDRATE 80; 4.5 UG/1; UG/1
2 AEROSOL RESPIRATORY (INHALATION) 2 TIMES DAILY
Qty: 1 INHALER | Refills: 5 | Status: SHIPPED | OUTPATIENT
Start: 2019-08-21 | End: 2020-03-03 | Stop reason: SDUPTHER

## 2019-08-21 NOTE — PROGRESS NOTES
1. Have you been to the ER, urgent care clinic since your last visit? Hospitalized since your last visit? No    2. Have you seen or consulted any other health care providers outside of the [address] [place] [address] since your last visit? Include any pap smears or colon screening.  No       Annual eye exam: Dr. Dian Valdez in [facility]  Pneumococcal vaccine: N/A  Flu vaccine: pt declined 2017  Patient instructed to remove shoes: Yes    Health Maintenance Due   Topic Date Due    Pneumococcal 0-64 years (1 of 1 - PPSV23) 04/02/1971    FOOT EXAM Q1  04/02/1975    EYE EXAM RETINAL OR DILATED  04/02/1975    DTaP/Tdap/Td series (1 - Tdap) 04/02/1986    Shingrix Vaccine Age 50> (1 of 2) 04/02/2015    BREAST CANCER SCRN MAMMOGRAM  01/18/2018    Influenza Age 9 to Adult  08/01/2019

## 2019-08-21 NOTE — PROGRESS NOTES
Patient: Juan Carlos Estrada MRN: [id number]  SSN: [id number]    YOB: 1965  Age: 47 y.o. Sex: female      Date of Service: 8/21/2019   Provider: NIMA Romero   Office Location:   [address]ien rose, [address]  Office Phone: [phone]  Office Fax: [phone]      REASON FOR VISIT:   No chief complaint on file. VITALS:   Visit Vitals  /72 (BP 1 Location: Left arm, BP Patient Position: Sitting)   Pulse 81   Temp 98.8 °F (37.1 °C) (Oral)   Resp 16   Ht 5' (1.524 m)   Wt 227 lb 2 oz (103 kg)   LMP 05/11/2019 Comment: patient had spotting tues and wed   SpO2 99%   BMI 44.36 kg/m²        MEDICATIONS:   Current Outpatient Medications on File Prior to Visit   Medication Sig Dispense Refill    ketoconazole (NIZORAL) 2 % topical cream   3    ergocalciferol (DRISDOL) 50,000 unit capsule Take 1 Cap by mouth every seven (7) days. 12 Cap 0    aspirin 81 mg chewable tablet Take 1 Tab by mouth daily. 30 Tab 0    Omega-3 Fatty Acids (FISH OIL) 300 mg cap Take  by mouth.  albuterol (PROVENTIL HFA, VENTOLIN HFA, PROAIR HFA) 90 mcg/actuation inhaler Take 2 Puffs by inhalation every four (4) hours as needed for Wheezing. 1 Inhaler 5    fluticasone-salmeterol (ADVAIR) 100-50 mcg/dose diskus inhaler Take 1 Puff by inhalation every twelve (12) hours. 1 Inhaler 5    mometasone (NASONEX) 50 mcg/actuation nasal spray 2 Sprays by Both Nostrils route daily. 1 Container 0     No current facility-administered medications on file prior to visit. ALLERGIES:   Allergies   Allergen Reactions    Motrin [Ibuprofen] Other (comments)     Directed not to take by physician. MEDICAL/SURGICAL HISTORY:  Past Medical History:   Diagnosis Date    Allergic rhinitis 11/9/2009    Asthma 2015    PFT confirmed    CAD (coronary artery disease)     hx mi-2013 ?     Carpal tunnel syndrome 11/9/2009    Chronic low back pain without sciatica 5/17/2016    Fibroid, uterine 11/9/2009    GERD (gastroesophageal reflux disease) 11/9/2009    Heart attack ([facility] [address]     MI approx 2013    HLD (hyperlipidemia) 11/9/2009    Hypertension     Hypertriglyceridemia 5/17/2016    Hypothyroidism 5/17/2016      Past Surgical History:   Procedure Laterality Date    COLONOSCOPY N/A 6/1/2017    COLONOSCOPY with Polypectomy performed by Dee Liang MD at [address] HX BREAST BIOPSY      benign, left    HX GYN      fibroids removed    HX MYOMECTOMY      open first, then embolization 2nd time.  HX OTHER SURGICAL      cyst removed    HX TONSILLECTOMY      [place] COLONOSCOPY W/BIOPSY SINGLE/MULTIPLE N/A 06/01/2017    Dr. Chow Main[place]:  Family History   Problem Relation Age of Onset    Hypertension Mother     Stroke Father         mini strokes, not sure of number    Hypertension Father     Seizures Father     Cancer Maternal Grandfather         not sure type        SOCIAL HISTORY:  Social History     Tobacco Use    Smoking status: Never Smoker    Smokeless tobacco: Never Used   Substance Use Topics    Alcohol use: No     Alcohol/week: 0.0 standard drinks    Drug use: No             HISTORY OF PRESENT ILLNESS: Yuli Romero is a 47 y.o. female who presents to the office for a routine follow up visit. Diabetes -  Currently the patient's condition is stable, improved slightly   Last A1c: 6.4% on 8/17/19  Not currently on any meds, focusing on diet. She mentions that she would like to start exercising more, but her back bothers her too much.      Last urine microalbumin: 5/18/19, negative screening   Last lipid panel: 8/17/19, LDL 78 at that time. Patient is NOT on statin therapy at her request (see below)   Last foot exam: never.  Newly diagnosed DM   Last eye exam: unknown      Hypertension -   Stable, BP at goal today   Reports compliance with the following regimen: lisinopril-HCTZ 10-12.5 mg daily  Home BP readings: not checking. Lifestyle modifications: trying to follow a plant based diet       Hyperlipidemia -   In the setting of possible CAD -- patient reports she had an MI in 2013, but to date we have no record of this. Patient has been resistant to statin therapy despite her CV risk, and I had told her that if she could get her LDL under 100 with diet and exercise, then she did not need to take the statin. She has been able to drop her LDL from 148 in March 2018 to 90 in August 2018, but it then increased back to 149 in May 2019. She has since been working with a nutritionist, and has made some changes to her diet. Her most recent LDL was 78 on 8/17/19. She remains adamant that she does not want to take a statin.     Asthma -   Mild-moderate persistent  Generally well controlled on Advair with albuterol PRN, however she states her insurance is no longer covering Advair and that she will need to switch to something else. Denies fevers, chest pain, SOB.      Low Back Pain -   Patient continues to complain of bilateral low back pain. She completed physical therapy last year and did not find it to be very helpful. MRI in November showed advanced L4/L5 facet arthropathy, mild degenerative disc changes and mild central canal and foraminal stenosis. She is being followed by [facility]. Ras Rosen at [facility], but missed her appt in January and still has not rescheduled. She is requesting updated DMV handicap forms today. REVIEW OF SYSTEMS:  Review of Systems   Constitutional: Negative for chills, fever and malaise/fatigue. Respiratory: Negative for cough, shortness of breath and wheezing. Cardiovascular: Negative for chest pain, palpitations and leg swelling. PHYSICAL EXAMINATION:  Physical Exam   Constitutional: She is oriented to person, place, and time and well-developed, well-nourished, and in no distress. Cardiovascular: Normal rate, regular rhythm and normal heart sounds.  Exam reveals no gallop and no friction rub.   No murmur heard. Pulmonary/Chest: Effort normal and breath sounds normal. She has no wheezes. She has no rales. Neurological: She is alert and oriented to person, place, and time. Gait normal.   Skin: Skin is warm and dry. No rash noted. Psychiatric: Mood, memory and affect normal.   Diabetic Foot Exam: DP pulses 2+ symmetric, no open areas or skin breakdown noted, sensory intact to filament and positioning. Vibratory sensation not tested. Nails in good condition. RESULTS:  Lab Results   Component Value Date/Time    Sodium 143 08/17/2019 12:00 AM    Potassium 4.5 08/17/2019 12:00 AM    Chloride 102 08/17/2019 12:00 AM    CO2 26 08/17/2019 12:00 AM    Anion gap 9 09/10/2017 11:38 AM    Glucose 111 (H) 08/17/2019 12:00 AM    BUN 9 08/17/2019 12:00 AM    Creatinine 0.80 08/17/2019 12:00 AM    BUN/Creatinine ratio 11 08/17/2019 12:00 AM    GFR est AA 97 08/17/2019 12:00 AM    GFR est non-AA 84 08/17/2019 12:00 AM    Calcium 9.1 08/17/2019 12:00 AM    Bilirubin, total 0.3 08/17/2019 12:00 AM    AST (SGOT) 10 08/17/2019 12:00 AM    Alk. phosphatase 94 08/17/2019 12:00 AM    Protein, total 7.1 08/17/2019 12:00 AM    Albumin 3.7 08/17/2019 12:00 AM    Globulin 5.4 (H) 09/10/2017 11:38 AM    A-G Ratio 1.1 (L) 08/17/2019 12:00 AM    ALT (SGPT) 16 08/17/2019 12:00 AM      Lab Results   Component Value Date/Time    Cholesterol, total 130 08/17/2019 12:00 AM    HDL Cholesterol 38 (L) 08/17/2019 12:00 AM    LDL, calculated 78 08/17/2019 12:00 AM    VLDL, calculated 14 08/17/2019 12:00 AM    Triglyceride 72 08/17/2019 12:00 AM    CHOL/HDL Ratio 5.4 (H) 01/13/2017 10:09 AM      Lab Results   Component Value Date/Time    Hemoglobin A1c 6.4 (H) 08/17/2019 12:00 AM        ASSESSMENT/PLAN:  Diagnoses and all orders for this visit:    1.  Controlled type 2 diabetes mellitus without complication, without long-term current use of insulin (HCC)  - Stable on diet alone  - Will continue to monitor  - Continue to work on diet and exercise  - Repeat labs prior to next visit  - Foot exam done today  - Eye exam requested  Orders:    -      DIABETES FOOT EXAM  -     METABOLIC PANEL, COMPREHENSIVE; Future  -     LIPID PANEL; Future  -     HEMOGLOBIN A1C W/O EAG; Future    2. Essential hypertension  - Stable, well controlled  - Meds refilled  Orders:    -     lisinopril-hydroCHLOROthiazide (PRINZIDE, ZESTORETIC) 10-12.5 mg per tablet; Take 1 Tab by mouth daily. 3. Mixed hyperlipidemia  - LDL has improved dramatically since last visit with dietary changes. Patient remains opposed to statin therapy. Will continue to monitor. 4. Mild persistent asthma without complication  - Switch to Symbicort due to formulary change  Orders:    -     budesonide-formoterol (SYMBICORT) 80-4.5 mcg/actuation HFAA; Take 2 Puffs by inhalation two (2) times a day. 5. DDD (degenerative disc disease), lumbar  - Completed another temporary handicap placard form but advised patient that if she does not follow up with her spine doctor, I am not going to be able to keep renewing this for her. I do not see her as permanently disabled and feel we could be doing more to relieve her pain enough that she can exercise regularly to try to lose some weight     6. Screening for breast cancer  - Mammogram ordered  Orders:    -     [facility] MAMMO BI SCREENING INCL CAD; Future    7. Morbid obesity ([address]  - Continue to work on diet and exercise, see above        Follow-up and Dispositions    · Return in about 6 months (around 2/21/2020) for routine care and fasting labs jake done 1 week prior. All questions answered. Patient expresses understanding and agrees with the plan as detailed above.     PATIENT CARE TEAM:   Patient Care Team:  NIMA Arana as PCP - Braden Gao MD (General Surgery)       Cherelle Cole, [id number]   August 21, 2019   5:06 PM

## 2019-08-21 NOTE — PATIENT INSTRUCTIONS
A Healthy Lifestyle: Care Instructions  Your Care Instructions    A healthy lifestyle can help you feel good, stay at a healthy weight, and have plenty of energy for both work and play. A healthy lifestyle is something you can share with your whole family. A healthy lifestyle also can lower your risk for serious health problems, such as high blood pressure, heart disease, and diabetes. You can follow a few steps listed below to improve your health and the health of your family. Follow-up care is a key part of your treatment and safety. Be sure to make and go to all appointments, and call your doctor if you are having problems. It's also a good idea to know your test results and keep a list of the medicines you take. How can you care for yourself at home? · Do not eat too much sugar, fat, or fast foods. You can still have dessert and treats now and then. The goal is moderation. · Start small to improve your eating habits. Pay attention to portion sizes, drink less juice and soda pop, and eat more fruits and vegetables. ? Eat a healthy amount of food. A 3-ounce serving of meat, for example, is about the size of a deck of cards. Fill the rest of your plate with vegetables and whole grains. ? Limit the amount of soda and sports drinks you have every day. Drink more water when you are thirsty. ? Eat at least 5 servings of fruits and vegetables every day. It may seem like a lot, but it is not hard to reach this goal. A serving or helping is 1 piece of fruit, 1 cup of vegetables, or 2 cups of leafy, raw vegetables. Have an apple or some carrot sticks as an afternoon snack instead of a candy bar. Try to have fruits and/or vegetables at every meal.  · Make exercise part of your daily routine. You may want to start with simple activities, such as walking, bicycling, or slow swimming. Try to be active 30 to 60 minutes every day. You do not need to do all 30 to 60 minutes all at once.  For example, you can exercise 3 times a day for 10 or 20 minutes. Moderate exercise is safe for most people, but it is always a good idea to talk to your doctor before starting an exercise program.  · Keep moving. [place] the lawn, work in the garden, or [place]. Take the stairs instead of the elevator at work. · If you smoke, quit. People who smoke have an increased risk for heart attack, stroke, cancer, and other lung illnesses. Quitting is hard, but there are ways to boost your chance of quitting tobacco for good. ? Use nicotine gum, patches, or lozenges. ? Ask your doctor about stop-smoking programs and medicines. ? Keep trying. In addition to reducing your risk of diseases in the future, you will notice some benefits soon after you stop using tobacco. If you have shortness of breath or asthma symptoms, they will likely get better within a few weeks after you quit. · Limit how much alcohol you drink. Moderate amounts of alcohol (up to 2 drinks a day for men, 1 drink a day for women) are okay. But drinking too much can lead to liver problems, high blood pressure, and other health problems. Family health  If you have a family, there are many things you can do together to improve your health. · Eat meals together as a family as often as possible. · Eat healthy foods. This includes fruits, vegetables, lean meats and dairy, and whole grains. · Include your family in your fitness plan. Most people think of activities such as jogging or tennis as the way to fitness, but there are many ways you and your family can be more active. Anything that makes you breathe hard and gets your heart pumping is exercise. Here are some tips:  ? Walk to do errands or to take your child to school or the bus.  ? Go for a family bike ride after dinner instead of watching TV. Where can you learn more? Go to http://yosef-steph.info/. Enter Z485 in the search box to learn more about \"A Healthy Lifestyle: Care Instructions. \"  Current as of: September 11, 2018  Content Version: 12.1  © 2428-1872 [employer]wise, Incorporated. Care instructions adapted under license by [employer] (which disclaims liability or warranty for this information). If you have questions about a medical condition or this instruction, always ask your healthcare professional. [address] any warranty or liability for your use of this information.

## 2019-10-26 ENCOUNTER — HOSPITAL ENCOUNTER (EMERGENCY)
Age: 54
Discharge: HOME | End: 2019-10-26
Attending: EMERGENCY MEDICINE
Payer: COMMERCIAL

## 2019-10-26 ENCOUNTER — APPOINTMENT (OUTPATIENT)
Dept: GENERAL RADIOLOGY | Age: 54
End: 2019-10-26
Attending: PHYSICIAN ASSISTANT
Payer: COMMERCIAL

## 2019-10-26 VITALS
TEMPERATURE: 99.2 F | SYSTOLIC BLOOD PRESSURE: 178 MMHG | HEIGHT: 60 IN | BODY MASS INDEX: 45.16 KG/M2 | RESPIRATION RATE: 17 BRPM | WEIGHT: 230 LBS | DIASTOLIC BLOOD PRESSURE: 87 MMHG | HEART RATE: 98 BPM | OXYGEN SATURATION: 100 %

## 2019-10-26 DIAGNOSIS — S16.1XXA POSTEROLATERAL CERVICAL MUSCLE STRAIN, INITIAL ENCOUNTER: Primary | ICD-10-CM

## 2019-10-26 DIAGNOSIS — S39.012A STRAIN OF LUMBAR PARASPINAL MUSCLE, INITIAL ENCOUNTER: ICD-10-CM
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
## 2019-10-26 PROCEDURE — 72110 X-RAY EXAM L-2 SPINE 4/>VWS: CPT

## 2019-10-26 PROCEDURE — 99284 EMERGENCY DEPT VISIT MOD MDM: CPT

## 2019-10-26 RX ORDER — CYCLOBENZAPRINE HCL 5 MG
5 TABLET ORAL
Qty: 15 TAB | Refills: 0 | Status: SHIPPED | OUTPATIENT
Start: 2019-10-26 | End: 2020-02-23
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
## 2019-10-27 NOTE — DISCHARGE INSTRUCTIONS

## 2020-02-01 DIAGNOSIS — E11.9 CONTROLLED TYPE 2 DIABETES MELLITUS WITHOUT COMPLICATION, WITHOUT LONG-TERM CURRENT USE OF INSULIN (HCC): ICD-10-CM

## 2020-02-17 ENCOUNTER — HOSPITAL ENCOUNTER (OUTPATIENT)
Dept: LAB | Age: 55
Discharge: HOME | End: 2020-02-17

## 2020-02-17 LAB — XX-LABCORP SPECIMEN COL,LCBCF: NORMAL

## 2020-02-17 PROCEDURE — 99001 SPECIMEN HANDLING PT-LAB: CPT

## 2020-02-18 LAB
ALBUMIN SERPL-MCNC: 3.6 G/DL (ref 3.8–4.9)
ALBUMIN/GLOB SERPL: 1.1 {RATIO} (ref 1.2–2.2)
ALP SERPL-CCNC: 102 IU/L (ref 39–117)
ALT SERPL-CCNC: 13 IU/L (ref 0–32)
AST SERPL-CCNC: 16 IU/L (ref 0–40)
BILIRUB SERPL-MCNC: 0.2 MG/DL (ref 0–1.2)
BUN SERPL-MCNC: 7 MG/DL (ref 6–24)
BUN/CREAT SERPL: 9 (ref 9–23)
CALCIUM SERPL-MCNC: 8.8 MG/DL (ref 8.7–10.2)
CHLORIDE SERPL-SCNC: 101 MMOL/L (ref 96–106)
CHOLEST SERPL-MCNC: 191 MG/DL (ref 100–199)
CO2 SERPL-SCNC: 24 MMOL/L (ref 20–29)
CREAT SERPL-MCNC: 0.75 MG/DL (ref 0.57–1)
GLOBULIN SER CALC-MCNC: 3.4 G/DL (ref 1.5–4.5)
GLUCOSE SERPL-MCNC: 123 MG/DL (ref 65–99)
HBA1C MFR BLD: 7.1 % (ref 4.8–5.6)
HDLC SERPL-MCNC: 37 MG/DL
INTERPRETATION, 910389: NORMAL
LDLC SERPL CALC-MCNC: 133 MG/DL (ref 0–99)
Lab: NORMAL
POTASSIUM SERPL-SCNC: 4.3 MMOL/L (ref 3.5–5.2)
PROT SERPL-MCNC: 7 G/DL (ref 6–8.5)
SODIUM SERPL-SCNC: 139 MMOL/L (ref 134–144)
TRIGL SERPL-MCNC: 104 MG/DL (ref 0–149)
VLDLC SERPL CALC-MCNC: 21 MG/DL (ref 5–40)

## 2020-02-23 ENCOUNTER — HOSPITAL ENCOUNTER (EMERGENCY)
Age: 55
Discharge: HOME | End: 2020-02-23
Attending: EMERGENCY MEDICINE
Payer: COMMERCIAL

## 2020-02-23 ENCOUNTER — APPOINTMENT (OUTPATIENT)
Dept: GENERAL RADIOLOGY | Age: 55
End: 2020-02-23
Attending: PHYSICIAN ASSISTANT
Payer: COMMERCIAL

## 2020-02-23 VITALS
TEMPERATURE: 99 F | SYSTOLIC BLOOD PRESSURE: 167 MMHG | WEIGHT: 230 LBS | OXYGEN SATURATION: 98 % | DIASTOLIC BLOOD PRESSURE: 87 MMHG | BODY MASS INDEX: 45.16 KG/M2 | HEART RATE: 97 BPM | HEIGHT: 60 IN | RESPIRATION RATE: 16 BRPM

## 2020-02-23 DIAGNOSIS — S90.111A CONTUSION OF RIGHT GREAT TOE WITHOUT DAMAGE TO NAIL, INITIAL ENCOUNTER: Primary | ICD-10-CM

## 2020-02-23 DIAGNOSIS — S00.81XA ABRASION OF FOREHEAD, INITIAL ENCOUNTER: ICD-10-CM

## 2020-02-23 DIAGNOSIS — R03.0 ELEVATED BLOOD PRESSURE READING: ICD-10-CM

## 2020-02-23 PROCEDURE — 99283 EMERGENCY DEPT VISIT LOW MDM: CPT

## 2020-02-23 PROCEDURE — 77030036687 HC SHOE PSTOP S2SG -A

## 2020-02-23 PROCEDURE — 73660 X-RAY EXAM OF TOE(S): CPT

## 2020-02-23 NOTE — ED NOTES
6:26 PM    Meg Bruner is a 47 y.o. female presenting to the ED C/O right great toe pain s/p trip and fall 1 week ago. Also c/o occipital and frontal HA since the fall, +head injury. No LOC or severe HA      I performed a brief history of the patient here in triage and I have determined that pt will need further treatment and evaluation from the main side ER physician. I have placed initial orders to help in expediting patients care.          Visit Vitals  /87 (BP 1 Location: Left arm, BP Patient Position: Sitting)   Pulse 97   Temp 99 °F (37.2 °C)   Resp 16   Ht 5' (1.524 m)   Wt 104.3 kg (230 lb)   SpO2 98%   BMI 44.92 kg/m²          Written by NIMA Ayers, ED

## 2020-02-24 NOTE — DISCHARGE INSTRUCTIONS
1.  Preliminary reading of toe x-ray negative for acute fracture. Tiny avulsion (appears old) dorsal surface at the interphalangeal joint  2. Apply ice and elevate the toe. 3.  Use postop shoe for comfort. 4.  Okay to take Motrin or Aleve for symptomatic relief. 5.  Local wound care to forehead abrasion. 6.  Return to the emergency department for severe headache, recurrent vomiting, severe dizziness, or other signs of head injury. Is unlikely that these should occur given mechanism of injury and passage of time since the fall. 7.  Follow-up with your primary care physician for recheck.   Blood pressure elevated to 167/87 today (normal less than 130/80)

## 2020-02-24 NOTE — ED PROVIDER NOTES
54-year-old female slipped and fell on 2/14/2020. She fell forward sustaining abrasions to forehead. Those are largely resolved. She continues to have ongoing pain, however, at the base of the right great toe. Wearing standard shoes at the time. She is not exactly sure how she injured the toe during the fall. Has pain now with weightbearing at the MTP P joint. No ankle or knee injuries. No ongoing headache. Past Medical History:   Diagnosis Date    Allergic rhinitis 11/9/2009    Asthma 2015    PFT confirmed    CAD (coronary artery disease)     hx mi-2013 ?  Carpal tunnel syndrome 11/9/2009    Chronic low back pain without sciatica 5/17/2016    Fibroid, uterine 11/9/2009    GERD (gastroesophageal reflux disease) 11/9/2009    Heart attack ([address]     MI approx 2013    HLD (hyperlipidemia) 11/9/2009    Hypertension     Hypertriglyceridemia 5/17/2016    Hypothyroidism 5/17/2016       Past Surgical History:   Procedure Laterality Date    COLONOSCOPY N/A 6/1/2017    COLONOSCOPY with Polypectomy performed by Kari Almendarez MD at [address] HX BREAST BIOPSY      benign, left    HX GYN      fibroids removed    HX MYOMECTOMY      open first, then embolization 2nd time.     HX OTHER SURGICAL      cyst removed    HX TONSILLECTOMY      ND COLONOSCOPY W/BIOPSY SINGLE/MULTIPLE N/A 06/01/2017    Dr. Harpreet Lara         Family History:   Problem Relation Age of Onset    Hypertension Mother     Stroke Father         mini strokes, not sure of number    Hypertension Father     Seizures Father     Cancer Maternal Grandfather         not sure type       Social History     Socioeconomic History    Marital status:      Spouse name: Not on file    Number of children: Not on file    Years of education: Not on file    Highest education level: Not on file   Occupational History    Not on file   Social Needs    Financial resource strain: Not on file    Food insecurity:     Worry: Not on file     Inability: Not on file    Transportation needs:     Medical: Not on file     Non-medical: Not on file   Tobacco Use    Smoking status: Never Smoker    Smokeless tobacco: Never Used   Substance and Sexual Activity    Alcohol use: No     Alcohol/week: 0.0 standard drinks    Drug use: No    Sexual activity: Yes     Partners: Male     Birth control/protection: None   Lifestyle    Physical activity:     Days per week: Not on file     Minutes per session: Not on file    Stress: Not on file   Relationships    Social connections:     Talks on phone: Not on file     Gets together: Not on file     Attends Advent service: Not on file     Active member of club or organization: Not on file     Attends meetings of clubs or organizations: Not on file     Relationship status: Not on file    Intimate partner violence:     Fear of current or ex partner: Not on file     Emotionally abused: Not on file     Physically abused: Not on file     Forced sexual activity: Not on file   Other Topics Concern     Service No    Blood Transfusions No    Caffeine Concern No    Occupational Exposure No    Hobby Hazards No    Sleep Concern No    Stress Concern No    Weight Concern Yes    Special Diet No    Back Care Yes    Exercise No    Bike Helmet No    Seat Belt Yes     Comment: some times    Self-Exams Yes   Social History Narrative    Not on file         ALLERGIES: Motrin [ibuprofen]    Review of Systems   Musculoskeletal: Negative for back pain and neck pain. Neurological: Negative for syncope and headaches. All other systems reviewed and are negative. Vitals:    02/23/20 1821   BP: 167/87   Pulse: 97   Resp: 16   Temp: 99 °F (37.2 °C)   SpO2: 98%   Weight: 104.3 kg (230 lb)   Height: 5' (1.524 m)            Physical Exam  Vitals signs and nursing note reviewed. Constitutional:       General: She is not in acute distress. Appearance: She is well-developed.    HENT:      Head: Normocephalic. Comments: Healing abrasion above the right eyebrow with no soft tissue swelling or deformity. No hematoma. No nasal deformity. Eyes:      General: No scleral icterus. Neck:      Musculoskeletal: No muscular tenderness. Cardiovascular:      Rate and Rhythm: Normal rate. Pulmonary:      Effort: Pulmonary effort is normal.   Musculoskeletal:      Comments: Tenderness at the base of the right toe medially and on the ventral surface. No ecchymosis or deformity. Nail plates intact and not involved. No tenderness proximally of the ankle. Skin:     General: Skin is warm and dry. Neurological:      Mental Status: She is alert and oriented to person, place, and time. X-ray of the great toe interpreted per myself as demonstrating degenerative changes with a small avulsion that appears old on the dorsal surface of the proximal aspect distal phalanx at the IP joint. No acute fracture identified. MDM  Number of Diagnoses or Management Options  Abrasion of forehead, initial encounter:   Contusion of right great toe without damage to nail, initial encounter:   Elevated blood pressure reading:   Diagnosis management comments: Impression: Toe contusion secondary to fall. Patient has residual abrasion to the forehead as well without clinical findings of intracranial head injury. Symptomatic treatment. Procedures    Diagnosis:   1. Contusion of right great toe without damage to nail, initial encounter    2. Abrasion of forehead, initial encounter    3. Elevated blood pressure reading      1. Preliminary reading of toe x-ray negative for acute fracture. Tiny avulsion (appears old) dorsal surface at the interphalangeal joint  2. Apply ice and elevate the toe. 3.  Use postop shoe for comfort. 4.  Okay to take Motrin or Aleve for symptomatic relief. 5.  Local wound care to forehead abrasion.   6.  Return to the emergency department for severe headache, recurrent vomiting, severe dizziness, or other signs of head injury. Is unlikely that these should occur given mechanism of injury and passage of time since the fall. 7.  Follow-up with your primary care physician for recheck. Blood pressure elevated to 167/87 today (normal less than 130/80)    Disposition: home    Follow-up Information     Follow up With Specialties Details Why Contact Info    Bennie Ken PA Physician Assistant In 1 week for repeat blood pressure check, for recheck of ongoing symptoms [address]  [phone]            Patient's Medications   Start Taking    No medications on file   Continue Taking    ASPIRIN 81 MG CHEWABLE TABLET    Take 1 Tab by mouth daily. BUDESONIDE-FORMOTEROL (SYMBICORT) 80-4.5 MCG/ACTUATION HFAA    Take 2 Puffs by inhalation two (2) times a day. ERGOCALCIFEROL (DRISDOL) 50,000 UNIT CAPSULE    Take 1 Cap by mouth every seven (7) days. KETOCONAZOLE (NIZORAL) 2 % TOPICAL CREAM        LISINOPRIL-HYDROCHLOROTHIAZIDE (PRINZIDE, ZESTORETIC) 10-12.5 MG PER TABLET    Take 1 Tab by mouth daily. MOMETASONE (NASONEX) 50 MCG/ACTUATION NASAL SPRAY    2 Sprays by Both Nostrils route daily. OMEGA-3 FATTY ACIDS (FISH OIL) 300 MG CAP    Take  by mouth. These Medications have changed    No medications on file   Stop Taking    ALBUTEROL (PROVENTIL HFA, VENTOLIN HFA, PROAIR HFA) 90 MCG/ACTUATION INHALER    Take 2 Puffs by inhalation every four (4) hours as needed for Wheezing. CYCLOBENZAPRINE (FLEXERIL) 5 MG TABLET    Take 1 Tab by mouth three (3) times daily as needed for Muscle Spasm(s).

## 2020-03-03 ENCOUNTER — OFFICE VISIT (OUTPATIENT)
Dept: FAMILY MEDICINE CLINIC | Age: 55
End: 2020-03-03

## 2020-03-03 VITALS
TEMPERATURE: 98.2 F | HEIGHT: 60 IN | SYSTOLIC BLOOD PRESSURE: 144 MMHG | RESPIRATION RATE: 16 BRPM | DIASTOLIC BLOOD PRESSURE: 78 MMHG | OXYGEN SATURATION: 98 % | HEART RATE: 73 BPM | WEIGHT: 230 LBS | BODY MASS INDEX: 45.16 KG/M2

## 2020-03-03 DIAGNOSIS — E55.9 VITAMIN D DEFICIENCY: ICD-10-CM

## 2020-03-03 DIAGNOSIS — E11.9 CONTROLLED TYPE 2 DIABETES MELLITUS WITHOUT COMPLICATION, WITHOUT LONG-TERM CURRENT USE OF INSULIN (HCC): Primary | ICD-10-CM

## 2020-03-03 DIAGNOSIS — E78.2 MIXED HYPERLIPIDEMIA: ICD-10-CM

## 2020-03-03 DIAGNOSIS — I10 ESSENTIAL HYPERTENSION: ICD-10-CM

## 2020-03-03 DIAGNOSIS — J45.30 MILD PERSISTENT ASTHMA WITHOUT COMPLICATION: ICD-10-CM

## 2020-03-03 RX ORDER — BUDESONIDE AND FORMOTEROL FUMARATE DIHYDRATE 80; 4.5 UG/1; UG/1
2 AEROSOL RESPIRATORY (INHALATION) 2 TIMES DAILY
Qty: 1 INHALER | Refills: 5 | Status: SHIPPED | OUTPATIENT
Start: 2020-03-03 | End: 2021-08-17 | Stop reason: SDUPTHER

## 2020-03-03 RX ORDER — METFORMIN HYDROCHLORIDE 500 MG/1
500 TABLET, EXTENDED RELEASE ORAL
Qty: 30 TAB | Refills: 2 | Status: SHIPPED | OUTPATIENT
Start: 2020-03-03 | End: 2021-08-20 | Stop reason: SDUPTHER

## 2020-03-03 RX ORDER — LISINOPRIL AND HYDROCHLOROTHIAZIDE 10; 12.5 MG/1; MG/1
1 TABLET ORAL DAILY
Qty: 30 TAB | Refills: 5 | Status: SHIPPED | OUTPATIENT
Start: 2020-03-03 | End: 2021-08-17 | Stop reason: SDUPTHER

## 2020-03-03 NOTE — PROGRESS NOTES
1. Have you been to the ER, urgent care clinic since your last visit? Hospitalized since your last visit? No    2. Have you seen or consulted any other health care providers outside of the [address] [place] [address] since your last visit? Include any pap smears or colon screening.  No

## 2020-03-03 NOTE — PROGRESS NOTES
Patient: Gayla Brittle MRN: [id number]  SSN: [id number]    YOB: 1965  Age: 47 y.o. Sex: female      Date of Service: 3/3/2020   Provider: NIMA Julien   Office Location:   [facility], [address] [place], [address]  Office Phone: [phone]  Office Fax: [phone]      REASON FOR VISIT:   Chief Complaint   Patient presents with    Asthma    Cholesterol Problem    Diabetes    Hypertension        VITALS:   Visit Vitals  /78 (BP 1 Location: Left arm, BP Patient Position: Sitting)   Pulse 73   Temp 98.2 °F (36.8 °C) (Oral)   Resp 16   Ht 5' (1.524 m)   Wt 230 lb (104.3 kg)   LMP 05/11/2019   SpO2 98%   BMI 44.92 kg/m²        MEDICATIONS:   Current Outpatient Medications on File Prior to Visit   Medication Sig Dispense Refill    ergocalciferol (DRISDOL) 50,000 unit capsule Take 1 Cap by mouth every seven (7) days. 12 Cap 0    aspirin 81 mg chewable tablet Take 1 Tab by mouth daily. 30 Tab 0    Omega-3 Fatty Acids (FISH OIL) 300 mg cap Take  by mouth.  [DISCONTINUED] lisinopril-hydroCHLOROthiazide (PRINZIDE, ZESTORETIC) 10-12.5 mg per tablet Take 1 Tab by mouth daily. 30 Tab 5    [DISCONTINUED] budesonide-formoterol (SYMBICORT) 80-4.5 mcg/actuation HFAA Take 2 Puffs by inhalation two (2) times a day. 1 Inhaler 5    ketoconazole (NIZORAL) 2 % topical cream   3    mometasone (NASONEX) 50 mcg/actuation nasal spray 2 Sprays by Both Nostrils route daily. 1 Container 0     No current facility-administered medications on file prior to visit. ALLERGIES:   Allergies   Allergen Reactions    Motrin [Ibuprofen] Other (comments)     Directed not to take by physician. MEDICAL/SURGICAL HISTORY:  Past Medical History:   Diagnosis Date    Allergic rhinitis 11/9/2009    Asthma 2015    PFT confirmed    CAD (coronary artery disease)     hx mi-2013 ?     Carpal tunnel syndrome 11/9/2009    Chronic low back pain without sciatica 5/17/2016    Fibroid, uterine 11/9/2009    GERD (gastroesophageal reflux disease) 11/9/2009    Heart [address] [address]     MI approx 2013    HLD (hyperlipidemia) 11/9/2009    Hypertension     Hypertriglyceridemia 5/17/2016    Hypothyroidism 5/17/2016      Past Surgical History:   Procedure Laterality Date    COLONOSCOPY N/A 6/1/2017    COLONOSCOPY with Polypectomy performed by Carolina Rosas MD at [address] HX BREAST BIOPSY      benign, left    HX GYN      fibroids removed    HX MYOMECTOMY      open first, then embolization 2nd time.  HX OTHER SURGICAL      cyst removed    HX TONSILLECTOMY      HI COLONOSCOPY W/BIOPSY SINGLE/MULTIPLE N/A 06/01/2017    Dr. Ortiz Solid:  Family History   Problem Relation Age of Onset    Hypertension Mother     Stroke Father         mini strokes, not sure of number    Hypertension Father     Seizures Father     Cancer Maternal Grandfather         not sure type        SOCIAL HISTORY:  Social History     Tobacco Use    Smoking status: Never Smoker    Smokeless tobacco: Never Used   Substance Use Topics    Alcohol use: No     Alcohol/week: 0.0 standard drinks    Drug use: No             HISTORY OF PRESENT ILLNESS: Martina Herrera is a 47 y.o. female who presents to the office for a routine follow up visit. Diabetes -  Stable, but not at goal. A1c has increased to 7.1% (previously 6.4% in August 2019)   She is not currently on any medications as she had preferred to work on lifestyle modification first.      Last urine microalbumin: 5/18/19, negative screening   Last lipid panel: 2/17/20, LMV 133 PM that time.  Patient is NOT on statin therapy at her request (see below)   Last foot exam: 8/21/19  Last eye exam:      Hypertension -   BP stable   Reports compliance with the following regimen: lisinopril-HCTZ 10-12.5 mg daily      Hyperlipidemia -   In the setting of possible CAD -- patient reports she had MI in 2013, but to date we have no record of this. Patient has been resistant to statin therapy despite her CV risk, and I had told her that if she could maintain an LDL under 100 (ideally under 70) with diet and exercise, then she did not need to take the statin. Unfortunately her recent lipid panel shows an increase of LDL to 133.      Asthma -   Mild-moderate persistent  Well controlled on Symbicort with albuterol PRN   Denies fevers, chest pain, SOB.       REVIEW OF SYSTEMS:  Review of Systems   Constitutional: Negative for chills, fever and malaise/fatigue. Respiratory: Negative for cough, shortness of breath and wheezing. Cardiovascular: Negative for chest pain, palpitations and leg swelling. Neurological: Negative for dizziness and headaches. PHYSICAL EXAMINATION:  Physical Exam  Cardiovascular:      Rate and Rhythm: Normal rate and regular rhythm. Heart sounds: Normal heart sounds. No murmur. No friction rub. No gallop. Pulmonary:      Effort: Pulmonary effort is normal.      Breath sounds: Wheezing ( faint expiratory wheezes b/l) present. No rales. Skin:     General: Skin is warm and dry. Findings: No rash. Neurological:      Mental Status: She is alert and oriented to person, place, and time. Gait: Gait is intact.    Psychiatric:         Mood and Affect: Mood and affect normal.         Cognition and Memory: Memory normal.          RESULTS:  Lab Results   Component Value Date/Time    Sodium 139 02/17/2020 12:00 AM    Potassium 4.3 02/17/2020 12:00 AM    Chloride 101 02/17/2020 12:00 AM    CO2 24 02/17/2020 12:00 AM    Anion gap 9 09/10/2017 11:38 AM    Glucose 123 (H) 02/17/2020 12:00 AM    BUN 7 02/17/2020 12:00 AM    Creatinine 0.75 02/17/2020 12:00 AM    BUN/Creatinine ratio 9 02/17/2020 12:00 AM    GFR est  02/17/2020 12:00 AM    GFR est non-AA 91 02/17/2020 12:00 AM    Calcium 8.8 02/17/2020 12:00 AM    Bilirubin, total 0.2 02/17/2020 12:00 AM    AST (SGOT) 16 02/17/2020 12:00 AM Alk. phosphatase 102 02/17/2020 12:00 AM    Protein, total 7.0 02/17/2020 12:00 AM    Albumin 3.6 (L) 02/17/2020 12:00 AM    Globulin 5.4 (H) 09/10/2017 11:38 AM    A-G Ratio 1.1 (L) 02/17/2020 12:00 AM    ALT (SGPT) 13 02/17/2020 12:00 AM      Lab Results   Component Value Date/Time    Cholesterol, total 191 02/17/2020 12:00 AM    HDL Cholesterol 37 (L) 02/17/2020 12:00 AM    LDL, calculated 133 (H) 02/17/2020 12:00 AM    VLDL, calculated 21 02/17/2020 12:00 AM    Triglyceride 104 02/17/2020 12:00 AM    CHOL/HDL Ratio 5.4 (H) 01/13/2017 10:09 AM      Lab Results   Component Value Date/Time    Hemoglobin A1c 7.1 (H) 02/17/2020 12:00 AM        ASSESSMENT/PLAN:  Diagnoses and all orders for this visit:    1. Controlled type 2 diabetes mellitus without complication, without long-term current use of insulin (HCC)  - A1c continues to trend upward, despite patient's efforts with diet. Discussed that it is time to start medication.   - Will start with low dose metformin as below. Counseled on risks/benefits/potential adverse side effects  - Re-check A1c prior to next visit  Orders:    -     metFORMIN ER (GLUCOPHAGE XR) 500 mg tablet; Take 1 Tab by mouth daily (with dinner). -     HEMOGLOBIN A1C W/O EAG    2. Essential hypertension  - Stable, meds refilled  Orders:    -     lisinopril-hydroCHLOROthiazide (PRINZIDE, ZESTORETIC) 10-12.5 mg per tablet; Take 1 Tab by mouth daily. 3. Mixed hyperlipidemia  - Patient continues to refuse statin     4. Mild persistent asthma without complication  - noted to be wheezing a bit today, but she is asymptomatic  - Continue Symbicort, compliance encouraged  Orders:    -     budesonide-formoteroL (SYMBICORT) 80-4.5 mcg/actuation HFAA; Take 2 Puffs by inhalation two (2) times a day.     5. Vitamin D deficiency  Orders:    -     VITAMIN D, 25 HYDROXY      Follow-up and Dispositions    · Return in about 2 months (around 5/3/2020) for WWE/Pap, non-fasting labs due one week prior to visit. All questions answered. Patient expresses understanding and agrees with the plan as detailed above.     PATIENT CARE TEAM:   Patient Care Team:  NIMA Sauceda as PCP - General  NIMA Sauceda as PCP - [facility] Empaneled Provider  Philipp Bullock MD (General Surgery)       NIMA Tran   March 3, 2020   1:08 PM

## 2021-08-17 ENCOUNTER — HOSPITAL ENCOUNTER (OUTPATIENT)
Dept: LAB | Age: 56
Discharge: HOME | End: 2021-08-17

## 2021-08-17 ENCOUNTER — OFFICE VISIT (OUTPATIENT)
Dept: FAMILY MEDICINE CLINIC | Age: 56
End: 2021-08-17
Payer: COMMERCIAL

## 2021-08-17 VITALS
RESPIRATION RATE: 18 BRPM | TEMPERATURE: 98 F | WEIGHT: 242.25 LBS | OXYGEN SATURATION: 98 % | BODY MASS INDEX: 47.56 KG/M2 | DIASTOLIC BLOOD PRESSURE: 82 MMHG | SYSTOLIC BLOOD PRESSURE: 120 MMHG | HEART RATE: 75 BPM | HEIGHT: 60 IN

## 2021-08-17 DIAGNOSIS — E66.01 MORBID OBESITY WITH BMI OF 45.0-49.9, ADULT (HCC): ICD-10-CM

## 2021-08-17 DIAGNOSIS — R30.0 DYSURIA: ICD-10-CM

## 2021-08-17 DIAGNOSIS — L73.2 HIDRADENITIS SUPPURATIVA: Primary | ICD-10-CM

## 2021-08-17 DIAGNOSIS — I10 ESSENTIAL HYPERTENSION: ICD-10-CM

## 2021-08-17 DIAGNOSIS — E11.9 CONTROLLED TYPE 2 DIABETES MELLITUS WITHOUT COMPLICATION, WITHOUT LONG-TERM CURRENT USE OF INSULIN (HCC): ICD-10-CM

## 2021-08-17 DIAGNOSIS — J45.30 MILD PERSISTENT ASTHMA WITHOUT COMPLICATION: ICD-10-CM

## 2021-08-17 LAB — SENTARA SPECIMEN COL,SENBCF: NORMAL

## 2021-08-17 PROCEDURE — 99001 SPECIMEN HANDLING PT-LAB: CPT

## 2021-08-17 PROCEDURE — 99214 OFFICE O/P EST MOD 30 MIN: CPT | Performed by: STUDENT IN AN ORGANIZED HEALTH CARE EDUCATION/TRAINING PROGRAM

## 2021-08-17 RX ORDER — LISINOPRIL AND HYDROCHLOROTHIAZIDE 10; 12.5 MG/1; MG/1
1 TABLET ORAL DAILY
Qty: 30 TABLET | Refills: 5 | Status: SHIPPED | OUTPATIENT
Start: 2021-08-17 | End: 2022-10-21 | Stop reason: SDUPTHER

## 2021-08-17 RX ORDER — CLINDAMYCIN PHOSPHATE 10 MG/G
GEL TOPICAL 2 TIMES DAILY
Qty: 1 ML | Refills: 0 | Status: SHIPPED | OUTPATIENT
Start: 2021-08-17

## 2021-08-17 RX ORDER — BUDESONIDE AND FORMOTEROL FUMARATE DIHYDRATE 80; 4.5 UG/1; UG/1
2 AEROSOL RESPIRATORY (INHALATION) 2 TIMES DAILY
Qty: 1 INHALER | Refills: 5 | Status: SHIPPED | OUTPATIENT
Start: 2021-08-17 | End: 2022-10-21 | Stop reason: SDUPTHER

## 2021-08-17 NOTE — PROGRESS NOTES
Chief Complaint   Patient presents with   [address]     former patient of Balaji HERRING     Other     c/ o bump / possible cyst at the base of the left armpit        1. Have you been to the ER, urgent care clinic since your last visit? Hospitalized since your last visit? No    2. Have you seen or consulted any other health care providers outside of the [address] [place] [address] since your last visit? Include any pap smears or colon screening.  No     No immunization record noted on [employer]

## 2021-08-17 NOTE — PATIENT INSTRUCTIONS
Hidradenitis Suppurativa: Care Instructions  Your Care Instructions     Hidradenitis suppurativa (say \"dte-wmum-vd-NY-tus sup-herman-uh-TY-vuh\") is a skin condition that causes lumps on the skin that look like pimples or boils. The lumps are usually painful and can break open and drain blood and bad-smelling pus. The condition can come and go for many years. Treatment for this condition may include antibiotics and other medicines. You may need surgery to remove the lumps. Home care includes wearing loose-fitting clothes and washing the area gently. You can help prevent lumps from coming back by staying at a healthy weight and not smoking. Doctors don't know exactly how this condition starts. But they do know that something irritates and inflames the hair follicles, causing them to swell and form lumps. This skin condition can't be spread from person to person (isn't contagious). Follow-up care is a key part of your treatment and safety. Be sure to make and go to all appointments, and call your doctor if you are having problems. It's also a good idea to know your test results and keep a list of the medicines you take. How can you care for yourself at home? Skin care    · Wash the area every day with mild soap. Use your hands rather than a washcloth or sponge when you wash that part of your body.     · Leave the affected areas uncovered when you can. If you have lumps that are draining, you can cover them with a bandage or other dressing. Put petroleum jelly (such as Vaseline) on the dressing to help keep it from sticking.     · Wear-loose fitting clothes that don't rub against the area. Avoid activities that cause skin to rub together.     · If you have pain, try a warm compress. Soak a towel or washcloth in warm water, wring it out, and place it on the affected skin for about 10 minutes. Medicines    · Be safe with medicines. Take your medicines exactly as prescribed.  Call your doctor if you think you are having a problem with your medicine. You will get more details on the specific medicines your doctor prescribes.     · If your doctor prescribed antibiotics, take them as directed. Do not stop taking them just because you feel better. You need to take the full course of antibiotics. Lifestyle choices    · If you smoke, think about quitting. Smoking can make the condition worse. If you need help quitting, talk to your doctor about stop-smoking programs and medicines. These can increase your chances of quitting for good.     · Stay at a healthy weight, or lose weight, by eating healthy foods and being physically active. Being overweight could make this condition worse. When should you call for help? Call your doctor now or seek immediate medical care if:    · You have symptoms of infection, such as:  ? Increased pain, swelling, warmth, or redness. ? Red streaks leading from the area. ? Pus draining from the area. ? A fever. Watch closely for changes in your health, and be sure to contact your doctor if:    · You do not get better as expected. Where can you learn more? Go to http://www.gray.com/  Enter P277 in the search box to learn more about \"Hidradenitis Suppurativa: Care Instructions. \"  Current as of: July 2, 2020               Content Version: 12.8  © 2006-2021 Healthwise[employer]. Care instructions adapted under license by [employer] (which disclaims liability or warranty for this information). If you have questions about a medical condition or this instruction, always ask your healthcare professional. [place] [address] any warranty or liability for your use of this information. DASH Diet: Care Instructions  Your Care Instructions     The DASH diet is an eating plan that can help lower your blood pressure. DASH stands for Dietary Approaches to Stop Hypertension. Hypertension is high blood pressure.   The DASH diet focuses on eating foods that are high in calcium, potassium, and magnesium. These nutrients can lower blood pressure. The foods that are highest in these nutrients are fruits, vegetables, low-fat dairy products, nuts, seeds, and legumes. But taking calcium, potassium, and magnesium supplements instead of eating foods that are high in those nutrients does not have the same effect. The DASH diet also includes whole grains, fish, and poultry. The DASH diet is one of several lifestyle changes your doctor may recommend to lower your high blood pressure. Your doctor may also want you to decrease the amount of sodium in your diet. Lowering sodium while following the DASH diet can lower blood pressure even further than just the DASH diet alone. Follow-up care is a key part of your treatment and safety. Be sure to make and go to all appointments, and call your doctor if you are having problems. It's also a good idea to know your test results and keep a list of the medicines you take. How can you care for yourself at home? Following the DASH diet  · Eat 4 to 5 servings of fruit each day. A serving is 1 medium-sized piece of fruit, ½ cup chopped or canned fruit, 1/4 cup dried fruit, or 4 ounces (½ cup) of fruit juice. Choose fruit more often than fruit juice. · Eat 4 to 5 servings of vegetables each day. A serving is 1 cup of lettuce or raw leafy vegetables, ½ cup of chopped or cooked vegetables, or 4 ounces (½ cup) of vegetable juice. Choose vegetables more often than vegetable juice. · Get 2 to 3 servings of low-fat and fat-free dairy each day. A serving is 8 ounces of milk, 1 cup of yogurt, or 1 ½ ounces of cheese. · Eat 6 to 8 servings of grains each day. A serving is 1 slice of bread, 1 ounce of dry cereal, or ½ cup of cooked rice, pasta, or cooked cereal. Try to choose whole-grain products as much as possible. · Limit lean meat, poultry, and fish to 2 servings each day.  A serving is 3 ounces, about the size of a deck of cards.  · Eat 4 to 5 servings of nuts, seeds, and legumes (cooked dried beans, lentils, and split peas) each week. A serving is 1/3 cup of nuts, 2 tablespoons of seeds, or ½ cup of cooked beans or peas. · Limit fats and oils to 2 to 3 servings each day. A serving is 1 teaspoon of vegetable oil or 2 tablespoons of salad dressing. · Limit sweets and added sugars to 5 servings or less a week. A serving is 1 tablespoon jelly or jam, ½ cup sorbet, or 1 cup of lemonade. · Eat less than 2,300 milligrams (mg) of sodium a day. If you limit your sodium to 1,500 mg a day, you can lower your blood pressure even more. · Be aware that all of these are the suggested number of servings for people who eat 1,800 to 2,000 calories a day. Your recommended number of servings may be different if you need more or fewer calories. Tips for success  · Start small. Do not try to make dramatic changes to your diet all at once. You might feel that you are missing out on your favorite foods and then be more likely to not follow the plan. Make small changes, and stick with them. Once those changes become habit, add a few more changes. · Try some of the following:  ? Make it a goal to eat a fruit or vegetable at every meal and at snacks. This will make it easy to get the recommended amount of fruits and vegetables each day. ? Try yogurt topped with fruit and nuts for a snack or healthy dessert. ? Add lettuce, tomato, cucumber, and onion to sandwiches. ? Combine a ready-made pizza crust with low-fat mozzarella cheese and lots of vegetable toppings. Try using tomatoes, squash, spinach, broccoli, carrots, cauliflower, and onions. ? Have a variety of cut-up vegetables with a low-fat dip as an appetizer instead of chips and dip. ? Sprinkle sunflower seeds or chopped almonds over salads. Or try adding chopped walnuts or almonds to cooked vegetables. ? Try some vegetarian meals using beans and peas. Add garbanzo or kidney beans to salads. Make burritos and tacos with mashed katz beans or black beans. Where can you learn more? Go to http://www.Ticketfly.com/  Enter H967 in the search box to learn more about \"DASH Diet: Care Instructions. \"  Current as of: August 31, 2020               Content Version: 12.8  © 2006-2021 [employer]. Care instructions adapted under license by [employer] (which disclaims liability or warranty for this information). If you have questions about a medical condition or this instruction, always ask your healthcare professional. [address] any warranty or liability for your use of this information. Learning About Meal Planning for Diabetes  Why plan your meals? Meal planning can be a key part of managing diabetes. Planning meals and snacks with the right balance of carbohydrate, protein, and fat can help you keep your blood sugar at the target level you set with your doctor. You don't have to eat special foods. You can eat what your family eats, including sweets once in a while. But you do have to pay attention to how often you eat and how much you eat of certain foods. You may want to work with a dietitian or a certified diabetes educator. He or she can give you tips and meal ideas and can answer your questions about meal planning. This health professional can also help you reach a healthy weight if that is one of your goals. What plan is right for you? Your dietitian or diabetes educator may suggest that you start with the plate format or carbohydrate counting. The plate format  The plate format is a simple way to help you manage how you eat. You plan meals by learning how much space each food should take on a plate. Using the plate format helps you spread carbohydrate throughout the day. It can make it easier to keep your blood sugar level within your target range. It also helps you see if you're eating healthy portion sizes.   To use the plate format, you put non-starchy vegetables on half your plate. Add meat or meat substitutes on one-quarter of the plate. Put a grain or starchy vegetable (such as brown rice or a potato) on the final quarter of the plate. You can add a small piece of fruit and some low-fat or fat-free milk or yogurt, depending on your carbohydrate goal for each meal.  Here are some tips for using the plate format:  · Make sure that you are not using an oversized plate. A 9-inch plate is best. Many restaurants use larger plates. · Get used to using the plate format at home. Then you can use it when you eat out. · Write down your questions about using the plate format. Talk to your doctor, a dietitian, or a diabetes educator about your concerns. Carbohydrate counting  With carbohydrate counting, you plan meals based on the amount of carbohydrate in each food. Carbohydrate raises blood sugar higher and more quickly than any other nutrient. It is found in desserts, breads and cereals, and fruit. It's also found in starchy vegetables such as potatoes and corn, grains such as rice and pasta, and milk and yogurt. Spreading carbohydrate throughout the day helps keep your blood sugar levels within your target range. Your daily amount depends on several things, including your weight, how active you are, which diabetes medicines you take, and what your goals are for your blood sugar levels. A registered dietitian or diabetes educator can help you plan how much carbohydrate to include in each meal and snack. A guideline for your daily amount of carbohydrate is:  · 45 to 60 grams at each meal. That's about the same as 3 to 4 carbohydrate servings. · 15 to 20 grams at each snack. That's about the same as 1 carbohydrate serving. The Nutrition Facts label on packaged foods tells you how much carbohydrate is in a serving of the food. First, look at the serving size on the food label. Is that the amount you eat in a serving?  All of the nutrition information on a food label is based on that serving size. So if you eat more or less than that, you'll need to adjust the other numbers. Total carbohydrate is the next thing you need to look for on the label. If you count carbohydrate servings, one serving of carbohydrate is 15 grams. For foods that don't come with labels, such as fresh fruits and vegetables, you'll need a guide that lists carbohydrate in these foods. Ask your doctor, dietitian, or diabetes educator about books or other nutrition guides you can use. If you take insulin, you need to know how many grams of carbohydrate are in a meal. This lets you know how much rapid-acting insulin to take before you eat. If you use an insulin pump, you get a constant rate of insulin during the day. So the pump must be programmed at meals to give you extra insulin to cover the rise in blood sugar after meals. When you know how much carbohydrate you will eat, you can take the right amount of insulin. Or, if you always use the same amount of insulin, you need to make sure that you eat the same amount of carbohydrate at meals. If you need more help to understand carbohydrate counting and food labels, ask your doctor, dietitian, or diabetes educator. How can you plan healthy meals? Here are some tips to get started:  · Plan your meals a week at a time. Don't forget to include snacks too. · Use cookbooks or online recipes to plan several main meals. Plan some quick meals for busy nights. You also can double some recipes that freeze well. Then you can save half for other busy nights when you don't have time to cook. · Make sure you have the ingredients you need for your recipes. If you're running low on basic items, put these items on your shopping list too. · List foods that you use to make breakfasts, lunches, and snacks. List plenty of fruits and vegetables. · Post this list on the refrigerator. Add to it as you think of more things you need.   · Take the list to the store to do your weekly shopping. Follow-up care is a key part of your treatment and safety. Be sure to make and go to all appointments, and call your doctor if you are having problems. It's also a good idea to know your test results and keep a list of the medicines you take. Where can you learn more? Go to http://www.gray.com/  Enter U144 in the search box to learn more about \"Learning About Meal Planning for Diabetes. \"  Current as of: August 31, 2020               Content Version: 12.8  © 1909-2120 [employer]. Care instructions adapted under license by [employer] (which disclaims liability or warranty for this information). If you have questions about a medical condition or this instruction, always ask your healthcare professional. [address] any warranty or liability for your use of this information. Nutrition Tips for Diabetes in Children: Care Instructions  Overview     When your child has diabetes, it's very important to keep your child's blood sugar in their target range. This means that you need to pay attention to how often and how much your child eats certain foods. But your child can still eat what your family eats. This includes occasional sweets and other favorites. Make sure that your child eats a variety of foods. Follow your child's meal plan to know how much carbohydrate your child needs for meals and snacks. Carbohydrate raises blood sugar more than any other nutrient. It's found in sugar, breads, and cereals. It's also found in fruit, starchy vegetables like potatoes and corn, milk, and yogurt. You may want to plan your child's meals and snacks with a dietitian or diabetes educator. They can help you choose the best foods and help with weight loss, if that's a goal. There are lots of things you can do to help your child enjoy meals and stay healthy.   Follow-up care is a key part of your child's treatment and safety. Be sure to make and go to all appointments, and call your doctor if your child is having problems. It's also a good idea to know your child's test results and keep a list of the medicines your child takes. How can you care for your child at home? · Learn which foods have carbohydrate (carbs). And learn how much is okay for your child. A dietitian or diabetes educator can help you and your child keep track of carbs. · Follow your child's meal plan to know how much carbohydrate your child needs for meals and snacks. · If your child needs mealtime insulin, you might be taught to adjust the amount of insulin needed to cover the amount of carbohydrate your child eats. · Plan meals to include a variety of foods. This includes grains, fruit, vegetables, dairy, and protein foods. · Use the plate format to plan your child's meals. It is a good, quick way to make sure that your child has a balanced meal. Divide your child's plate by types of foods. Put vegetables on half the plate. Put meat or other proteins on one-quarter of the plate. And put a grain or starchy vegetable (such as brown rice or a potato) in the last quarter. · Talk to your dietitian or diabetes educator about ways to add limited sweets. Your child can eat sweets once in a while. But you need to count the amount of carbs as part the daily amount. · Plan meals to include foods that contain some protein, fat, and fiber. These foods don't raise blood sugar as much as carbohydrates do. When your child eats out  · It's a good idea for you and your child to learn to estimate the serving sizes of foods that have carbohydrate. If you measure food at home, it will be easier to estimate the amount in a serving of restaurant food. · If the meal you order for your child has too much carbohydrate (such as potatoes, corn, or baked beans), ask to have a low-carbohydrate food instead. Ask for a salad or green vegetables.   · If your child uses insulin, check your child's blood sugar before and after eating out. This can help you and your child plan how much to eat in the future. Where can you learn more? Go to http://www.gray.com/  Enter P102 in the search box to learn more about \"Nutrition Tips for Diabetes in Children: Care Instructions. \"  Current as of: August 31, 2020               Content Version: 12.8  © 2006-2021 Healthwise, [facility]. Care instructions adapted under license by [employer] (which disclaims liability or warranty for this information). If you have questions about a medical condition or this instruction, always ask your healthcare professional. [address] any warranty or liability for your use of this information.

## 2021-08-17 NOTE — PROGRESS NOTES
Nichelle Messer (: 1965) is a 64 y.o. female, established patient, here for evaluation of the following chief complaint(s):  Establish Care (former patient of Leslie HERRING ) and Other (c/ o bump / possible cyst at the base of the left armpit )       ASSESSMENT/PLAN:  Below is the assessment and plan developed based on review of pertinent history, physical exam, labs, studies, and medications. 1. Hidradenitis suppurativa  -     METABOLIC PANEL, COMPREHENSIVE; Future  -     clindamycin (CLINDAGEL) 1 % topical gel; Apply  to affected area two (2) times a day. use thin film on affected area, Normal, Disp-1 mL, R-0  Martins stage I of hydradenitis suppurativa suspected. We will start with topical 1% clindamycin gel twice daily. The patient is to call if condition worsens or fails to improve. 2. Controlled type 2 diabetes mellitus without complication, without long-term current use of insulin ([facility])  -     METABOLIC PANEL, COMPREHENSIVE; Future  -     CBC WITH AUTOMATED DIFF; Future  -     HEMOGLOBIN A1C WITH EAG; Future  Continue with low carbs diet. Will check hemoglobin A1c.  3. Essential hypertension  -     lisinopril-hydroCHLOROthiazide (PRINZIDE, ZESTORETIC) 10-12.5 mg per tablet; Take 1 Tablet by mouth daily. , Normal, Disp-30 Tablet, R-5  -     METABOLIC PANEL, COMPREHENSIVE; Future  -     CBC WITH AUTOMATED DIFF; Future  -     URINALYSIS W/ RFLX MICROSCOPIC; Future  Labs ordered. Lose weight, DASH diet, salt reduction, regular exercise recommended. 4. Mild persistent asthma without complication  -     budesonide-formoteroL (SYMBICORT) 80-4.5 mcg/actuation HFAA; Take 2 Puffs by inhalation two (2) times a day., Normal, Disp-1 Inhaler, R-5  SMART therapy recommended. Use Symbicort as needed. 5. Morbid obesity with BMI of 45.0-49.9, adult ([facility] [address]  -     REFERRAL TO NUTRITION  Labs per order.  Treatment options (nutritionist consult, medically assisted weight loss, bariatric surgery) discussed with the patient. Patient is willing to begin with diet and nutritionist consult. Patient's goal is to lose 5-10% of body weight. Examples of moderate physical activity, wellness workout, diet program (Weight Watches, Diet Workshop) discussed with the patient. Handout given. 6. Dysuria       -       URINALYSIS W/ RFLX MICROSCOPIC  The patient was encouraged to drink plenty of fluids. All chart history elements were reviewed by me at the time of the visit even though marked at time of note closure. Patient understands our medical plan. Patient has provided input and agrees with goals. Alternatives have been explained and offered. All questions answered. The patient is to call if condition worsens or fails to improve. Return in about 1 month (around 9/17/2021) for routine care. SUBJECTIVE/OBJECTIVE:  HPI  1. Skin lesions:  Patient complains of swollen and tender bumps and cysts in her left armpit and the right perineum, on and off for 3 months. Patient reports intermittent discharge (pus and blood) and itching in perineum. Has tried nothing. 2.  Diabetes mellitus, type 2. Diabetes since 2019. Most recent A1c was 7.1 % in 2020. Was on Metformin 500 mg/daily, but discontinued Metformin by herself and currently follows low-carb diet. 3.  Hypertension  Stable, BP normal today   Reports compliance with the following regimen: Prinzide (lisinopril-HCTZ 10-12.5 mg) once daily. Reports no side effect. Home BP readings: not checking. Lifestyle modifications: trying to follow a plant based diet   4. Mild persistent asthma  Controlled on Symbicort twice daily. Denies chest pain, cough, shortness of breath, dyspnea on exertion, fever, chills. 6.  Obesity  Estimated body mass index is 47.31 kg/m² as calculated from the following:  Height as of this encounter: 5' (1.524 m). Weight as of this encounter: 242 lb 4 oz (109.9 kg).    Its she desires lose weight but unable to exercise due to low back pain.  7. Low back pain:  Problem longstanding. History of DDD (degenerative disc disease), lumbar. MRI in November 2018 showed advanced L4/L5 facet arthropathy, mild degenerative disc changes and mild central canal and foraminal stenosis. She is being followed by Dr. Gustavo Dobbs at [facility]  8. Dysuria  Reports discomfort and pruritis with voiding. She denies frequency, urgency, hematuria, nausea, vomiting, hesitancy, inability to void, incontinence, suprapubic pressure. Review of Systems  Pertinent items are noted in HPI    Physical Exam  General:  alert, cooperative, obese, well appearing, in no apparent distress. Neck: Supply, without thyromegaly, no cervical or supraclavicular lymphadenopathy. CV: The heart sounds are regular in rate and rhythm. There is a normal S1 and S2. There or no murmurs, rubs, or gallops. Distal pulses are intact and equal.  Lungs: Inspiratory and expiratory efforts are full and unlabored. Lung sounds are clear and equal to auscultation throughout all lung fields without wheezing, rales, or rhonchi. GI:  The abdomen is obese, soft and nontender. Bowel sounds are present in all 4 quadrants. There is no hepatosplenomegaly or other masses. There is no rebound or guarding. There is no CVA or suprapubic tenderness. Extremities: There is no clubbing, cyanosis, or edema. 2+ peripheral pulses. Skin: Single tender circumscribed nodule ~ 0.5 cm in diameter noted in the right perineum area close to the labia majora. Single nodule with sinus tract ~ 0.6 cm in diameter noted in the left axilla. An electronic signature was used to authenticate this note.   -- Alex Lucas PA-C

## 2021-08-18 LAB
BILIRUB UR QL: NEGATIVE
CLARITY: ABNORMAL
COLOR UR: YELLOW
EPITHELIAL,EPSU: ABNORMAL /HPF (ref 0–2)
GLUCOSE UR QL: ABNORMAL MG/DL
HGB UR QL STRIP: ABNORMAL
KETONES UR QL STRIP.AUTO: NEGATIVE MG/DL
LEUKOCYTE ESTERASE: NEGATIVE
NITRITE UR QL STRIP.AUTO: POSITIVE
PH UR STRIP: 6 PH (ref 5–8)
PROT UR QL STRIP: NEGATIVE MG/DL
RBC #/AREA URNS HPF: ABNORMAL /HPF
SP GR UR: 1.02 (ref 1–1.03)
URINE ASCORBIC ACID: NEGATIVE MG/DL
UROBILINOGEN UR STRIP-MCNC: <2 MG/DL
WBC URNS QL MICRO: ABNORMAL /HPF

## 2021-08-19 ENCOUNTER — TELEPHONE (OUTPATIENT)
Dept: FAMILY MEDICINE CLINIC | Age: 56
End: 2021-08-19

## 2021-08-20 DIAGNOSIS — N39.0 URINARY TRACT INFECTION WITH HEMATURIA, SITE UNSPECIFIED: Primary | ICD-10-CM

## 2021-08-20 DIAGNOSIS — E11.9 CONTROLLED TYPE 2 DIABETES MELLITUS WITHOUT COMPLICATION, WITHOUT LONG-TERM CURRENT USE OF INSULIN (HCC): Primary | ICD-10-CM

## 2021-08-20 DIAGNOSIS — R31.9 URINARY TRACT INFECTION WITH HEMATURIA, SITE UNSPECIFIED: Primary | ICD-10-CM

## 2021-08-20 RX ORDER — GRANULES FOR ORAL 3 G/1
3 POWDER ORAL ONCE
Qty: 1 PACKET | Refills: 0 | Status: SHIPPED | OUTPATIENT
Start: 2021-08-20 | End: 2021-08-20

## 2021-08-20 RX ORDER — METFORMIN HYDROCHLORIDE 500 MG/1
500 TABLET, EXTENDED RELEASE ORAL
Qty: 30 TABLET | Refills: 2 | Status: SHIPPED | OUTPATIENT
Start: 2021-08-20 | End: 2021-09-20 | Stop reason: DRUGHIGH

## 2021-08-20 NOTE — PROGRESS NOTES
[phone] (home) 2 patient identifiers verified with Tania Womack, (name/). Omer Pratt was advised that her urinalysis showed evidence of UTI (positive nitrites, positive blood, WBC). Urinalysis also revealed glucose in the urine, which is sign of uncontrolled diabetes. (advise to continue taking metformin as prescribed. (Per Omer Santa she is not a diabetic). Bhavna Velasquez PA-C is treating her with antibiotics (fosfomycin 3 g pack once) and medication has been sent to the pharmacy. Omer Pratt was also advise to call the office and schedule an appointment if symptoms persist after treatment, to keep scheduled appointment 2021, and to have labs done 3-5 days prior.      She needs a refill on metformin

## 2021-08-20 NOTE — PROGRESS NOTES
Please contact the patient  Patient urinalysis showed evidence of UTI (positive nitrites, positive blood, WBC). We will treat patient with antibiotics (fosfomycin 3 g pack once). Medication sent to the pharmacy. Please advise patient to call the office and schedule an appointment if symptoms persist after treatment. Urinalysis also revealed glucose in the urine, which is sign of uncontrolled diabetes. Please advise patient to follow diabetic diet and take Metformin as prescribed.

## 2021-09-08 ENCOUNTER — HOSPITAL ENCOUNTER (OUTPATIENT)
Dept: NUTRITION | Age: 56
Discharge: HOME | End: 2021-09-08
Payer: COMMERCIAL

## 2021-09-08 PROCEDURE — 97802 MEDICAL NUTRITION INDIV IN: CPT

## 2021-09-10 NOTE — PROGRESS NOTES
[address] [place] [address]     Nutrition Assessment  Medical Nutrition Therapy   Outpatient Initial Evaluation         Patient Name: Leann Pina : 1965   Treatment Diagnosis: Obesity     Referral Source: Kortney Santana [facility]): 2021     Gender: female Age: 64 y.o. Ht: 59 in Wt: 244  lb  kg   BMI: 52 BMR   Male  BMR Female 1604     Past Medical History:  Obesity       Pertinent Medications:   Glucophage     Biochemical Data:   Lab Results   Component Value Date/Time    Hemoglobin A1c 7.1 (H) 2020 12:00 AM     Lab Results   Component Value Date/Time    Sodium 139 2020 12:00 AM    Potassium 4.3 2020 12:00 AM    Chloride 101 2020 12:00 AM    CO2 24 2020 12:00 AM    Anion gap 9 09/10/2017 11:38 AM    Glucose 123 (H) 2020 12:00 AM    BUN 7 2020 12:00 AM    Creatinine 0.75 2020 12:00 AM    BUN/Creatinine ratio 9 2020 12:00 AM    GFR est  2020 12:00 AM    GFR est non-AA 91 2020 12:00 AM    Calcium 8.8 2020 12:00 AM    Bilirubin, total 0.2 2020 12:00 AM    Alk. phosphatase 102 2020 12:00 AM    Protein, total 7.0 2020 12:00 AM    Albumin 3.6 (L) 2020 12:00 AM    Globulin 5.4 (H) 09/10/2017 11:38 AM    A-G Ratio 1.1 (L) 2020 12:00 AM    ALT (SGPT) 13 2020 12:00 AM    AST (SGOT) 16 2020 12:00 AM     Lab Results   Component Value Date/Time    Cholesterol, total 191 2020 12:00 AM    HDL Cholesterol 37 (L) 2020 12:00 AM    LDL, calculated 133 (H) 2020 12:00 AM    VLDL, calculated 21 2020 12:00 AM    Triglyceride 104 2020 12:00 AM    CHOL/HDL Ratio 5.4 (H) 2017 10:09 AM     Lab Results   Component Value Date/Time    ALT (SGPT) 13 2020 12:00 AM    AST (SGOT) 16 2020 12:00 AM    Alk.  phosphatase 102 2020 12:00 AM    Bilirubin, total 0.2 2020 12:00 AM     Lab Results   Component Value Date/Time    Creatinine 0.75 02/17/2020 12:00 AM     Lab Results   Component Value Date/Time    BUN 7 02/17/2020 12:00 AM    BUN, POC 5 (L) 06/01/2017 09:34 AM     Lab Results   Component Value Date/Time    Microalb/Creat ratio (ug/mg creat.) 8.0 05/18/2019 12:00 AM        Assessment:   Patient is a 64year old female who visits RD for insight on how to lose weight and improve her Hgb A1c. Patient shares that she works 8 hours a day at the [employer] from home. .  Patient's sleeping habits consists of a 6-8 hours a night. Food & Nutrition: Patient eats 3 meals a day and snacks. Breakfast may consist of fruit for breakfast.  A lunch choice may be salad, salmon, or shrimp. Dinner choices consist of salmon and vegetables. Snacks may be skinny pop popcorn. Estimate Needs   Calories: 2490-6864  Protein: 60-90 Carbs: <130 Fat: 60   Kcal/day  g/day  g/day  g/day                            Nutrition Diagnosis Overweight/obesity related to excessive energy intake as evidenced by a BMI of 47. Altered nutrition related laboratory values related to diabetes as evidenced by Hgb A1c  7.1. Nutrition Intervention &  Education: Encouraged pt to drink >64 ounces of only calorie free or very low calorie/carb beverages only. Educated pt on all carbohydrates found in foods and encouraged no more than 35-40 gm/meal and 15-20 gm/snack. Patient was educated on the importance of making lifestyle changes such as:  1. Eating off a smaller plate and drinking from smaller glasses. 2. Increasing activity. 3. Menu planning and tracking. Recommend My Fitness Pal for tracking. 4. The importance of eating breakfast.  5. Appropriate snacks. 6. Portion control. 7. The importance of good sleeping habits. 8. How to read a label. Patient was encouraged to do this before purchasing and consuming an item.      Handouts Provided: [x]  Carbohydrates  [x]  Protein  [x]  Non-starchy Vegatbles  [x]  Food Label  [x]  Meal and Snack Ideas  [x]  Food Journals []  Diabetes  []  Cholesterol  []  Sodium  [x]  Gen Nutr Guidelines  []  SBGM Guidelines  []  Others:   Information Reviewed with: Patient    Readiness to Change Stage:   []  Pre-contemplative    []  Contemplative  []  Preparation               [x]  Action                  []  Maintenance   Potential Barriers to Learning: []  Decline in memory    []  Language barrier   []  Other:  []  Emotional                  []  Limited mobility  []  Lack of motivation     [] Vision, hearing or cognitive impairment   Expected Compliance: Good. Nutritional Goal - To promote lifestyle changes to result in:    [x]  Weight loss  [x]  Improved diabetic control  []  Decreased cholesterol levels  []  Decreased blood pressure  []  Weight maintenance []  Preventing any interactions associated with food allergies  []  Adequate weight gain toward goal weight  []  Other:        Patient Goals:   Patient desires to lose weight and decrease her Hgb A1c. Dietitian Signature:  Quan Mchugh RD Date: 09/10/2021   Follow-up: Scheduled:  10/11/2021  4:30 pm

## 2021-09-16 ENCOUNTER — HOSPITAL ENCOUNTER (OUTPATIENT)
Dept: LAB | Age: 56
Discharge: HOME | End: 2021-09-16

## 2021-09-16 LAB — SENTARA SPECIMEN COL,SENBCF: NORMAL

## 2021-09-16 PROCEDURE — 99001 SPECIMEN HANDLING PT-LAB: CPT

## 2021-09-17 LAB
A-G RATIO,AGRAT: 1.1 RATIO (ref 1.1–2.6)
ABSOLUTE LYMPHOCYTE COUNT, 10803: 4.8 K/UL (ref 1–4.8)
ALBUMIN SERPL-MCNC: 3.9 G/DL (ref 3.5–5)
ALP SERPL-CCNC: 114 U/L (ref 25–115)
ALT SERPL-CCNC: 22 U/L (ref 5–40)
ANION GAP SERPL CALC-SCNC: 10 MMOL/L (ref 3–15)
AST SERPL W P-5'-P-CCNC: 18 U/L (ref 10–37)
AVG GLU, 10930: 213 MG/DL (ref 91–123)
BASOPHILS # BLD: 0.1 K/UL (ref 0–0.2)
BASOPHILS NFR BLD: 1 % (ref 0–2)
BILIRUB SERPL-MCNC: 0.3 MG/DL (ref 0.2–1.2)
BUN SERPL-MCNC: 10 MG/DL (ref 6–22)
CALCIUM SERPL-MCNC: 9 MG/DL (ref 8.4–10.5)
CHLORIDE SERPL-SCNC: 101 MMOL/L (ref 98–110)
CO2 SERPL-SCNC: 27 MMOL/L (ref 20–32)
CREAT SERPL-MCNC: 0.7 MG/DL (ref 0.5–1.2)
EOSINOPHIL # BLD: 0.4 K/UL (ref 0–0.5)
EOSINOPHIL NFR BLD: 3 % (ref 0–6)
ERYTHROCYTE [DISTWIDTH] IN BLOOD BY AUTOMATED COUNT: 13.2 % (ref 10–15.5)
GFRAA, 66117: >60
GFRNA, 66118: >60
GLOBULIN,GLOB: 3.4 G/DL (ref 2–4)
GLUCOSE SERPL-MCNC: 174 MG/DL (ref 70–99)
GRANULOCYTES,GRANS: 47 % (ref 40–75)
HBA1C MFR BLD HPLC: 9.1 % (ref 4.8–5.6)
HCT VFR BLD AUTO: 46.4 % (ref 35.1–48)
HGB BLD-MCNC: 13.7 G/DL (ref 11.7–16)
LYMPHOCYTES, LYMLT: 44 % (ref 20–45)
MCH RBC QN AUTO: 28 PG (ref 26–34)
MCHC RBC AUTO-ENTMCNC: 30 G/DL (ref 31–36)
MCV RBC AUTO: 94 FL (ref 81–99)
MONOCYTES # BLD: 0.5 K/UL (ref 0.1–1)
MONOCYTES NFR BLD: 5 % (ref 3–12)
NEUTROPHILS # BLD AUTO: 5 K/UL (ref 1.8–7.7)
PLATELET # BLD AUTO: 371 K/UL (ref 140–440)
PMV BLD AUTO: 9.2 FL (ref 9–13)
POTASSIUM SERPL-SCNC: 4.3 MMOL/L (ref 3.5–5.5)
PROT SERPL-MCNC: 7.3 G/DL (ref 6.4–8.3)
RBC # BLD AUTO: 4.92 M/UL (ref 3.8–5.2)
SODIUM SERPL-SCNC: 138 MMOL/L (ref 133–145)
WBC # BLD AUTO: 10.8 K/UL (ref 4–11)

## 2021-09-20 ENCOUNTER — OFFICE VISIT (OUTPATIENT)
Dept: FAMILY MEDICINE CLINIC | Age: 56
End: 2021-09-20
Payer: COMMERCIAL

## 2021-09-20 VITALS
OXYGEN SATURATION: 98 % | BODY MASS INDEX: 46.72 KG/M2 | DIASTOLIC BLOOD PRESSURE: 84 MMHG | RESPIRATION RATE: 16 BRPM | HEIGHT: 60 IN | HEART RATE: 81 BPM | TEMPERATURE: 98 F | SYSTOLIC BLOOD PRESSURE: 118 MMHG | WEIGHT: 238 LBS

## 2021-09-20 DIAGNOSIS — J45.30 MILD PERSISTENT ASTHMA WITHOUT COMPLICATION: ICD-10-CM

## 2021-09-20 DIAGNOSIS — E66.01 MORBID OBESITY WITH BMI OF 45.0-49.9, ADULT (HCC): ICD-10-CM

## 2021-09-20 DIAGNOSIS — I10 ESSENTIAL HYPERTENSION: ICD-10-CM

## 2021-09-20 DIAGNOSIS — Z12.31 ENCOUNTER FOR SCREENING MAMMOGRAM FOR MALIGNANT NEOPLASM OF BREAST: ICD-10-CM

## 2021-09-20 DIAGNOSIS — Z13.6 ENCOUNTER FOR LIPID SCREENING FOR CARDIOVASCULAR DISEASE: ICD-10-CM

## 2021-09-20 DIAGNOSIS — E11.9 CONTROLLED TYPE 2 DIABETES MELLITUS WITHOUT COMPLICATION, WITHOUT LONG-TERM CURRENT USE OF INSULIN (HCC): Primary | ICD-10-CM

## 2021-09-20 DIAGNOSIS — Z13.220 ENCOUNTER FOR LIPID SCREENING FOR CARDIOVASCULAR DISEASE: ICD-10-CM

## 2021-09-20 LAB
ALBUMIN UR QL STRIP: 80 MG/L
CREATININE, URINE POC: 100 MG/DL
MICROALBUMIN/CREAT RATIO POC: ABNORMAL MG/G

## 2021-09-20 PROCEDURE — 99214 OFFICE O/P EST MOD 30 MIN: CPT | Performed by: STUDENT IN AN ORGANIZED HEALTH CARE EDUCATION/TRAINING PROGRAM

## 2021-09-20 PROCEDURE — 82044 UR ALBUMIN SEMIQUANTITATIVE: CPT | Performed by: STUDENT IN AN ORGANIZED HEALTH CARE EDUCATION/TRAINING PROGRAM

## 2021-09-20 RX ORDER — LANCETS
EACH MISCELLANEOUS
Qty: 1 EACH | Refills: 11 | Status: SHIPPED | OUTPATIENT
Start: 2021-09-20

## 2021-09-20 RX ORDER — INSULIN PUMP SYRINGE, 3 ML
EACH MISCELLANEOUS
Qty: 1 KIT | Refills: 0 | Status: SHIPPED | OUTPATIENT
Start: 2021-09-20

## 2021-09-20 RX ORDER — METFORMIN HYDROCHLORIDE 500 MG/1
1000 TABLET, EXTENDED RELEASE ORAL
Qty: 60 TABLET | Refills: 0 | Status: SHIPPED | OUTPATIENT
Start: 2021-09-20 | End: 2022-10-21 | Stop reason: SDUPTHER

## 2021-09-20 NOTE — PATIENT INSTRUCTIONS
Learning About Meal Planning for Diabetes  Why plan your meals? Meal planning can be a key part of managing diabetes. Planning meals and snacks with the right balance of carbohydrate, protein, and fat can help you keep your blood sugar at the target level you set with your doctor. You don't have to eat special foods. You can eat what your family eats, including sweets once in a while. But you do have to pay attention to how often you eat and how much you eat of certain foods. You may want to work with a dietitian or a certified diabetes educator. He or she can give you tips and meal ideas and can answer your questions about meal planning. This health professional can also help you reach a healthy weight if that is one of your goals. What plan is right for you? Your dietitian or diabetes educator may suggest that you start with the plate format or carbohydrate counting. The plate format  The plate format is a simple way to help you manage how you eat. You plan meals by learning how much space each food should take on a plate. Using the plate format helps you spread carbohydrate throughout the day. It can make it easier to keep your blood sugar level within your target range. It also helps you see if you're eating healthy portion sizes. To use the plate format, you put non-starchy vegetables on half your plate. Add meat or meat substitutes on one-quarter of the plate. Put a grain or starchy vegetable (such as brown rice or a potato) on the final quarter of the plate. You can add a small piece of fruit and some low-fat or fat-free milk or yogurt, depending on your carbohydrate goal for each meal.  Here are some tips for using the plate format:  · Make sure that you are not using an oversized plate. A 9-inch plate is best. Many restaurants use larger plates. · Get used to using the plate format at home. Then you can use it when you eat out. · Write down your questions about using the plate format.  Talk to your doctor, a dietitian, or a diabetes educator about your concerns. Carbohydrate counting  With carbohydrate counting, you plan meals based on the amount of carbohydrate in each food. Carbohydrate raises blood sugar higher and more quickly than any other nutrient. It is found in desserts, breads and cereals, and fruit. It's also found in starchy vegetables such as potatoes and corn, grains such as rice and pasta, and milk and yogurt. Spreading carbohydrate throughout the day helps keep your blood sugar levels within your target range. Your daily amount depends on several things, including your weight, how active you are, which diabetes medicines you take, and what your goals are for your blood sugar levels. A registered dietitian or diabetes educator can help you plan how much carbohydrate to include in each meal and snack. A guideline for your daily amount of carbohydrate is:  · 45 to 60 grams at each meal. That's about the same as 3 to 4 carbohydrate servings. · 15 to 20 grams at each snack. That's about the same as 1 carbohydrate serving. The Nutrition Facts label on packaged foods tells you how much carbohydrate is in a serving of the food. First, look at the serving size on the food label. Is that the amount you eat in a serving? All of the nutrition information on a food label is based on that serving size. So if you eat more or less than that, you'll need to adjust the other numbers. Total carbohydrate is the next thing you need to look for on the label. If you count carbohydrate servings, one serving of carbohydrate is 15 grams. For foods that don't come with labels, such as fresh fruits and vegetables, you'll need a guide that lists carbohydrate in these foods. Ask your doctor, dietitian, or diabetes educator about books or other nutrition guides you can use.   If you take insulin, you need to know how many grams of carbohydrate are in a meal. This lets you know how much rapid-acting insulin to take before you eat. If you use an insulin pump, you get a constant rate of insulin during the day. So the pump must be programmed at meals to give you extra insulin to cover the rise in blood sugar after meals. When you know how much carbohydrate you will eat, you can take the right amount of insulin. Or, if you always use the same amount of insulin, you need to make sure that you eat the same amount of carbohydrate at meals. If you need more help to understand carbohydrate counting and food labels, ask your doctor, dietitian, or diabetes educator. How can you plan healthy meals? Here are some tips to get started:  · Plan your meals a week at a time. Don't forget to include snacks too. · Use cookbooks or online recipes to plan several main meals. Plan some quick meals for busy nights. You also can double some recipes that freeze well. Then you can save half for other busy nights when you don't have time to cook. · Make sure you have the ingredients you need for your recipes. If you're running low on basic items, put these items on your shopping list too. · List foods that you use to make breakfasts, lunches, and snacks. List plenty of fruits and vegetables. · Post this list on the refrigerator. Add to it as you think of more things you need. · Take the list to the store to do your weekly shopping. Follow-up care is a key part of your treatment and safety. Be sure to make and go to all appointments, and call your doctor if you are having problems. It's also a good idea to know your test results and keep a list of the medicines you take. Where can you learn more? Go to http://www.gray.com/  Enter H231 in the search box to learn more about \"Learning About Meal Planning for Diabetes. \"  Current as of: December 17, 2020               Content Version: 13.0  © 4777-0222 Healthwise, Incorporated.    Care instructions adapted under license by [employer] (which disclaims liability or warranty for this information). If you have questions about a medical condition or this instruction, always ask your healthcare professional. [address] any warranty or liability for your use of this information. Learning About Carbohydrate (Carb) Counting and Eating Out When You Have Diabetes  Why plan your meals? Meal planning can be a key part of managing diabetes. Planning meals and snacks with the right balance of carbohydrate, protein, and fat can help you keep your blood sugar at the target level you set with your doctor. You don't have to eat special foods. You can eat what your family eats, including sweets once in a while. But you do have to pay attention to how often you eat and how much you eat of certain foods. You may want to work with a dietitian or a certified diabetes educator. He or she can give you tips and meal ideas and can answer your questions about meal planning. This health professional can also help you reach a healthy weight if that is one of your goals. What should you know about eating carbs? Managing the amount of carbohydrate (carbs) you eat is an important part of healthy meals when you have diabetes. Carbohydrate is found in many foods. · Learn which foods have carbs. And learn the amounts of carbs in different foods. ? Bread, cereal, pasta, and rice have about 15 grams of carbs in a serving. A serving is 1 slice of bread (1 ounce), ½ cup of cooked cereal, or 1/3 cup of cooked pasta or rice. ? Fruits have 15 grams of carbs in a serving. A serving is 1 small fresh fruit, such as an apple or orange; ½ of a banana; ½ cup of cooked or canned fruit; ½ cup of fruit juice; 1 cup of melon or raspberries; or 2 tablespoons of dried fruit. ? Milk and no-sugar-added yogurt have 15 grams of carbs in a serving. A serving is 1 cup of milk or 2/3 cup of no-sugar-added yogurt. ? Starchy vegetables have 15 grams of carbs in a serving.  A serving is ½ cup of mashed potatoes or sweet potato; 1 cup winter squash; ½ of a small baked potato; ½ cup of cooked beans; or ½ cup cooked corn or green peas. · Learn how much carbs to eat each day and at each meal. A dietitian or CDE can teach you how to keep track of the amount of carbs you eat. This is called carbohydrate counting. · If you are not sure how to count carbohydrate grams, use the Plate Method to plan meals. It is a good, quick way to make sure that you have a balanced meal. It also helps you spread carbs throughout the day. ? Divide your plate by types of foods. Put non-starchy vegetables on half the plate, meat or other protein food on one-quarter of the plate, and a grain or starchy vegetable in the final quarter of the plate. To this you can add a small piece of fruit and 1 cup of milk or yogurt, depending on how many carbs you are supposed to eat at a meal.  · Try to eat about the same amount of carbs at each meal. Do not \"save up\" your daily allowance of carbs to eat at one meal.  · Proteins have very little or no carbs per serving. Examples of proteins are beef, chicken, turkey, fish, eggs, tofu, cheese, cottage cheese, and peanut butter. A serving size of meat is 3 ounces, which is about the size of a deck of cards. Examples of meat substitute serving sizes (equal to 1 ounce of meat) are 1/4 cup of cottage cheese, 1 egg, 1 tablespoon of peanut butter, and ½ cup of tofu. How can you eat out and still eat healthy? · Learn to estimate the serving sizes of foods that have carbohydrate. If you measure food at home, it will be easier to estimate the amount in a serving of restaurant food. · If the meal you order has too much carbohydrate (such as potatoes, corn, or baked beans), ask to have a low-carbohydrate food instead. Ask for a salad or green vegetables. · If you use insulin, check your blood sugar before and after eating out to help you plan how much to eat in the future.   · If you eat more carbohydrate at a meal than you had planned, take a walk or do other exercise. This will help lower your blood sugar. What are some tips for eating healthy? · Limit saturated fat, such as the fat from meat and dairy products. This is a healthy choice because people who have diabetes are at higher risk of heart disease. So choose lean cuts of meat and nonfat or low-fat dairy products. Use olive or canola oil instead of butter or shortening when cooking. · Don't skip meals. Your blood sugar may drop too low if you skip meals and take insulin or certain medicines for diabetes. · Check with your doctor before you drink alcohol. Alcohol can cause your blood sugar to drop too low. Alcohol can also cause a bad reaction if you take certain diabetes medicines. Follow-up care is a key part of your treatment and safety. Be sure to make and go to all appointments, and call your doctor if you are having problems. It's also a good idea to know your test results and keep a list of the medicines you take. Where can you learn more? Go to http://www.givens.com/  Enter I147 in the search box to learn more about \"Learning About Carbohydrate (Carb) Counting and Eating Out When You Have Diabetes. \"  Current as of: December 17, 2020               Content Version: 13.0  © 2006-2021 Healthwise, Incorporated. Care instructions adapted under license by [employer] (which disclaims liability or warranty for this information). If you have questions about a medical condition or this instruction, always ask your healthcare professional. [place] [address] any warranty or liability for your use of this information. Learning About Diabetes and Exercise  Can you exercise if you have diabetes? When you have diabetes, it's important to get regular exercise. It can help you manage your blood sugar level.  You can still play sports, run, ride a bike, swim, and do other activities when you have diabetes. How does exercise help when you have diabetes? Getting regular exercise can help control your blood sugar. Your body turns the food you eat into glucose, a type of sugar. You need this sugar for energy. When you have diabetes, the sugar builds up in your blood. But when you exercise, your body uses sugar. This helps keep it from building up in your blood and results in lower blood sugar and better control of diabetes. Exercise may help you in other ways too. It can help you reach and stay at a healthy weight. It also helps improve blood pressure and cholesterol, which can reduce the risk of heart disease. Exercise can make you feel stronger and happier. It can help you relax and sleep better. And it can give you confidence in other things you do. Exercising safely when you have diabetes  Before you start a new exercise program, talk to your doctor about how and when to exercise. Some types of exercise can be harmful if your diabetes is causing other problems, such as problems with your feet. Your doctor can tell you what types of exercise are good choices for you. Here are some general safety tips. · Take steps to avoid blood sugar problems. ? Check your blood sugar before and after you exercise. ? Ask your doctor what blood sugar range is safe for you when you exercise. ? If you take medicine or insulin that lowers blood sugar, check your blood sugar before you exercise. ? If your blood sugar is less than 90 mg/dL, you may need to eat a carbohydrate snack first.  ? Be careful when you exercise if your blood sugar is too high. Make sure to drink plenty of water. · Try to exercise at about the same time each day. This may help keep your blood sugar steady. If you want to exercise more, slowly increase how hard or long you exercise. · Have someone with you when you exercise. Or exercise at a gym. You may need help if your blood sugar drops too low.   · Keep some quick-sugar food with you.   You may get symptoms of low blood sugar during exercise or up to 24 hours later. · Use proper footwear and the right equipment. · Pay attention to your body. If you are used to exercising and notice that you cannot do as much as usual, talk to your doctor. Follow-up care is a key part of your treatment and safety. Be sure to make and go to all appointments, and call your doctor if you are having problems. It's also a good idea to know your test results and keep a list of the medicines you take. Where can you learn more? Go to http://www.gray.com/  Enter C492 in the search box to learn more about \"Learning About Diabetes and Exercise. \"  Current as of: August 31, 2020               Content Version: 13.0  © 2006-2021 [employer]. Care instructions adapted under license by [employer] (which disclaims liability or warranty for this information). If you have questions about a medical condition or this instruction, always ask your healthcare professional. [address] any warranty or liability for your use of this information. Nutrition Tips for Diabetes: After Your Visit  Your Care Instructions  A healthy diet is important to manage diabetes. It helps you lose weight (if you need to) and keep it off. It gives you the nutrition and energy your body needs and helps prevent heart disease. But a diet for diabetes does not mean that you have to eat special foods. You can eat what your family eats, including occasional sweets and other favorites. But you do have to pay attention to how often you eat and how much you eat of certain foods. The right plan for you will give you meals that help you keep your blood sugar at healthy levels. Try to eat a variety of foods and to spread carbohydrate throughout the day. Carbohydrate raises blood sugar higher and more quickly than any other nutrient does.  Carbohydrate is found in sugar, breads and cereals, fruit, starchy vegetables such as potatoes and corn, and milk and yogurt. You may want to work with a dietitian or diabetes educator to help you plan meals and snacks. A dietitian or diabetes educator also can help you lose weight if that is one of your goals. The following tips can help you enjoy your meals and stay healthy. Follow-up care is a key part of your treatment and safety. Be sure to make and go to all appointments, and call your doctor if you are having problems. Its also a good idea to know your test results and keep a list of the medicines you take. How can you care for yourself at home? · Learn which foods have carbohydrate and how much carbohydrate to eat. A dietitian or diabetes educator can help you learn to keep track of how much carbohydrate you eat. · Spread carbohydrate throughout the day. Eat some carbohydrate at all meals, but do not eat too much at any one time. · Plan meals to include food from all the food groups. These are the food groups and some example portion sizes:  ¨ Grains: 1 slice of bread (1 ounce), ½ cup of cooked cereal, and 1/3 cup of cooked pasta or rice. These have about 15 grams of carbohydrate in a serving. Choose whole grains such as whole wheat bread or crackers, oatmeal, and brown rice more often than refined grains. ¨ Fruit: 1 small fresh fruit, such as an apple or orange; ½ of a banana; ½ cup of chopped, cooked, or canned fruit; ½ cup of fruit juice; 1 cup of melon or raspberries; and 2 tablespoons of dried fruit. These have about 15 grams of carbohydrate in a serving. ¨ Dairy: 1 cup of nonfat or low-fat milk and 2/3 cup of plain yogurt. These have about 15 grams of carbohydrate in a serving. ¨ Protein foods: Beef, chicken, turkey, fish, eggs, tofu, cheese, cottage cheese, and peanut butter. A serving size of meat is 3 ounces, which is about the size of a deck of cards.  Examples of meat substitute serving sizes (equal to 1 ounce of meat) are 1/4 cup of cottage cheese, 1 egg, 1 tablespoon of peanut butter, and ½ cup of tofu. These have very little or no carbohydrate per serving. ¨ Vegetables: Starchy vegetables such as ½ cup of cooked dried beans, peas, potatoes, or corn have about 15 grams of carbohydrate. Nonstarchy vegetables have very little carbohydrate, such as 1 cup of raw leafy vegetables (such as spinach), ½ cup of other vegetables (cooked or chopped), and 3/4 cup of vegetable juice. · Use the plate format to plan meals. It is a good, quick way to make sure that you have a balanced meal. It also helps you spread carbohydrate throughout the day. You divide your plate by types of foods. Put vegetables on half the plate, meat or meat substitutes on one-quarter of the plate, and a grain or starchy vegetable (such as brown rice or a potato) in the final quarter of the plate. To this you can add a small piece of fruit and 1 cup of milk or yogurt, depending on how much carbohydrate you are supposed to eat at a meal.  · Talk to your dietitian or diabetes educator about ways to add limited amounts of sweets into your meal plan. You can eat these foods now and then, as long as you include the amount of carbohydrate they have in your daily carbohydrate allowance. · If you drink alcohol, limit it to no more than 1 drink a day for women and 2 drinks a day for men. If you are pregnant, no amount of alcohol is known to be safe. · Protein, fat, and fiber do not raise blood sugar as much as carbohydrate does. If you eat a lot of these nutrients in a meal, your blood sugar will rise more slowly than it would otherwise. · Limit saturated fats, such as those from meat and dairy products. Try to replace it with monounsaturated fat, such as olive oil. This is a healthier choice because people who have diabetes are at higher-than-average risk of heart disease. But use a modest amount of olive oil.  A tablespoon of olive oil has 14 grams of fat and 120 calories. · Exercise lowers blood sugar. If you take insulin by shots or pump, you can use less than you would if you were not exercising. Keep in mind that timing matters. If you exercise within 1 hour after a meal, your body may need less insulin for that meal than it would if you exercised 3 hours after the meal. Test your blood sugar to find out how exercise affects your need for insulin. · Exercise on most days of the week. Aim for at least 30 minutes. Exercise helps you stay at a healthy weight and helps your body use insulin. Walking is an easy way to get exercise. Gradually increase the amount you walk every day. You also may want to swim, bike, or do other activities. When you eat out  · Learn to estimate the serving sizes of foods that have carbohydrate. If you measure food at home, it will be easier to estimate the amount in a serving of restaurant food. · If the meal you order has too much carbohydrate (such as potatoes, corn, or baked beans), ask to have a low-carbohydrate food instead. Ask for a salad or green vegetables. · If you use insulin, check your blood sugar before and after eating out to help you plan how much to eat in the future. · If you eat more carbohydrate at a meal than you had planned, take a walk or do other exercise. This will help lower your blood sugar. Where can you learn more? Go to [employer]  Enter Z738 in the search box to learn more about \"Nutrition Tips for Diabetes: After Your Visit. \"   © 1969-7115 Healthwise, Incorporated. Care instructions adapted under license by [facility] (which disclaims liability or warranty for this information). This care instruction is for use with your licensed healthcare professional. If you have questions about a medical condition or this instruction, always ask your healthcare professional. [address] any warranty or liability for your use of this information.   Content Version: 54.5.743405; Current as of: June 4, 2014                 Mammogram: About This Test  What is it? A mammogram is an X-ray of the breast that is used to screen for breast cancer. This test can find tumors that are too small for you or your doctor to feel. Cancer is most easily treated when it is found at an early stage. Why is this test done? A mammogram is done to:  · Look for breast cancer when there are no symptoms. · Find breast cancer when there are symptoms. Symptoms of breast cancer may include a lump or thickening in the breast, nipple discharge, or dimpling of the skin on one area of the breast.  · Find an area of suspicious breast tissue to remove for an exam under a microscope (biopsy). How do you prepare for the test?  If you've had a mammogram before at another clinic, have the results sent or bring them with you to your appointment. On the day of the mammogram, don't use any deodorant. And don't use perfume, powders, or ointments near or on your breasts. The residue left on your skin by these substances may interfere with the X-rays. How is the test done? · You will need to take off any jewelry that might interfere with the X-ray pictures. · You will need to take off your clothes above the waist.  · You will be given a cloth or paper gown to use during the test.  · You probably will stand during the mammogram.  · One at a time, your breasts will be placed on a flat plate. · Another plate is then pressed firmly against your breast to help flatten out the breast tissue. You may be asked to lift your arm. · For a few seconds while the X-ray picture is being taken, you will need to hold your breath. · At least two pictures are taken of each breast. One is taken from the top and one from the side. How does having a mammogram feel? A mammogram is often uncomfortable but rarely painful.  If you have sensitive or fragile skin or a skin condition, let the technician know before you have your exam. If you have menstrual periods, the procedure is more comfortable when done within 2 weeks after your period has ended. Having your breasts flattened is usually uncomfortable, but it helps the technician get the best images. How long does the test take? · The test will take about 10 to 15 minutes. You may be in the clinic for up to an hour. · You may be asked to wait a few minutes while the images are checked to make sure they don't need to be redone. What happens after the test?  · You will probably be able to go home right away. · You can go back to your usual activities right away. Follow-up care is a key part of your treatment and safety. Be sure to make and go to all appointments, and call your doctor if you are having problems. It's also a good idea to keep a list of the medicines you take. Ask your doctor when you can expect to have your test results. Where can you learn more? Go to http://www.givens.com/  Enter [id number] in the search box to learn more about \"Mammogram: About This Test.\"  Current as of: December 17, 2020               Content Version: 13.0  © 8964-5972 [employer]. Care instructions adapted under license by [employer] (which disclaims liability or warranty for this information). If you have questions about a medical condition or this instruction, always ask your healthcare professional. [address] any warranty or liability for your use of this information.

## 2021-09-20 NOTE — PROGRESS NOTES
Chief Complaint   Patient presents with    Asthma    Cholesterol Problem    Diabetes    Hypertension    Results     review results        1. \"Have you been to the ER, urgent care clinic since your last visit? Hospitalized since your last visit? \" No    2. \"Have you seen or consulted any other health care providers outside of the [address] [place] [address] since your last visit? \" No     3. For patients aged 39-70: Has the patient had a colonoscopy? Yes, but HM not satisfied. Rooming MA/LPN to request most recent results     If the patient is female:    4. For patients aged 41-77: Has the patient had a mammogram within the past 2 years? Yes, but HM not satisfied. Rooming MA/LPN to request most recent results, last noted in 2016    5. For patients aged 21-30: Has the patient had a pap smear?  Yes, HM satisfied with blue hyperlink    Annual eye exam: March of 2021 Dr. Anoop Herrera [facility]) [phone] (left voicemail requesting last eye exam)   Pneumococcal vaccine: N/A  Flu vaccine: N/A  Patient instructed to remove shoes: Yes    Declined influenza vaccine

## 2021-10-11 ENCOUNTER — HOSPITAL ENCOUNTER (OUTPATIENT)
Dept: NUTRITION | Age: 56
Discharge: HOME | End: 2021-10-11
Payer: COMMERCIAL

## 2021-10-11 PROCEDURE — 97803 MED NUTRITION INDIV SUBSEQ: CPT

## 2021-10-14 NOTE — PROGRESS NOTES
NUTRITION  FOLLOW-UP TREATMENT NOTE  Patient Name: Fredrick Gonzáles         Date: 10/11/2021  : 1965    YES/NO Patient  Verified  Diagnosis:  Morbid obesity      Total Treatment Time (min):   30     SUBJECTIVE/ASSESSMENT:  Patient is doing well. She is very pleased with her journey. Her waist circumference has decreased as well. Changes in medication or medical history? Any new allergies, surgeries or procedures? YES/NO    If yes, update Summary List   None reported        Current Wt: 241# Previous Wt: 244# Wt Change: 3#     Achievement of Goals: 1. Drinking more water. 2.  Making better food choices. Patient Education:  [x]  Review current plan with patient   []  Other:    Handouts/  Information Provided: []  Carbohydrates  []  Protein  []  Fiber  []  Serving Sizes  []  Fluids  []  General guidelines []  Diabetes  []  Cholesterol  []  Sodium  []  SBGM  []  Food Journals  []  Others:      New Patient Goals: 1.  >64 ounces of water. 2.  Keep moving. 3.  Protect the seep.      PLAN    [x]  Continue on current plan []  Follow-up PRN   []  Discharge due to :    [x]  Next appt: 2021       Dietitian: Belia Sparks RD    Date: 10/11/2021

## 2021-11-15 ENCOUNTER — HOSPITAL ENCOUNTER (OUTPATIENT)
Dept: NUTRITION | Age: 56
Discharge: HOME | End: 2021-11-15
Payer: COMMERCIAL

## 2021-11-15 PROCEDURE — 97803 MED NUTRITION INDIV SUBSEQ: CPT

## 2021-11-19 NOTE — PROGRESS NOTES
NUTRITION  FOLLOW-UP TREATMENT NOTE  Patient Name: Gail Choudhary         Date: 11/15/2021  : 1965    YES/NO Patient  Verified  Diagnosis: Morbid obesity      Total Treatment Time (min):   30     SUBJECTIVE/ASSESSMENT:  Patient is doing well. She continues to make progress. Changes in medication or medical history? Any new allergies, surgeries or procedures? YES/NO    If yes, update Summary List   None reported       Current Wt: 239# Previous Wt: 244# Wt Change: 5#     Achievement of Goals: 1. Patient lost another 2#; patients total weight loss is 5#. Patient Education:  [x]  Review current plan with patient   []  Other:    Handouts/  Information Provided: []  Carbohydrates  []  Protein  []  Fiber  []  Serving Sizes  []  Fluids  []  General guidelines []  Diabetes  []  Cholesterol  []  Sodium  []  SBGM  []  Food Journals  []  Others:      New Patient Goals: 1. Decrease added sugar at each meal.  2.  Protect your sleep.      PLAN    [x]  Continue on current plan []  Follow-up PRN   []  Discharge due to :    [x]  Next appt: 2022  At 4:30 pm     Dietitian: Rosie Avalos RD    Date: 11/15/2021

## 2022-01-03 ENCOUNTER — HOSPITAL ENCOUNTER (OUTPATIENT)
Dept: NUTRITION | Age: 57
End: 2022-01-03
Payer: COMMERCIAL

## 2022-01-10 ENCOUNTER — HOSPITAL ENCOUNTER (OUTPATIENT)
Dept: NUTRITION | Age: 57
Discharge: HOME | End: 2022-01-10
Payer: COMMERCIAL

## 2022-01-10 PROCEDURE — 97803 MED NUTRITION INDIV SUBSEQ: CPT

## 2022-01-14 NOTE — PROGRESS NOTES
NUTRITION  FOLLOW-UP TREATMENT NOTE  Patient Name: Jose Dates         Date: 01/10/2022  : 1965    YES/NO Patient  Verified  Diagnosis: BMI >45      Total Treatment Time (min):   30     SUBJECTIVE/ASSESSMENT:  Patient is doing well. She and her  are making many positive health changes. Changes in medication or medical history? Any new allergies, surgeries or procedures? YES/NO    If yes, update Summary List   None reported       Current Wt: 239# Previous Wt: 244# Wt Change: 5#     Achievement of Goals: 1. Overall patient has lost 5#. Patient Education:  [x]  Review current plan with patient   []  Other:    Handouts/  Information Provided: []  Carbohydrates  []  Protein  []  Fiber  []  Serving Sizes  []  Fluids  []  General guidelines []  Diabetes  []  Cholesterol  []  Sodium  []  SBGM  []  Food Journals  []  Others:      New Patient Goals: 1. >64 ounces of water daily.   2.  Walk after each meal.     PLAN    [x]  Continue on current plan []  Follow-up PRN   []  Discharge due to :    [x]  Next appt:   at  2:00pm     Dietitian: Faisal Sagastume RD    Date: 01/10/2022

## 2022-02-21 ENCOUNTER — HOSPITAL ENCOUNTER (OUTPATIENT)
Dept: NUTRITION | Age: 57
Discharge: HOME | End: 2022-02-21
Payer: COMMERCIAL

## 2022-02-21 PROCEDURE — 97803 MED NUTRITION INDIV SUBSEQ: CPT

## 2022-02-21 NOTE — PROGRESS NOTES
NUTRITION  FOLLOW-UP TREATMENT NOTE  Patient Name: Lesa Hudson         Date: 2022  : 1965    YES/NO Patient  Verified  Diagnosis: BMI > 45      Total Treatment Time (min):   30     SUBJECTIVE/ASSESSMENT:  Patient had many celebrations over the last month. Anniversary and  Day and a few others. She shares that she is struggling with sugar cravings and wants some input and help. Changes in medication or medical history? Any new allergies, surgeries or procedures? YES/NO    If yes, update Summary List   None reported        Current Wt: 241# Previous Wt: 244# Wt Change: 3#     Achievement of Goals: 1. Wanting to tackle the sugar cravings that are difficult to ignore. Patient Education:  [x]  Review current plan with patient   []  Other:    Handouts/  Information Provided: []  Carbohydrates  []  Protein  []  Fiber  []  Serving Sizes  []  Fluids  []  General guidelines []  Diabetes  []  Cholesterol  []  Sodium  []  SBGM  []  Food Journals  []  Others:      New Patient Goals: 1. Baritastic yusuf.  2.  <300 mg sodium per meal; <1500 mg sodium per day. 3.  Follow meal plan provided. 4.  Choose low sugar snacks.      PLAN    [x]  Continue on current plan []  Follow-up PRN   []  Discharge due to :    [x]  Next appt: 2022  at  4:30 pm     Dietitian: Nathalie Patel RD    Date: 2022

## 2022-03-05 ENCOUNTER — HOSPITAL ENCOUNTER (OUTPATIENT)
Dept: MAMMOGRAPHY | Age: 57
Discharge: HOME | End: 2022-03-05
Attending: STUDENT IN AN ORGANIZED HEALTH CARE EDUCATION/TRAINING PROGRAM
Payer: COMMERCIAL

## 2022-03-05 DIAGNOSIS — Z12.31 ENCOUNTER FOR SCREENING MAMMOGRAM FOR MALIGNANT NEOPLASM OF BREAST: ICD-10-CM

## 2022-03-05 PROCEDURE — 77063 BREAST TOMOSYNTHESIS BI: CPT

## 2022-03-18 PROBLEM — E11.9 CONTROLLED TYPE 2 DIABETES MELLITUS WITHOUT COMPLICATION, WITHOUT LONG-TERM CURRENT USE OF INSULIN (HCC): Status: ACTIVE | Noted: 2019-05-21

## 2022-03-18 PROBLEM — E78.2 MIXED HYPERLIPIDEMIA: Status: ACTIVE | Noted: 2017-12-12

## 2022-03-20 PROBLEM — M51.36 DDD (DEGENERATIVE DISC DISEASE), LUMBAR: Status: ACTIVE | Noted: 2019-02-20

## 2022-03-21 ENCOUNTER — APPOINTMENT (OUTPATIENT)
Dept: NUTRITION | Age: 57
End: 2022-03-21
Payer: COMMERCIAL

## 2022-03-28 ENCOUNTER — HOSPITAL ENCOUNTER (OUTPATIENT)
Dept: NUTRITION | Age: 57
Discharge: HOME | End: 2022-03-28
Payer: COMMERCIAL

## 2022-03-28 PROCEDURE — 97803 MED NUTRITION INDIV SUBSEQ: CPT

## 2022-04-02 NOTE — PROGRESS NOTES
NUTRITION  FOLLOW-UP TREATMENT NOTE  Patient Name: Priscila Crane         Date: 2022  : 1965    YES/NO Patient  Verified  Diagnosis: Morbid Obesity      Total Treatment Time (min):   30     SUBJECTIVE/ASSESSMENT:  Patient shares that she is celebrating this month because it is her birthday month. She has not restricted her eating as much as she believes she should. Changes in medication or medical history? Any new allergies, surgeries or procedures? YES/NO    If yes, update Summary List   None reported       Current Wt: 245# Previous Wt: 244# Wt Change: 1#     Achievement of Goals: 1. No new goal achievements reported for this visit. Patient Education:  [x]  Review current plan with patient   []  Other:    Handouts/  Information Provided: []  Carbohydrates  []  Protein  []  Fiber  []  Serving Sizes  []  Fluids  []  General guidelines []  Diabetes  []  Cholesterol  []  Sodium  []  SBGM  []  Food Journals  []  Others:      New Patient Goals: 1. Keep reading labels.      PLAN    [x]  Continue on current plan []  Follow-up PRN   []  Discharge due to :    [x]  Next appt: 2022  at 4:00 pm     Dietitian: Jeanne Christian RD    Date: 2022
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
## 2022-10-11 PROBLEM — E78.2 MIXED HYPERLIPIDEMIA: Status: RESOLVED | Noted: 2017-12-12 | Resolved: 2022-10-11

## 2022-10-11 PROBLEM — E11.9 TYPE 2 DIABETES MELLITUS, WITHOUT LONG-TERM CURRENT USE OF INSULIN (HCC): Status: ACTIVE | Noted: 2019-05-21

## 2022-10-11 PROBLEM — M25.521 RIGHT ELBOW PAIN: Status: ACTIVE | Noted: 2022-10-11

## 2022-10-21 ENCOUNTER — OFFICE VISIT (OUTPATIENT)
Dept: FAMILY MEDICINE CLINIC | Age: 57
End: 2022-10-21
Payer: COMMERCIAL

## 2022-10-21 VITALS
OXYGEN SATURATION: 99 % | BODY MASS INDEX: 45 KG/M2 | RESPIRATION RATE: 16 BRPM | DIASTOLIC BLOOD PRESSURE: 75 MMHG | HEART RATE: 70 BPM | HEIGHT: 60 IN | WEIGHT: 229.2 LBS | SYSTOLIC BLOOD PRESSURE: 136 MMHG | TEMPERATURE: 97.6 F

## 2022-10-21 DIAGNOSIS — Z12.11 SCREENING FOR COLON CANCER: ICD-10-CM

## 2022-10-21 DIAGNOSIS — M77.11 LATERAL EPICONDYLITIS OF RIGHT ELBOW: ICD-10-CM

## 2022-10-21 DIAGNOSIS — E78.5 HYPERLIPIDEMIA LDL GOAL <70: ICD-10-CM

## 2022-10-21 DIAGNOSIS — E11.9 CONTROLLED TYPE 2 DIABETES MELLITUS WITHOUT COMPLICATION, WITHOUT LONG-TERM CURRENT USE OF INSULIN (HCC): ICD-10-CM

## 2022-10-21 DIAGNOSIS — I10 ESSENTIAL HYPERTENSION: ICD-10-CM

## 2022-10-21 DIAGNOSIS — Z11.59 NEED FOR HEPATITIS C SCREENING TEST: ICD-10-CM

## 2022-10-21 DIAGNOSIS — E11.9 TYPE 2 DIABETES MELLITUS WITHOUT COMPLICATION, WITHOUT LONG-TERM CURRENT USE OF INSULIN (HCC): Primary | ICD-10-CM

## 2022-10-21 DIAGNOSIS — J45.30 MILD PERSISTENT ASTHMA WITHOUT COMPLICATION: ICD-10-CM

## 2022-10-21 PROCEDURE — 99204 OFFICE O/P NEW MOD 45 MIN: CPT | Performed by: STUDENT IN AN ORGANIZED HEALTH CARE EDUCATION/TRAINING PROGRAM

## 2022-10-21 RX ORDER — METFORMIN HYDROCHLORIDE 500 MG/1
1000 TABLET, EXTENDED RELEASE ORAL
Qty: 60 TABLET | Refills: 0 | Status: SHIPPED | OUTPATIENT
Start: 2022-10-21

## 2022-10-21 RX ORDER — BUDESONIDE AND FORMOTEROL FUMARATE DIHYDRATE 80; 4.5 UG/1; UG/1
2 AEROSOL RESPIRATORY (INHALATION) 2 TIMES DAILY
Qty: 1 EACH | Refills: 5 | Status: SHIPPED | OUTPATIENT
Start: 2022-10-21

## 2022-10-21 RX ORDER — LIDOCAINE 50 MG/G
1 PATCH TOPICAL EVERY 24 HOURS
Qty: 30 EACH | Refills: 0 | Status: SHIPPED | OUTPATIENT
Start: 2022-10-21 | End: 2022-11-05

## 2022-10-21 RX ORDER — LISINOPRIL AND HYDROCHLOROTHIAZIDE 10; 12.5 MG/1; MG/1
1 TABLET ORAL DAILY
Qty: 30 TABLET | Refills: 5 | Status: SHIPPED | OUTPATIENT
Start: 2022-10-21

## 2022-10-21 NOTE — PROGRESS NOTES
HISTORY OF PRESENT ILLNESS  Meg Norman is a 62 y.o. female. Pleasant 54-year-old female who is presenting today to establish care and discuss her chronic medical issues. Patient has a past medical history of HTN, T2DM and asthma. Patient also complains of right elbow pain of about 4 to 5 weeks duration. 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 Patient has been denies any chest pain, SOB, cough, subjective fever, N/V/D. Elbow Pain   The history is provided by the Patient. This is a new problem. Episode onset: About a month ago. Progression since onset: Wax and wanes. The pain is present in the right elbow. Pain scale: 4-8/10. The pain is moderate. Pertinent negatives include no numbness, full range of motion, no stiffness, no tingling, no back pain and no neck pain. The symptoms are aggravated by activity and movement. She has tried OTC pain medications and heat for the symptoms. The treatment provided mild relief. There has been no history of extremity trauma. Hypertension   The history is provided by the Patient and medical records. This is a chronic problem. Episode onset: Greater than 2 years ago. Progression since onset: stable. Pertinent negatives include no chest pain, no orthopnea, no palpitations, no PND, no headaches, no neck pain, no peripheral edema and no shortness of breath. Risk factors include obesity, a sedentary lifestyle, hypertension and postmenopause. Asthma  The history is provided by the Patient and medical records. This is a chronic problem. The problem has been gradually improving. Pertinent negatives include no chest pain, no headaches and no shortness of breath. The symptoms are relieved by medications. Treatments tried: Symbicort. The treatment provided significant relief. Diabetes  The history is provided by the Patient and medical records. This is a chronic problem. Episode onset: Greater than 3 years ago. The problem occurs daily. The problem has not changed since onset. Pertinent negatives include no chest pain, no headaches and no shortness of breath. The symptoms are aggravated by eating. The symptoms are relieved by medications. Treatments tried: Metformin. The treatment provided moderate relief. Review of Systems   Constitutional: Negative. HENT: Negative. Eyes: Negative. Respiratory:  Negative for shortness of breath. Cardiovascular: Negative. Negative for chest pain, palpitations, orthopnea and PND. Gastrointestinal: Negative. Musculoskeletal:  Positive for myalgias. Negative for back pain, neck pain and stiffness. Skin: Negative. Neurological: Negative. Negative for tingling, numbness and headaches. Visit Vitals  /75 (BP 1 Location: Left upper arm, BP Patient Position: Sitting, BP Cuff Size: Large adult)   Pulse 70   Temp 97.6 °F (36.4 °C) (Temporal)   Resp 16   Ht 5' (1.524 m)   Wt 229 lb 3.2 oz (104 kg)   LMP 05/11/2019   SpO2 99%   BMI 44.76 kg/m²      Physical Exam  Vitals reviewed. Constitutional:       Appearance: Normal appearance. She is obese. Comments: Morbidly obese   HENT:      Head: Normocephalic and atraumatic. Right Ear: External ear normal.      Left Ear: External ear normal.   Cardiovascular:      Rate and Rhythm: Normal rate and regular rhythm. Pulses: Normal pulses. Heart sounds: Normal heart sounds. No murmur heard. No friction rub. No gallop. Pulmonary:      Effort: Pulmonary effort is normal.      Breath sounds: Normal breath sounds. No wheezing, rhonchi or rales. Musculoskeletal:         General: Tenderness present. Comments: Positive for pain on resisted pronation of the right forearm, positive pain on palpation over the lateral epicondyles   Skin:     General: Skin is warm. Neurological:      Mental Status: She is alert and oriented to person, place, and time. ASSESSMENT and PLAN  Diagnoses and all orders for this visit:    1. Type 2 diabetes mellitus without complication, without long-term current use of insulin ([facility] [address]  Pleasant 63-year-old female with a past medical history of T2DM presenting today. Patient is currently on metformin but has not been compliant for several months. Her most recent A1c of 9/16/2021 was notable at 9.1. Will send patient for an updated A1c and make adjustments to her current regimen as needed  Encouraged to be consistent with her medications and lifestyle modifications through dietary changes and exercise. Patient will return in 6 weeks with log of her readings. Patient has been advised to call back immediately concerns   -     MICROALBUMIN, UR, RAND W/ MICROALB/CREAT RATIO; Future    2. Essential hypertension  Patient is currently on Prinzide 10-12.5 tab daily. Patient blood pressure is currently 136/75  Patient would like to continue on her current medication and denies any adverse effects to same. Counseled patient on the need for some modifications to diet and exercise. DASH diet handout printed for patient. Patient at this time denies any acute concerns and appears in stable health. -     lisinopril-hydroCHLOROthiazide (PRINZIDE, ZESTORETIC) 10-12.5 mg per tablet; Take 1 Tablet by mouth daily. 3. Lateral epicondylitis of right elbow  Patient presented right elbow pain likely 2/2 lateral epicondylitis. Counseled patient on activity modification and use of elbow brace while engaging repetitive activity. Counseled on use of warm compress/IcyHot for symptomatic relief. Counseled patient use of Lidoderm patch 12 hours on 12 hours of as needed for symptomatic relief of pain. Counseled patient on stretching exercises, printout given to patient. Will follow patient in 6 weeks. Advised patient to call back if problem deteriorates. Patient confirms understanding and accepts plan.     -     lidocaine (LIDODERM) 5 %; 1 Patch by TransDERmal route every twenty-four (24) hours for 15 days. Apply patch to the affected area for 12 hours a day and remove for 12 hours a day. 4. Mild persistent asthma without complication  Currently stable medication refilled. -     budesonide-formoteroL (SYMBICORT) 80-4.5 mcg/actuation HFAA; Take 2 Puffs by inhalation two (2) times a day. 5. Controlled type 2 diabetes mellitus without complication, without long-term current use of insulin ([facility] [address]  See above for plan. -     HEMOGLOBIN A1C WITH EAG; Future  -     metFORMIN ER (GLUCOPHAGE XR) 500 mg tablet; Take 2 Tablets by mouth daily (with dinner). 6. Hyperlipidemia LDL goal <70  Will follow-up with patient has post results. -     LIPID PANEL; Future    7. Need for hepatitis C screening test  -     HEPATITIS C AB; Future    8. Screening for colon cancer  -      COLONOSCOPY  -     REFERRAL TO GASTROENTEROLOGY     Follow-up and Dispositions    Return in about 6 weeks (around 12/2/2022) for DM, Foot exam, HLD, Asthma.

## 2022-10-21 NOTE — PROGRESS NOTES
1. \"Have you been to the ER, urgent care clinic since your last visit? Hospitalized since your last visit? \" No    2. \"Have you seen or consulted any other health care providers outside of the [address] [place] [address] since your last visit? \" No     3. For patients aged 39-70: Has the patient had a colonoscopy / FIT/ Cologuard? Yes - no Care Gap present      If the patient is female:    4. For patients aged 41-77: Has the patient had a mammogram within the past 2 years? Yes - Care Gap present. Most recent result on file      5. For patients aged 21-65: Has the patient had a pap smear?  No

## 2022-10-22 LAB
CHOLEST SERPL-MCNC: 248 MG/DL (ref 110–200)
CREATININE, URINE: 128 MG/DL
HCV AB SER IA-ACNC: NORMAL
HDLC SERPL-MCNC: 40 MG/DL
HDLC SERPL-MCNC: 6.2 MG/DL (ref 0–5)
LDL/HDL RATIO,LDHD: 4.7
LDLC SERPL CALC-MCNC: 187 MG/DL (ref 50–99)
MICROALB/CREAT RATIO, 140286: 16.5 (ref 0–30)
MICROALBUMIN,URINE RANDOM 140054: 21.1 MG/L (ref 0.1–17)
NON-HDL CHOLESTEROL, 011976: 208 MG/DL
TRIGL SERPL-MCNC: 106 MG/DL (ref 40–149)
VLDLC SERPL CALC-MCNC: 21 MG/DL (ref 8–30)

## 2022-11-02 ENCOUNTER — TELEPHONE (OUTPATIENT)
Dept: FAMILY MEDICINE CLINIC | Age: 57
End: 2022-11-02

## 2022-11-02 DIAGNOSIS — E78.5 HYPERLIPIDEMIA LDL GOAL <70: Primary | ICD-10-CM

## 2022-11-02 RX ORDER — ATORVASTATIN CALCIUM 40 MG/1
40 TABLET, FILM COATED ORAL DAILY
Qty: 30 TABLET | Refills: 2 | Status: SHIPPED | OUTPATIENT
Start: 2022-11-02 | End: 2023-01-31

## 2022-11-02 NOTE — TELEPHONE ENCOUNTER
Called patient to discuss her recent lab results. Advised patient that her hemoglobin A1c was elevated at 8.3. Patient, admits that she has been off her metformin for a while. Advised patient to be consistent with lifestyle modifications, and her medications. Also advised patient, that her lipid panel results are markedly elevated. Advised patient that I will be ordering Lipitor 40 mg daily, which will be continued in addition to her lifestyle modifications. We will reassess A1c in 3 months. Patient agrees with plan and confirms understanding.

## 2022-11-02 NOTE — TELEPHONE ENCOUNTER
Called patient this morning to go over her lab results. I was unable to reach patient.       I spoke with her  who advised to call back during the lunch hour or after 3:30 PM. I will try to reach patient later today

## 2022-11-14 ENCOUNTER — APPOINTMENT (OUTPATIENT)
Dept: GENERAL RADIOLOGY | Age: 57
End: 2022-11-14
Attending: EMERGENCY MEDICINE
Payer: COMMERCIAL

## 2022-11-14 VITALS
HEART RATE: 65 BPM | TEMPERATURE: 98.6 F | BODY MASS INDEX: 43.78 KG/M2 | HEIGHT: 60 IN | SYSTOLIC BLOOD PRESSURE: 134 MMHG | WEIGHT: 223 LBS | DIASTOLIC BLOOD PRESSURE: 52 MMHG | RESPIRATION RATE: 20 BRPM | OXYGEN SATURATION: 100 %

## 2022-11-14 PROCEDURE — 73140 X-RAY EXAM OF FINGER(S): CPT

## 2022-11-14 PROCEDURE — 99283 EMERGENCY DEPT VISIT LOW MDM: CPT

## 2022-11-14 PROCEDURE — 71046 X-RAY EXAM CHEST 2 VIEWS: CPT

## 2022-11-15 ENCOUNTER — HOSPITAL ENCOUNTER (EMERGENCY)
Age: 57
Discharge: HOME | End: 2022-11-15
Attending: EMERGENCY MEDICINE
Payer: COMMERCIAL

## 2022-11-15 DIAGNOSIS — J06.9 ACUTE UPPER RESPIRATORY INFECTION: ICD-10-CM

## 2022-11-15 DIAGNOSIS — S60.042A CONTUSION OF LEFT RING FINGER WITHOUT DAMAGE TO NAIL, INITIAL ENCOUNTER: Primary | ICD-10-CM

## 2022-11-15 PROCEDURE — 74011250637 HC RX REV CODE- 250/637: Performed by: EMERGENCY MEDICINE

## 2022-11-15 RX ORDER — ACETAMINOPHEN 325 MG/1
650 TABLET ORAL
Status: COMPLETED | OUTPATIENT
Start: 2022-11-15 | End: 2022-11-15

## 2022-11-15 RX ORDER — GUAIFENESIN 600 MG/1
600 TABLET, EXTENDED RELEASE ORAL 2 TIMES DAILY
Qty: 14 TABLET | Refills: 0 | Status: SHIPPED | OUTPATIENT
Start: 2022-11-15 | End: 2022-11-22

## 2022-11-15 RX ORDER — BENZONATATE 100 MG/1
100 CAPSULE ORAL
Qty: 20 CAPSULE | Refills: 0 | Status: SHIPPED | OUTPATIENT
Start: 2022-11-15 | End: 2022-11-22

## 2022-11-15 RX ADMIN — ACETAMINOPHEN 650 MG: 325 TABLET, FILM COATED ORAL at 01:29

## 2022-11-15 NOTE — ED TRIAGE NOTES
Pt reports slip and fall on porch yesterday due to shoes being wet. C/o left 4th finger pain and swelling. Denies any head injury or LOC. C/o cough productive of clear sputum x 1.5 weeks. Denies any fevers. C/o left ear pain intermittently \"for a while\".

## 2022-11-15 NOTE — ED PROVIDER NOTES
Pt c/o cough/congestion x 1-2 weeks w intermittent left ear pain, mild. No fever, no sob. No weakness. No n/v/d. Nl po intake. No meds for sx's pta  Also c/o left 4th finger pain, says fell on it yest. Can move normally. Mild pain to touch. No swelling, no rash or wound. Past Medical History:   Diagnosis Date    Allergic rhinitis 11/9/2009    Asthma 2015    PFT confirmed    CAD (coronary artery disease)     hx mi-2013 ? Carpal tunnel syndrome 11/9/2009    Chronic low back pain without sciatica 5/17/2016    Fibroid, uterine 11/9/2009    GERD (gastroesophageal reflux disease) 11/9/2009    Heart attack ([address]     MI approx 2013    HLD (hyperlipidemia) 11/9/2009    Hypertension     Hypertriglyceridemia 5/17/2016    Hypothyroidism 5/17/2016       Past Surgical History:   Procedure Laterality Date    COLONOSCOPY N/A 6/1/2017    COLONOSCOPY with Polypectomy performed by Lorenzo Hernandez MD at [facility]    HX BREAST BIOPSY      benign, left    HX GYN      fibroids removed    HX MYOMECTOMY      open first, then embolization 2nd time.     HX OTHER SURGICAL      cyst removed    HX TONSILLECTOMY      MA COLONOSCOPY W/BIOPSY SINGLE/MULTIPLE N/A 06/01/2017    Dr. Yamilka Blanco         Family History:   Problem Relation Age of Onset    Hypertension Mother     Stroke Father         mini strokes, not sure of number    Hypertension Father     Seizures Father     Cancer Maternal Grandfather         not sure type       Social History     Socioeconomic History    Marital status:      Spouse name: Not on file    Number of children: Not on file    Years of education: Not on file    Highest education level: Not on file   Occupational History    Not on file   Tobacco Use    Smoking status: Never    Smokeless tobacco: Never   Vaping Use    Vaping Use: Never used   Substance and Sexual Activity    Alcohol use: No     Alcohol/week: 0.0 standard drinks    Drug use: No    Sexual activity: Yes     Partners: Male     Birth control/protection: None   Other Topics Concern     Service No    Blood Transfusions No    Caffeine Concern No    Occupational Exposure No    Hobby Hazards No    Sleep Concern No    Stress Concern No    Weight Concern Yes    Special Diet No    Back Care Yes    Exercise No    Bike Helmet No    Seat Belt Yes     Comment: some times    Self-Exams Yes   Social History Narrative    Not on file     Social Determinants of Health     Financial Resource Strain: Low Risk     Difficulty of Paying Living Expenses: Not hard at all   Food Insecurity: No Food Insecurity    Worried About Running Out of Food in the Last Year: Never true    Ran Out of Food in the Last Year: Never true   Transportation Needs: Not on file   Physical Activity: Not on file   Stress: Not on file   Social Connections: Not on file   Intimate Partner Violence: Not on file   Housing Stability: Not on file         ALLERGIES: Motrin [ibuprofen]    Review of Systems   Constitutional:  Negative for fever. HENT:  Positive for congestion. Respiratory:  Positive for cough. Negative for shortness of breath. Cardiovascular:  Negative for chest pain. Gastrointestinal:  Negative for abdominal pain and vomiting. Musculoskeletal:  Negative for back pain. Skin:  Negative for rash. Neurological:  Negative for light-headedness. All other systems reviewed and are negative. Vitals:    11/14/22 2310   BP: (!) 134/52   Pulse: 65   Resp: 20   Temp: 98.6 °F (37 °C)   SpO2: 100%   Weight: 101.2 kg (223 lb)   Height: 5' (1.524 m)            Physical Exam  Vitals and nursing note reviewed. Constitutional:       Appearance: She is well-developed. She is not diaphoretic. HENT:      Head: Normocephalic and atraumatic. Eyes:      Pupils: Pupils are equal, round, and reactive to light. Cardiovascular:      Rate and Rhythm: Normal rate and regular rhythm. Heart sounds: No murmur heard.   Pulmonary:      Effort: Pulmonary effort is normal. Breath sounds: No wheezing. Abdominal:      Palpations: Abdomen is soft. Tenderness: There is no abdominal tenderness. Musculoskeletal:         General: Tenderness (+ ttp prox l fourth finger, from,  nvi.  no pain w compression.) present. Cervical back: Normal range of motion. Skin:     General: Skin is dry. Capillary Refill: Capillary refill takes less than 2 seconds. Findings: No rash. Neurological:      Mental Status: She is alert and oriented to person, place, and time. Psychiatric:         Mood and Affect: Mood normal.        MDM         Procedures        Vitals:  No data found. Medications ordered:   Medications   acetaminophen (TYLENOL) tablet 650 mg (650 mg Oral Given 11/15/22 0129)         Lab findings:  No results found for this or any previous visit (from the past 12 hour(s)). X-Ray, CT or other radiology findings or impressions:  XR CHEST PA LAT   Final Result   No acute findings      XR 4TH FINGER LT MIN 2 V   Final Result      No definite acute bone findings. If symptoms persist consider MRI. Progress notes, Consult notes or additional Procedure notes:   No fx, nvi. No emc. Lungs ctab. No hypoxia, af, nl vitals, no ind for further testing. Not c/w pna/sepsis/bacteremia      Diagnosis:   1. Contusion of left ring finger without damage to nail, initial encounter    2.  Acute upper respiratory infection        Disposition: home    Follow-up Information       Follow up With Specialties Details Why Contact Info    [facility] EMERGENCY DEPT Emergency Medicine Go to  As needed, If symptoms worsen [address], [address] [facility] Family Medicine   [address]  [address] [place]  [address]  [phone]               Discharge Medication List as of 11/15/2022  1:32 AM        START taking these medications    Details   benzonatate (Tessalon Perles) 100 mg capsule Take 1 Capsule by mouth three (3) times daily as needed for Cough for up to 7 days. , Normal, Disp-20 Capsule, R-0      guaiFENesin ER (MUCINEX) 600 mg ER tablet Take 1 Tablet by mouth two (2) times a day for 7 days. , Normal, Disp-14 Tablet, R-0           CONTINUE these medications which have NOT CHANGED    Details   atorvastatin (LIPITOR) 40 mg tablet Take 1 Tablet by mouth daily for 90 days. , Normal, Disp-30 Tablet, R-2      budesonide-formoteroL (SYMBICORT) 80-4.5 mcg/actuation HFAA Take 2 Puffs by inhalation two (2) times a day., Normal, Disp-1 Each, R-5      lisinopril-hydroCHLOROthiazide (PRINZIDE, ZESTORETIC) 10-12.5 mg per tablet Take 1 Tablet by mouth daily. , Normal, Disp-30 Tablet, R-5      metFORMIN ER (GLUCOPHAGE XR) 500 mg tablet Take 2 Tablets by mouth daily (with dinner). , Normal, Disp-60 Tablet, R-0      ergocalciferol (DRISDOL) 50,000 unit capsule Take 1 Cap by mouth every seven (7) days. , Normal, Disp-12 Cap, R-0      aspirin 81 mg chewable tablet Take 1 Tab by mouth daily. , No Print, Disp-30 Tab, R-0      Blood-Glucose Meter monitoring kit As directed, Normal, Disp-1 Kit, R-0      lancets misc As directed, Normal, Disp-1 Each, R-11      glucose blood VI test strips (ASCENSIA AUTODISC VI, ONE TOUCH ULTRA TEST VI) strip Monitor your glucose every morning before breakfast, Normal, Disp-50 Strip, R-5      clindamycin (CLINDAGEL) 1 % topical gel Apply  to affected area two (2) times a day. use thin film on affected area, Normal, Disp-1 mL, R-0      ketoconazole (NIZORAL) 2 % topical cream Historical Med, R-3      mometasone (NASONEX) 50 mcg/actuation nasal spray 2 Sprays by Both Nostrils route daily. , Normal, Disp-1 Container, R-0      Omega-3 Fatty Acids 300 mg cap Take  by mouth., Historical Med

## 2022-11-15 NOTE — Clinical Note
[address] [facility] EMERGENCY DEPT  [id number] [id number] [phone]    Work/School Note    Date: 11/14/2022    To Whom It May concern:    Michelle Pierson was seen and treated today in the emergency room by the following provider(s):  Attending Provider: Malia Chun MD.      Michelle Pierson is excused from work/school on 11/15/22 and 11/16/22. She is medically clear to return to work/school on 11/17/2022.        Sincerely,          Floridalma Tam RN

## 2022-11-15 NOTE — Clinical Note
[address] [facility] EMERGENCY DEPT  [address] [id number] [phone]    Work/School Note    Date: 11/14/2022    To Whom It May concern:    Magalys Zhou was seen and treated today in the emergency room by the following provider(s):  Attending Provider: Aster Sánchez MD.      Magalys Zhou is excused from work/school on 11/15/22 and 11/16/22. She is medically clear to return to work/school on 11/17/2022.        Sincerely,          Nunu Perdomo MD

## 2024-02-15 ENCOUNTER — TELEPHONE (OUTPATIENT)
Age: 59
End: 2024-02-15

## (undated) DEVICE — FLUFF AND POLYMER UNDERPAD,EXTRA HEAVY: Brand: WINGS

## (undated) DEVICE — (D)SYR 10ML 1/5ML GRAD NSAF -- PKGING CHANGE USE ITEM 338027

## (undated) DEVICE — SYR 50ML SLIP TIP NSAF LF STRL --

## (undated) DEVICE — MEDI-VAC SUCTION HIGH CAPACITY: Brand: CARDINAL HEALTH

## (undated) DEVICE — CATHETER SUCT TR FL TIP 14FR W/ O CTRL

## (undated) DEVICE — FLEX ADVANTAGE 3000CC: Brand: FLEX ADVANTAGE

## (undated) DEVICE — SYRINGE MED 20ML STD CLR PLAS LUERLOCK TIP N CTRL DISP

## (undated) DEVICE — (D)GLOVE EXAM LG NITRL NS -- DISC BY MFR NO SUB

## (undated) DEVICE — FORCEPS BX L240CM JAW DIA2.8MM L CAP W/ NDL MIC MESH TOOTH

## (undated) DEVICE — GAUZE SPONGES,16 PLY: Brand: CURITY

## (undated) DEVICE — AIRLIFE™ NASAL OXYGEN CANNULA CURVED, NONFLARED TIP WITH 14 FOOT (4.3 M) CRUSH-RESISTANT TUBING, OVER-THE-EAR STYLE: Brand: AIRLIFE™

## (undated) DEVICE — GOWN ISOL IMPERV UNIV, DISP, OPEN BACK, BLUE --

## (undated) DEVICE — MEDI-VAC NON-CONDUCTIVE SUCTION TUBING: Brand: CARDINAL HEALTH

## (undated) DEVICE — CATHETER THOR 36FR DIA10.7MM POLYVI CHL TRCR TIP STR SFT

## (undated) DEVICE — SOLUTION IRRIG 1000ML H2O STRL BLT

## (undated) DEVICE — CANNULA ORIG TL CLR W FOAM CUSHIONS AND 14FT SUPL TB 3 CHN

## (undated) DEVICE — SYRINGE MED 25GA 3ML L5/8IN SUBQ PLAS W/ DETACH NDL SFTY

## (undated) DEVICE — ENDOSCOPY PUMP TUBING/ CAP SET: Brand: ERBE